# Patient Record
Sex: FEMALE | Race: WHITE | NOT HISPANIC OR LATINO
[De-identification: names, ages, dates, MRNs, and addresses within clinical notes are randomized per-mention and may not be internally consistent; named-entity substitution may affect disease eponyms.]

---

## 2017-12-12 ENCOUNTER — APPOINTMENT (OUTPATIENT)
Dept: OBGYN | Facility: CLINIC | Age: 42
End: 2017-12-12
Payer: MEDICAID

## 2017-12-12 VITALS
WEIGHT: 220 LBS | SYSTOLIC BLOOD PRESSURE: 120 MMHG | HEIGHT: 68 IN | DIASTOLIC BLOOD PRESSURE: 80 MMHG | BODY MASS INDEX: 33.34 KG/M2

## 2017-12-12 DIAGNOSIS — Z01.419 ENCOUNTER FOR GYNECOLOGICAL EXAMINATION (GENERAL) (ROUTINE) W/OUT ABNORMAL FINDINGS: ICD-10-CM

## 2017-12-12 LAB
HCG UR QL: NEGATIVE
QUALITY CONTROL: YES

## 2017-12-12 PROCEDURE — 99396 PREV VISIT EST AGE 40-64: CPT

## 2017-12-12 PROCEDURE — 81025 URINE PREGNANCY TEST: CPT

## 2017-12-12 PROCEDURE — 99213 OFFICE O/P EST LOW 20 MIN: CPT | Mod: 25

## 2017-12-13 LAB — HPV HIGH+LOW RISK DNA PNL CVX: NOT DETECTED

## 2017-12-18 LAB — CYTOLOGY CVX/VAG DOC THIN PREP: NORMAL

## 2017-12-21 ENCOUNTER — APPOINTMENT (OUTPATIENT)
Dept: OBGYN | Facility: CLINIC | Age: 42
End: 2017-12-21
Payer: MEDICAID

## 2017-12-21 ENCOUNTER — ASOB RESULT (OUTPATIENT)
Age: 42
End: 2017-12-21

## 2017-12-21 PROCEDURE — 76830 TRANSVAGINAL US NON-OB: CPT

## 2017-12-21 PROCEDURE — 76857 US EXAM PELVIC LIMITED: CPT

## 2018-10-01 ENCOUNTER — APPOINTMENT (OUTPATIENT)
Dept: OBGYN | Facility: CLINIC | Age: 43
End: 2018-10-01
Payer: MEDICAID

## 2018-10-01 VITALS
WEIGHT: 251.25 LBS | HEART RATE: 103 BPM | HEIGHT: 68 IN | SYSTOLIC BLOOD PRESSURE: 151 MMHG | DIASTOLIC BLOOD PRESSURE: 101 MMHG | BODY MASS INDEX: 38.08 KG/M2

## 2018-10-01 PROCEDURE — 99214 OFFICE O/P EST MOD 30 MIN: CPT

## 2018-10-02 LAB
C TRACH RRNA SPEC QL NAA+PROBE: NOT DETECTED
HPV HIGH+LOW RISK DNA PNL CVX: NOT DETECTED
N GONORRHOEA RRNA SPEC QL NAA+PROBE: NOT DETECTED
SOURCE TP AMPLIFICATION: NORMAL

## 2018-10-05 LAB — CYTOLOGY CVX/VAG DOC THIN PREP: NORMAL

## 2018-11-12 ENCOUNTER — ASOB RESULT (OUTPATIENT)
Age: 43
End: 2018-11-12

## 2018-11-12 ENCOUNTER — APPOINTMENT (OUTPATIENT)
Dept: OBGYN | Facility: CLINIC | Age: 43
End: 2018-11-12
Payer: COMMERCIAL

## 2018-11-12 PROCEDURE — 76830 TRANSVAGINAL US NON-OB: CPT

## 2018-11-12 PROCEDURE — 76857 US EXAM PELVIC LIMITED: CPT

## 2018-11-15 ENCOUNTER — APPOINTMENT (OUTPATIENT)
Dept: OBGYN | Facility: CLINIC | Age: 43
End: 2018-11-15
Payer: COMMERCIAL

## 2018-11-15 VITALS
SYSTOLIC BLOOD PRESSURE: 130 MMHG | WEIGHT: 248 LBS | DIASTOLIC BLOOD PRESSURE: 88 MMHG | HEIGHT: 70 IN | BODY MASS INDEX: 35.5 KG/M2 | HEART RATE: 104 BPM

## 2018-11-15 DIAGNOSIS — D25.2 SUBMUCOUS LEIOMYOMA OF UTERUS: ICD-10-CM

## 2018-11-15 DIAGNOSIS — D25.0 SUBMUCOUS LEIOMYOMA OF UTERUS: ICD-10-CM

## 2018-11-15 PROCEDURE — 99213 OFFICE O/P EST LOW 20 MIN: CPT

## 2020-07-24 ENCOUNTER — APPOINTMENT (OUTPATIENT)
Dept: OBGYN | Facility: CLINIC | Age: 45
End: 2020-07-24
Payer: MEDICAID

## 2020-07-24 VITALS
SYSTOLIC BLOOD PRESSURE: 130 MMHG | BODY MASS INDEX: 37.06 KG/M2 | WEIGHT: 258.25 LBS | DIASTOLIC BLOOD PRESSURE: 89 MMHG

## 2020-07-24 PROCEDURE — 99214 OFFICE O/P EST MOD 30 MIN: CPT

## 2020-07-24 NOTE — PHYSICAL EXAM
[Awake] : awake [Acute Distress] : no acute distress [Alert] : alert [Mass] : no breast mass [Nipple Discharge] : no nipple discharge [Soft] : soft [Axillary LAD] : no axillary lymphadenopathy [Tender] : non tender [Oriented x3] : oriented to person, place, and time [Normal] : uterus [Enlarged ___ wks] : enlarged [unfilled] ~Uweeks [No Bleeding] : there was no active vaginal bleeding [IUD String] : had an IUD string protruding out [Uterine Adnexae] : were not tender and not enlarged

## 2020-07-28 LAB — CYTOLOGY CVX/VAG DOC THIN PREP: ABNORMAL

## 2021-04-15 ENCOUNTER — APPOINTMENT (OUTPATIENT)
Dept: OBGYN | Facility: CLINIC | Age: 46
End: 2021-04-15
Payer: MEDICAID

## 2021-04-15 VITALS
DIASTOLIC BLOOD PRESSURE: 93 MMHG | SYSTOLIC BLOOD PRESSURE: 155 MMHG | BODY MASS INDEX: 37.23 KG/M2 | WEIGHT: 259.44 LBS

## 2021-04-15 PROCEDURE — 99072 ADDL SUPL MATRL&STAF TM PHE: CPT

## 2021-04-15 PROCEDURE — 99213 OFFICE O/P EST LOW 20 MIN: CPT

## 2021-05-04 ENCOUNTER — ASOB RESULT (OUTPATIENT)
Age: 46
End: 2021-05-04

## 2021-05-04 ENCOUNTER — APPOINTMENT (OUTPATIENT)
Dept: ANTEPARTUM | Facility: CLINIC | Age: 46
End: 2021-05-04
Payer: MEDICAID

## 2021-05-04 PROCEDURE — 99072 ADDL SUPL MATRL&STAF TM PHE: CPT

## 2021-05-04 PROCEDURE — 76830 TRANSVAGINAL US NON-OB: CPT

## 2021-05-04 PROCEDURE — 76856 US EXAM PELVIC COMPLETE: CPT | Mod: 59

## 2021-05-12 ENCOUNTER — APPOINTMENT (OUTPATIENT)
Dept: OBGYN | Facility: CLINIC | Age: 46
End: 2021-05-12

## 2021-06-01 ENCOUNTER — APPOINTMENT (OUTPATIENT)
Dept: OBGYN | Facility: CLINIC | Age: 46
End: 2021-06-01

## 2021-06-15 ENCOUNTER — APPOINTMENT (OUTPATIENT)
Dept: OBGYN | Facility: CLINIC | Age: 46
End: 2021-06-15
Payer: MEDICAID

## 2021-06-15 VITALS
DIASTOLIC BLOOD PRESSURE: 91 MMHG | SYSTOLIC BLOOD PRESSURE: 146 MMHG | BODY MASS INDEX: 36.11 KG/M2 | HEIGHT: 70 IN | WEIGHT: 252.25 LBS

## 2021-06-15 PROCEDURE — 99213 OFFICE O/P EST LOW 20 MIN: CPT

## 2021-11-23 ENCOUNTER — APPOINTMENT (OUTPATIENT)
Dept: NEUROLOGY | Facility: CLINIC | Age: 46
End: 2021-11-23
Payer: MEDICAID

## 2021-11-23 VITALS
BODY MASS INDEX: 35.65 KG/M2 | HEIGHT: 70 IN | SYSTOLIC BLOOD PRESSURE: 120 MMHG | WEIGHT: 249 LBS | DIASTOLIC BLOOD PRESSURE: 70 MMHG

## 2021-11-23 DIAGNOSIS — Z86.39 PERSONAL HISTORY OF OTHER ENDOCRINE, NUTRITIONAL AND METABOLIC DISEASE: ICD-10-CM

## 2021-11-23 DIAGNOSIS — Z87.39 PERSONAL HISTORY OF OTHER DISEASES OF THE MUSCULOSKELETAL SYSTEM AND CONNECTIVE TISSUE: ICD-10-CM

## 2021-11-23 PROCEDURE — 99204 OFFICE O/P NEW MOD 45 MIN: CPT

## 2021-11-23 NOTE — HISTORY OF PRESENT ILLNESS
[FreeTextEntry1] : I saw this patient in the office today.\par \par The patient describes headaches.\par This has been going on since the age of 11\par It waxes and wanes in intensity.\par She had been on amitriptyline for prophylaxis in the past. \par She reports that her headaches had subsided to about once per week.\par She then discontinued the amitriptyline.\par The headaches continue to subside and are now occurring only once every few months.\par They last a few days at a time.\par When severe it is associated with nausea and photophobia.

## 2021-11-23 NOTE — ASSESSMENT
[FreeTextEntry1] : This is a 46-year-old woman with what sounds like episodic migraine.\par She has never had imaging.\par I will obtain an MRI of the brain.\par \par I have explained that there are essentially 2 strategies for dealing with chronic headaches.  The first is abortive medication of which there are numerous over-the-counter preparations as well as prescription options.  The second strategy is prophylactic medications.  I have explained that these are medications which prevent headaches when taken on a regular basis.  I have explained that there are several medications which have been found to be preventative against headaches.  I have further explained that none of the medications have an immediate effect.  They must build up in the system over a few weeks.  Most people notice that within a few weeks on the medication, the headache intensity is diminishing.  Within a few more weeks most people begin to notice that the frequency is decreasing as well.  I have explained that the preventive medications were all originally used for other conditions but were also found to work against chronic headaches.  The patient seemed to understand my explanation.\par \par At present she does not seem to require daily preventive medication.\par \par I have suggested she resume Imitrex 100 mg tablets to be taken as needed at migraine onset.\par \par I will see her back in a few months.\par

## 2021-11-23 NOTE — CONSULT LETTER
[Dear  ___] : Dear  [unfilled], [Courtesy Letter:] : I had the pleasure of seeing your patient, [unfilled], in my office today. [Please see my note below.] : Please see my note below. [Consult Closing:] : Thank you very much for allowing me to participate in the care of this patient.  If you have any questions, please do not hesitate to contact me. [Sincerely,] : Sincerely, [FreeTextEntry3] : Harris Adames MD.

## 2021-12-13 ENCOUNTER — APPOINTMENT (OUTPATIENT)
Dept: MRI IMAGING | Facility: CLINIC | Age: 46
End: 2021-12-13
Payer: MEDICAID

## 2021-12-13 ENCOUNTER — OUTPATIENT (OUTPATIENT)
Dept: OUTPATIENT SERVICES | Facility: HOSPITAL | Age: 46
LOS: 1 days | End: 2021-12-13
Payer: MEDICAID

## 2021-12-13 DIAGNOSIS — G44.89 OTHER HEADACHE SYNDROME: ICD-10-CM

## 2021-12-13 PROCEDURE — 70551 MRI BRAIN STEM W/O DYE: CPT

## 2021-12-13 PROCEDURE — 70551 MRI BRAIN STEM W/O DYE: CPT | Mod: 26

## 2021-12-14 ENCOUNTER — NON-APPOINTMENT (OUTPATIENT)
Age: 46
End: 2021-12-14

## 2021-12-15 ENCOUNTER — APPOINTMENT (OUTPATIENT)
Dept: NEUROLOGY | Facility: CLINIC | Age: 46
End: 2021-12-15
Payer: MEDICAID

## 2021-12-15 VITALS
SYSTOLIC BLOOD PRESSURE: 120 MMHG | HEIGHT: 71 IN | BODY MASS INDEX: 34.86 KG/M2 | DIASTOLIC BLOOD PRESSURE: 78 MMHG | WEIGHT: 249 LBS

## 2021-12-15 PROCEDURE — 99214 OFFICE O/P EST MOD 30 MIN: CPT

## 2021-12-15 NOTE — PHYSICAL EXAM
[General Appearance - Alert] : alert [General Appearance - In No Acute Distress] : in no acute distress [Oriented To Time, Place, And Person] : oriented to person, place, and time [Affect] : the affect was normal [Memory Recent] : recent memory was not impaired [Memory Remote] : remote memory was not impaired [Cranial Nerves Optic (II)] : visual acuity intact bilaterally,  visual fields full to confrontation, pupils equal round and reactive to light [Cranial Nerves Oculomotor (III)] : extraocular motion intact [Cranial Nerves Trigeminal (V)] : facial sensation intact symmetrically [Cranial Nerves Facial (VII)] : face symmetrical [Cranial Nerves Vestibulocochlear (VIII)] : hearing was intact bilaterally [Cranial Nerves Glossopharyngeal (IX)] : tongue and palate midline [Cranial Nerves Accessory (XI - Cranial And Spinal)] : head turning and shoulder shrug symmetric [Cranial Nerves Hypoglossal (XII)] : there was no tongue deviation with protrusion [Motor Tone] : muscle tone was normal in all four extremities [Sensation Tactile Decrease] : light touch was intact [Motor Strength] : muscle strength was normal in all four extremities [Sensation Pain / Temperature Decrease] : pain and temperature was intact [Sensation Vibration Decrease] : vibration was intact [Abnormal Walk] : normal gait [2+] : Patella left 2+ [Optic Disc Abnormality] : the optic disc were normal in size and color [Dysarthria] : no dysarthria [Aphasia] : no dysphasia/aphasia [Romberg's Sign] : Romberg's sign was negtive [Coordination - Dysmetria Impaired Finger-to-Nose Bilateral] : not present [Plantar Reflex Right Only] : normal on the right [Plantar Reflex Left Only] : normal on the left

## 2021-12-15 NOTE — HISTORY OF PRESENT ILLNESS
[FreeTextEntry1] : I saw this patient in the office today.\par She presented with her daughter today.\par \par As you recall, the patient described headaches.\par This has been going on since the age of 11\par It waxes and wanes in intensity.\par \par She had been on amitriptyline for prophylaxis in the past. \par She reported that her headaches had subsided to about once per week.\par She then discontinued the amitriptyline.\par The headaches continue to subside and are now occurring only once every few months.\par They last a few days at a time.\par When severe it is associated with nausea and photophobia.

## 2021-12-15 NOTE — ASSESSMENT
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 (versus normal anatomic variant) in the left inferior frontal region.\par \par At present she does not seem to require daily preventive medication.\par \par I had suggested she resume Imitrex 100 mg tablets to be taken as needed at migraine onset.\par \par I will see her back in 3 months.\par \par I will repeat the MRI in 6 months to assess for stability.\par

## 2021-12-15 NOTE — DATA REVIEWED
[de-identified] : Brain MRI was performed on 12/13/41.\par DD images.\par There is a partially empty sella.\par The left inferior frontal gyrus has a somewhat atypical appearance and a small encephalocele cannot fully be excluded.\par

## 2022-02-10 ENCOUNTER — APPOINTMENT (OUTPATIENT)
Dept: NEUROLOGY | Facility: CLINIC | Age: 47
End: 2022-02-10

## 2022-02-23 ENCOUNTER — RX RENEWAL (OUTPATIENT)
Age: 47
End: 2022-02-23

## 2022-03-24 ENCOUNTER — APPOINTMENT (OUTPATIENT)
Dept: NEUROLOGY | Facility: CLINIC | Age: 47
End: 2022-03-24
Payer: MEDICAID

## 2022-03-24 VITALS
BODY MASS INDEX: 34.02 KG/M2 | DIASTOLIC BLOOD PRESSURE: 80 MMHG | HEIGHT: 71 IN | SYSTOLIC BLOOD PRESSURE: 114 MMHG | WEIGHT: 243 LBS

## 2022-03-24 PROCEDURE — 99213 OFFICE O/P EST LOW 20 MIN: CPT

## 2022-03-24 NOTE — HISTORY OF PRESENT ILLNESS
Yes [FreeTextEntry1] : I saw this patient in the office today.\par She presented with her daughter today.\par \par As you recall, the patient described headaches.\par This has been going on since the age of 11\par It waxes and wanes in intensity.\par \par She had been on amitriptyline for prophylaxis in the past. \par She reported that her headaches had subsided to about once per week.\par She then discontinued the amitriptyline.\par The headaches continue to subside and are now occurring only once every few months.\par They last a few days at a time.\par When severe it is associated with nausea and photophobia.

## 2022-03-24 NOTE — PHYSICAL EXAM
[General Appearance - Alert] : alert [General Appearance - In No Acute Distress] : in no acute distress [Oriented To Time, Place, And Person] : oriented to person, place, and time [Affect] : the affect was normal [Memory Recent] : recent memory was not impaired [Memory Remote] : remote memory was not impaired [Cranial Nerves Optic (II)] : visual acuity intact bilaterally,  visual fields full to confrontation, pupils equal round and reactive to light [Cranial Nerves Oculomotor (III)] : extraocular motion intact [Cranial Nerves Trigeminal (V)] : facial sensation intact symmetrically [Cranial Nerves Facial (VII)] : face symmetrical [Cranial Nerves Vestibulocochlear (VIII)] : hearing was intact bilaterally [Cranial Nerves Glossopharyngeal (IX)] : tongue and palate midline [Cranial Nerves Accessory (XI - Cranial And Spinal)] : head turning and shoulder shrug symmetric [Cranial Nerves Hypoglossal (XII)] : there was no tongue deviation with protrusion [Motor Tone] : muscle tone was normal in all four extremities [Motor Strength] : muscle strength was normal in all four extremities [Sensation Tactile Decrease] : light touch was intact [Sensation Pain / Temperature Decrease] : pain and temperature was intact [Sensation Vibration Decrease] : vibration was intact [Abnormal Walk] : normal gait [2+] : Patella left 2+ [Optic Disc Abnormality] : the optic disc were normal in size and color [Dysarthria] : no dysarthria [Aphasia] : no dysphasia/aphasia [Romberg's Sign] : Romberg's sign was negtive [Coordination - Dysmetria Impaired Finger-to-Nose Bilateral] : not present [Plantar Reflex Right Only] : normal on the right [Plantar Reflex Left Only] : normal on the left

## 2022-03-24 NOTE — DATA REVIEWED
[de-identified] : Brain MRI was performed on 12/13/41.\par DD images.\par There is a partially empty sella.\par The left inferior frontal gyrus has a somewhat atypical appearance and a small encephalocele cannot fully be excluded.\par

## 2022-04-04 ENCOUNTER — APPOINTMENT (OUTPATIENT)
Dept: OBGYN | Facility: CLINIC | Age: 47
End: 2022-04-04
Payer: MEDICAID

## 2022-04-04 VITALS
DIASTOLIC BLOOD PRESSURE: 76 MMHG | WEIGHT: 246 LBS | BODY MASS INDEX: 34.44 KG/M2 | SYSTOLIC BLOOD PRESSURE: 122 MMHG | HEIGHT: 71 IN

## 2022-04-04 DIAGNOSIS — Z12.39 ENCOUNTER FOR OTHER SCREENING FOR MALIGNANT NEOPLASM OF BREAST: ICD-10-CM

## 2022-04-04 PROCEDURE — 99214 OFFICE O/P EST MOD 30 MIN: CPT

## 2022-04-04 NOTE — PHYSICAL EXAM
[Chaperone Present] : A chaperone was present in the examining room during all aspects of the physical examination [FreeTextEntry1] : Celia [Normal] : uterus [No Bleeding] : there was no active vaginal bleeding [IUD String] : had an IUD string protruding out [Tenderness] : tender [Enlarged ___ wks] : enlarged [unfilled] ~Uweeks [Uterine Adnexae] : were not tender and not enlarged

## 2022-04-26 ENCOUNTER — APPOINTMENT (OUTPATIENT)
Dept: OBGYN | Facility: CLINIC | Age: 47
End: 2022-04-26
Payer: MEDICAID

## 2022-04-26 ENCOUNTER — RESULT CHARGE (OUTPATIENT)
Age: 47
End: 2022-04-26

## 2022-04-26 VITALS — HEIGHT: 71 IN | DIASTOLIC BLOOD PRESSURE: 80 MMHG | SYSTOLIC BLOOD PRESSURE: 122 MMHG

## 2022-04-26 DIAGNOSIS — Z30.432 ENCOUNTER FOR REMOVAL OF INTRAUTERINE CONTRACEPTIVE DEVICE: ICD-10-CM

## 2022-04-26 LAB
HCG UR QL: NEGATIVE
QUALITY CONTROL: YES

## 2022-04-26 PROCEDURE — 58558Z: CUSTOM

## 2022-04-26 NOTE — PROCEDURE
[Hysteroscopy] : Hysteroscopy [Time out performed] : Pre-procedure time out performed.  Patient's name, date of birth and procedure confirmed. 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) [Risks] : risks [Benefits] : benefits [Alternatives] : alternatives [Patient] : patient [Infection] : infection [Bleeding] : bleeding [Allergic Reaction] : allergic reaction [Lidocaine___ mL] : [unfilled] ~UmL of lidocaine [flexible] : Using aseptic technique a hysteroscopy was performed using a flexible hysteroscope [Sent to Pathology] : specimen was placed in buffered formalin and sent for pathology [Antibiotics given] : antibiotics not given [Hemostasis obtained] : hemostasis obtained [Tolerated Well] : Patient tolerated the procedure well [Aftercare instructions/regstrictions given and follow-up scheduled] : Aftercare instructions/restrictions given and follow-up scheduled [de-identified] : Sterile condition and with paracervical block the cervix was dilated and IUD removed and sent to pathology.  Endometrial sampling obtained and sent to pathology.  Hysteroscopic evaluation shows irregular endometrial cavity with lush features.  No polyps or myomas noted.  Patient tolerated the procedure well.

## 2022-05-03 LAB — CORE LAB BIOPSY: NORMAL

## 2022-06-20 ENCOUNTER — INPATIENT (INPATIENT)
Facility: HOSPITAL | Age: 47
LOS: 7 days | Discharge: HOME | DRG: 300 | End: 2022-06-28
Attending: NEUROLOGICAL SURGERY | Admitting: NEUROLOGICAL SURGERY
Payer: MEDICAID

## 2022-06-20 ENCOUNTER — APPOINTMENT (OUTPATIENT)
Dept: NEUROLOGY | Facility: CLINIC | Age: 47
End: 2022-06-20
Payer: MEDICAID

## 2022-06-20 VITALS
WEIGHT: 233.47 LBS | DIASTOLIC BLOOD PRESSURE: 67 MMHG | SYSTOLIC BLOOD PRESSURE: 130 MMHG | RESPIRATION RATE: 20 BRPM | OXYGEN SATURATION: 98 % | HEART RATE: 99 BPM | TEMPERATURE: 99 F

## 2022-06-20 VITALS
BODY MASS INDEX: 34.44 KG/M2 | WEIGHT: 246 LBS | DIASTOLIC BLOOD PRESSURE: 90 MMHG | SYSTOLIC BLOOD PRESSURE: 130 MMHG | HEIGHT: 71 IN

## 2022-06-20 DIAGNOSIS — G96.00 CEREBROSPINAL FLUID LEAK, UNSPECIFIED: ICD-10-CM

## 2022-06-20 LAB
ALBUMIN SERPL ELPH-MCNC: 4.2 G/DL — SIGNIFICANT CHANGE UP (ref 3.3–5.2)
ALP SERPL-CCNC: 80 U/L — SIGNIFICANT CHANGE UP (ref 40–120)
ALT FLD-CCNC: 9 U/L — SIGNIFICANT CHANGE UP
ANION GAP SERPL CALC-SCNC: 14 MMOL/L — SIGNIFICANT CHANGE UP (ref 5–17)
APTT BLD: 33.4 SEC — SIGNIFICANT CHANGE UP (ref 27.5–35.5)
AST SERPL-CCNC: 10 U/L — SIGNIFICANT CHANGE UP
BASOPHILS # BLD AUTO: 0.03 K/UL — SIGNIFICANT CHANGE UP (ref 0–0.2)
BASOPHILS NFR BLD AUTO: 0.2 % — SIGNIFICANT CHANGE UP (ref 0–2)
BILIRUB SERPL-MCNC: 0.5 MG/DL — SIGNIFICANT CHANGE UP (ref 0.4–2)
BLD GP AB SCN SERPL QL: SIGNIFICANT CHANGE UP
BUN SERPL-MCNC: 4.3 MG/DL — LOW (ref 8–20)
CALCIUM SERPL-MCNC: 9.8 MG/DL — SIGNIFICANT CHANGE UP (ref 8.6–10.2)
CHLORIDE SERPL-SCNC: 100 MMOL/L — SIGNIFICANT CHANGE UP (ref 98–107)
CO2 SERPL-SCNC: 24 MMOL/L — SIGNIFICANT CHANGE UP (ref 22–29)
CREAT SERPL-MCNC: 0.65 MG/DL — SIGNIFICANT CHANGE UP (ref 0.5–1.3)
EGFR: 109 ML/MIN/1.73M2 — SIGNIFICANT CHANGE UP
EOSINOPHIL # BLD AUTO: 0.05 K/UL — SIGNIFICANT CHANGE UP (ref 0–0.5)
EOSINOPHIL NFR BLD AUTO: 0.3 % — SIGNIFICANT CHANGE UP (ref 0–6)
GLUCOSE SERPL-MCNC: 93 MG/DL — SIGNIFICANT CHANGE UP (ref 70–99)
HCG SERPL-ACNC: <4 MIU/ML — SIGNIFICANT CHANGE UP
HCT VFR BLD CALC: 43.9 % — SIGNIFICANT CHANGE UP (ref 34.5–45)
HGB BLD-MCNC: 13.9 G/DL — SIGNIFICANT CHANGE UP (ref 11.5–15.5)
IMM GRANULOCYTES NFR BLD AUTO: 0.4 % — SIGNIFICANT CHANGE UP (ref 0–1.5)
INR BLD: 1.23 RATIO — HIGH (ref 0.88–1.16)
LACTATE BLDV-MCNC: 1.2 MMOL/L — SIGNIFICANT CHANGE UP (ref 0.5–2)
LYMPHOCYTES # BLD AUTO: 1.69 K/UL — SIGNIFICANT CHANGE UP (ref 1–3.3)
LYMPHOCYTES # BLD AUTO: 10.6 % — LOW (ref 13–44)
MCHC RBC-ENTMCNC: 27.3 PG — SIGNIFICANT CHANGE UP (ref 27–34)
MCHC RBC-ENTMCNC: 31.7 GM/DL — LOW (ref 32–36)
MCV RBC AUTO: 86.2 FL — SIGNIFICANT CHANGE UP (ref 80–100)
MONOCYTES # BLD AUTO: 0.65 K/UL — SIGNIFICANT CHANGE UP (ref 0–0.9)
MONOCYTES NFR BLD AUTO: 4.1 % — SIGNIFICANT CHANGE UP (ref 2–14)
NEUTROPHILS # BLD AUTO: 13.49 K/UL — HIGH (ref 1.8–7.4)
NEUTROPHILS NFR BLD AUTO: 84.4 % — HIGH (ref 43–77)
PLATELET # BLD AUTO: 526 K/UL — HIGH (ref 150–400)
POTASSIUM SERPL-MCNC: 4.2 MMOL/L — SIGNIFICANT CHANGE UP (ref 3.5–5.3)
POTASSIUM SERPL-SCNC: 4.2 MMOL/L — SIGNIFICANT CHANGE UP (ref 3.5–5.3)
PROT SERPL-MCNC: 8.4 G/DL — SIGNIFICANT CHANGE UP (ref 6.6–8.7)
PROTHROM AB SERPL-ACNC: 14.3 SEC — HIGH (ref 10.5–13.4)
RBC # BLD: 5.09 M/UL — SIGNIFICANT CHANGE UP (ref 3.8–5.2)
RBC # FLD: 13.2 % — SIGNIFICANT CHANGE UP (ref 10.3–14.5)
SARS-COV-2 RNA SPEC QL NAA+PROBE: SIGNIFICANT CHANGE UP
SODIUM SERPL-SCNC: 138 MMOL/L — SIGNIFICANT CHANGE UP (ref 135–145)
WBC # BLD: 15.97 K/UL — HIGH (ref 3.8–10.5)
WBC # FLD AUTO: 15.97 K/UL — HIGH (ref 3.8–10.5)

## 2022-06-20 PROCEDURE — 99222 1ST HOSP IP/OBS MODERATE 55: CPT

## 2022-06-20 PROCEDURE — 70450 CT HEAD/BRAIN W/O DYE: CPT | Mod: 26

## 2022-06-20 PROCEDURE — 93010 ELECTROCARDIOGRAM REPORT: CPT

## 2022-06-20 PROCEDURE — 71045 X-RAY EXAM CHEST 1 VIEW: CPT | Mod: 26

## 2022-06-20 PROCEDURE — 99215 OFFICE O/P EST HI 40 MIN: CPT

## 2022-06-20 PROCEDURE — 99285 EMERGENCY DEPT VISIT HI MDM: CPT

## 2022-06-20 RX ORDER — HYDROCODONE BITARTRATE AND ACETAMINOPHEN 5; 325 MG/1; MG/1
5-325 TABLET ORAL
Qty: 60 | Refills: 0 | Status: ACTIVE | COMMUNITY
Start: 2022-05-23

## 2022-06-20 RX ORDER — VANCOMYCIN HCL 1 G
1000 VIAL (EA) INTRAVENOUS ONCE
Refills: 0 | Status: COMPLETED | OUTPATIENT
Start: 2022-06-20 | End: 2022-06-20

## 2022-06-20 RX ORDER — ACETAMINOPHEN 500 MG
650 TABLET ORAL EVERY 6 HOURS
Refills: 0 | Status: DISCONTINUED | OUTPATIENT
Start: 2022-06-20 | End: 2022-06-28

## 2022-06-20 RX ORDER — ACETAMINOPHEN 500 MG
1000 TABLET ORAL ONCE
Refills: 0 | Status: COMPLETED | OUTPATIENT
Start: 2022-06-20 | End: 2022-06-20

## 2022-06-20 RX ORDER — CEFTRIAXONE 500 MG/1
2000 INJECTION, POWDER, FOR SOLUTION INTRAMUSCULAR; INTRAVENOUS ONCE
Refills: 0 | Status: COMPLETED | OUTPATIENT
Start: 2022-06-20 | End: 2022-06-20

## 2022-06-20 RX ORDER — SODIUM CHLORIDE 9 MG/ML
1000 INJECTION INTRAMUSCULAR; INTRAVENOUS; SUBCUTANEOUS ONCE
Refills: 0 | Status: COMPLETED | OUTPATIENT
Start: 2022-06-20 | End: 2022-06-20

## 2022-06-20 RX ADMIN — Medication 250 MILLIGRAM(S): at 15:36

## 2022-06-20 RX ADMIN — Medication 1000 MILLIGRAM(S): at 14:55

## 2022-06-20 RX ADMIN — CEFTRIAXONE 100 MILLIGRAM(S): 500 INJECTION, POWDER, FOR SOLUTION INTRAMUSCULAR; INTRAVENOUS at 13:17

## 2022-06-20 RX ADMIN — CEFTRIAXONE 2000 MILLIGRAM(S): 500 INJECTION, POWDER, FOR SOLUTION INTRAMUSCULAR; INTRAVENOUS at 15:30

## 2022-06-20 RX ADMIN — SODIUM CHLORIDE 1000 MILLILITER(S): 9 INJECTION INTRAMUSCULAR; INTRAVENOUS; SUBCUTANEOUS at 13:17

## 2022-06-20 RX ADMIN — Medication 400 MILLIGRAM(S): at 13:17

## 2022-06-20 NOTE — ED PROVIDER NOTE - TOBACCO USE
Final Anesthesia Post-op Assessment    Patient: Keegan Elizondo  Procedure(s) Performed: LAPAROSCOPIC LOW ANTERIOR COLON RESECTIONLAPAROSCOPIC CHOLECYSTECTOMYOMENTECTOMY  Anesthesia type: General    Vital Last Value   Temperature 37.4 °C (99.3 °F) (01/11/18 1432)   Pulse 93 (01/11/18 1445)   Respiratory Rate 16 (01/11/18 1500)   Non-Invasive   Blood Pressure 164/78 (01/11/18 1500)   Arterial  Blood Pressure 191/66 (01/11/18 1445)   Pulse Oximetry 100 % (01/11/18 1445)     Last 24 I/O:   Intake/Output Summary (Last 24 hours) at 01/11/18 1514  Last data filed at 01/11/18 1430   Gross per 24 hour   Intake             3800 ml   Output             1525 ml   Net             2275 ml       PATIENT LOCATION: PACU Phase 1  POST-OP VITAL SIGNS: stable  LEVEL OF CONSCIOUSNESS: participates in exam, awake, alert, answers questions appropriately and oriented  RESPIRATORY STATUS: spontaneous ventilation, unassisted and room air  CARDIOVASCULAR: blood pressure returned to baseline, stable and hypertensive  HYDRATION: euvolemic    PAIN MANAGEMENT: adequately controlled  NAUSEA: None  AIRWAY PATENCY: patent  POST-OP ASSESSMENT: no complications, patient tolerated procedure well with no complications, no evidence of recall and sufficiently recovered from acute administration of anesthesia effects and able to participate in evaluation  COMPLICATIONS: none  HANDOFF:  Handoff to receiving nurse was performed and questions were answered       Never smoker

## 2022-06-20 NOTE — ED PROVIDER NOTE - OBJECTIVE STATEMENT
46y F w/ hx HLD, chronic back pain, presenting for headache. Pt reports that she had a COVID nasal swab done in her left nare prior to traveling to [place] on 5/28. Thereafter developed persistent clear rhinorrhea, associated with fever, headache, cough and generalized weakness. Denies neck pain. 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 Pt has since completed 1 week course of augmentin. [place] the [place] 4 [place] ago and followed up with neurology. Was sent in today by Dr. Adames for further evaluation. Pt states she continues to spike fevers up to 102; last took tylenol last night.

## 2022-06-20 NOTE — DATA REVIEWED
[de-identified] : Brain MRI was performed on 12/13/21.\par There is a partially empty sella.\par The left inferior frontal gyrus has a somewhat atypical appearance and a small encephalocele cannot fully be excluded.\par \par Brain MRI was repeated on 6/9/2022 in her home country of\par The study suggested the possibility of a CSF leak with dural fistula.

## 2022-06-20 NOTE — H&P ADULT - NSHPREVIEWOFSYSTEMS_GEN_ALL_CORE
Review of systems:  pos cough, due to gagging on the salty fluid in the back of her throat.   Pos temps pos fevers 102 last night  Eyes no visual  Cardiac neg   Pulmo: neg  GI: neg  : neg  Skin: neg  Neuro: headache frontal, generalized weakness from lack of sleep.

## 2022-06-20 NOTE — ASSESSMENT
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 (versus normal anatomic variant) in the left inferior frontal region.\par \par At present she does not seem to require daily preventive medication.\par I had suggested she resume Imitrex 100 mg tablets to be taken as needed at migraine onset.\par \par She underwent repeat brain MRI which suggested CSF Leak.\par She is actively leaking clear fluid likely CSF.\par \par I have advised her to go directly to the emergency department.\par She will need neurosurgical consultation.\par Due to the mechanism of injury this may be amenable to transnasal surgery.\par I will leave the decisions for this to the neurosurgeons.\par \par I have spoken with the emergency department at [facility], and have also spoken with the neurosurgical PA on call.\par \par We will schedule follow-up upon her discharge.\par

## 2022-06-20 NOTE — HISTORY OF PRESENT ILLNESS
[FreeTextEntry1] : I saw this patient in the office today.\par She presented with her daughter today.\par \par As you recall, the patient described headaches.\par This has been going on since the age of 11\par It waxes and wanes in intensity.\par \par She had been on amitriptyline for prophylaxis in the past. \par She reported that her headaches had subsided to about once per week.\par She then discontinued the amitriptyline.\par The headaches continue to subside and are now occurring only once every few months.\par They last a few days at a time.\par When severe it is associated with nausea and photophobia. \par \par 6/20/2022 visit:\par The patient was visiting [place] (her home country) and had a nasal swab PCR test for COVID for the trip home.\par This was on 5/28/2022.\par Immediately after the nasal swab she noticed clear nasal discharge.\par The next day she noted fever and headache and sought medical attention.\par She was sent for MRI and CT of the head.\par The studies suggested CSF leak and possible dural fistula.\par She was told that she would need surgery, however, she would not be able to fly home for 6 months after the surgery.\par She elected to fly home immediately and seek attention here.

## 2022-06-20 NOTE — ED ADULT NURSE NOTE - NS ED NURSE LEVEL OF CONSCIOUSNESS AFFECT
-Meloxicam ordered.  Take 15 mg daily.  Please take this medication with food.  DO NOT take any other nonsteroidal anti-inflammatory drugs (NSAIDs) such as Advil, ibuprofen, Aleve, naproxen, etc while on this medication.  -Recommend utilizing the Chopat strap during activity for the patellar tendinitis.  -Recommend wearing the night splint for the Achilles tendinitis.    -Please ice massage the patellar tendon and Achilles tendon for 15 minutes at the end of the day.  -Tylenol as needed for breakthrough pain.  Maximum 3000 mg in 24 hours.  -Home exercises provided.  Please do the exercises 5-6 days per week.  -Avoid activities that aggravate the pain.    -Follow up in one month.  If no better at that point, we will consider formal physical therapy.    
Calm

## 2022-06-20 NOTE — CHART NOTE - NSCHARTNOTEFT_GEN_A_CORE
[facility]                                        Neurology at [place]                                 [facility] [address] [facility]                                  [address]                                        [place], [place], [address]                                             [phone]        This patient was seen in the office today.  She had a nasal swab for COVID-19 and afterwards noted clear nasal discharge.  She was sent for imaging which demonstrated a CSF leak likely related to dural fistula in the ethmoid region.  The studies were performed in her home country of [place].  She brought the discs and reports of the imaging.    I had discussed with the neurosurgery service and referred her to the emergency department for evaluation and further management.

## 2022-06-20 NOTE — H&P ADULT - NSHPLABSRESULTS_GEN_ALL_CORE
13.9   15.97 )-----------( 526      ( 20 Jun 2022 13:25 )             43.9     06-20    138  |  100  |  4.3<L>  ----------------------------<  93  4.2   |  24.0  |  0.65    Ca    9.8      20 Jun 2022 13:25    TPro  8.4  /  Alb  4.2  /  TBili  0.5  /  DBili  x   /  AST  10  /  ALT  9   /  AlkPhos  80  06-20  PT/INR - ( 20 Jun 2022 13:25 )   PT: 14.3 sec;   INR: 1.23 ratio         PTT - ( 20 Jun 2022 13:25 )  PTT:33.4 sec

## 2022-06-20 NOTE — ED PROVIDER NOTE - PHYSICAL EXAMINATION
Constitutional: Awake, alert, in mild distress  Eyes: no scleral icterus  HENT: normocephalic, atraumatic, moist oral mucosa, +cobblestoning of posterior pharynx, no exudate. +Constant clear rhinorrhea.  Neck: supple  CV: RRR, no murmur  Pulm: non-labored respirations, CTAB  Abdomen: soft, non-tender, non-distended  Extremities: no edema, no deformity  Skin: no rash, no jaundice  Neuro: AAOx3, moving all extremities equally

## 2022-06-20 NOTE — ED PROVIDER NOTE - CLINICAL SUMMARY MEDICAL DECISION MAKING FREE TEXT BOX
46y F w/ hx HLD, presenting after being diagnosed with CSF leak on MRI. Neurosurgery consulted. Will treat with empiric antibiotics, check labs, cultures, admit.

## 2022-06-20 NOTE — ED PROVIDER NOTE - NS ED ROS FT
Constitutional: +fever  Eyes: no vision changes  ENT: no nasal congestion, no sore throat, +rhinorrhea  CV: no chest pain  Resp: +cough, no shortness of breath  GI: no abdominal pain, no vomiting, no diarrhea  : no dysuria  MSK: no joint pain  Skin: no rash  Neuro: +headache, no focal weakness, no paresthesias

## 2022-06-20 NOTE — H&P ADULT - NSHPPHYSICALEXAM_GEN_ALL_CORE
PHYSICAL EXAM:  moderate discomfort  gagging on post pharynx fluids  Constitutional:    Eyes: reactive bilat    ENMT: buccal mucosa moist, midline tongue, facial symm    Neck: supple    Breasts: non contributory    Back: non tenderness.     Respiratory:  clear bs    Cardiovascular: reg rate rhythm    Gastrointestinal: Soft, nontender Bs pos    Extremities: moving all extrem upper and lower extrem, neg edema    Neurological: CN II-xII intact, facia neg, pronators -neg    Skin: neg    Lymph Nodes: neg    Musculoskeletal: nontender with palp

## 2022-06-20 NOTE — ED PROVIDER NOTE - ATTENDING CONTRIBUTION TO CARE
seen with resident: pleasant adult female with clear rhinorrhea for several weeks; outpt w/u concerning for csf leak; currently only c/o mild headache; on exam, NAD, +clear rhinorrhea; normal heart and lung sounds; no neuro deficits; seen by neurosurg, will admit to their service.

## 2022-06-20 NOTE — H&P ADULT - HISTORY OF PRESENT ILLNESS
· HPI Objective Statement: 46y F w/ hx HLD, chronic back pain, presenting for headache. Pt reports that she had a COVID nasal swab done in her left nare prior to traveling to [place] on 5/28. Thereafter developed persistent clear rhinorrhea, associated with fever, headache, cough and generalized weakness. Denies neck pain. 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 Pt has since completed 1 week course of augmentin. [place] 4 [place] ago and followed up with neurology. Was sent in today by Dr. Adames for further evaluation. Pt states she continues to spike fevers up to 102; last took tylenol last night.

## 2022-06-21 PROCEDURE — 99232 SBSQ HOSP IP/OBS MODERATE 35: CPT

## 2022-06-21 PROCEDURE — 70450 CT HEAD/BRAIN W/O DYE: CPT | Mod: 26

## 2022-06-21 RX ORDER — SODIUM CHLORIDE 9 MG/ML
1000 INJECTION INTRAMUSCULAR; INTRAVENOUS; SUBCUTANEOUS
Refills: 0 | Status: DISCONTINUED | OUTPATIENT
Start: 2022-06-21 | End: 2022-06-28

## 2022-06-21 RX ADMIN — Medication 650 MILLIGRAM(S): at 08:38

## 2022-06-21 RX ADMIN — Medication 650 MILLIGRAM(S): at 09:38

## 2022-06-21 RX ADMIN — SODIUM CHLORIDE 50 MILLILITER(S): 9 INJECTION INTRAMUSCULAR; INTRAVENOUS; SUBCUTANEOUS at 16:21

## 2022-06-21 NOTE — PROGRESS NOTE ADULT - SUBJECTIVE AND OBJECTIVE BOX
INTERVAL HPI/OVERNIGHT EVENTS:  This is a 46yf presented with a hx of having a Covid PCR and then     MEDICATIONS  (STANDING):  sodium chloride 0.9%. 1000 milliLiter(s) (50 mL/Hr) IV Continuous <Continuous>    MEDICATIONS  (PRN):  acetaminophen     Tablet .. 650 milliGRAM(s) Oral every 6 hours PRN Temp greater or equal to 38C (100.4F), Mild Pain (1 - 3)      Allergies    No Known Allergies    Intolerances          Vital Signs Last 24 Hrs  T(C): 36.8 (21 Jun 2022 11:12), Max: 37 (20 Jun 2022 16:08)  T(F): 98.2 (21 Jun 2022 11:12), Max: 98.6 (20 Jun 2022 16:08)  HR: 85 (21 Jun 2022 11:12) (76 - 93)  BP: 114/74 (21 Jun 2022 11:12) (110/66 - 130/82)  BP(mean): --  RR: 20 (21 Jun 2022 11:12) (20 - 20)  SpO2: 95% (21 Jun 2022 11:12) (92% - 95%)       PHYSICAL EXAM  GENERAL: NAD, well-groomed, well-developed  HEAD:  Atraumatic, Normocephalic  EYES: EOMI, PERRLA, conjunctiva and sclera clear  ENMT: No tonsillar erythema, exudates, or enlargement; Moist mucous membranes, Good dentition, No lesions  NECK: Supple, No JVD, Normal thyroid  NERVOUS SYSTEM:  Alert & Oriented X3, Good concentration; Motor Strength 5/5 B/L upper and lower extremities; DTRs 2+ intact and symmetric  CHEST/LUNG: Clear to percussion bilaterally; No rales, rhonchi, wheezing, or rubs  HEART: Regular rate and rhythm; No murmurs, rubs, or gallops  ABDOMEN: Soft, Nontender, Nondistended; Bowel sounds present  EXTREMITIES:  2+ Peripheral Pulses, No clubbing, cyanosis, or edema  LYMPH: No lymphadenopathy noted  SKIN: No rashes or lesions      LABS:                          13.9   15.97 )-----------( 526      ( 20 Jun 2022 13:25 )             43.9     06-20    138  |  100  |  4.3<L>  ----------------------------<  93  4.2   |  24.0  |  0.65    Ca    9.8      20 Jun 2022 13:25    TPro  8.4  /  Alb  4.2  /  TBili  0.5  /  DBili  x   /  AST  10  /  ALT  9   /  AlkPhos  80  06-20    PT/INR - ( 20 Jun 2022 13:25 )   PT: 14.3 sec;   INR: 1.23 ratio         PTT - ( 20 Jun 2022 13:25 )  PTT:33.4 sec    I&O's Detail    RADIOLOGY & ADDITIONAL TESTS:   INTERVAL HPI/OVERNIGHT EVENTS:  This is a 46yf presented with a hx of having a Covid PCR prior to travel. Pt noted drainage from her left nare which was attributed to allergic rhinitis and the pt continue to drain days later which were then accompanied with headache.  Pt then has temps.  Pt was in [place] and was evaluated and deemed to have a csf leak which was concerning for infection due to temps.  Pt was administered Augmentin.  Pt sought medical attention on 6/20 in the [facility] after being admitted to the hosp and being told that most likely she will need a surgical intervention.   At this time the pt remains afebrile.     MEDICATIONS  (STANDING):  sodium chloride 0.9%. 1000 milliLiter(s) (50 mL/Hr) IV Continuous <Continuous>    MEDICATIONS  (PRN):  acetaminophen     Tablet .. 650 milliGRAM(s) Oral every 6 hours PRN Temp greater or equal to 38C (100.4F), Mild Pain (1 - 3)      Allergies  No Known Allergies  Intolerances      Vital Signs Last 24 Hrs  T(C): 36.8 (21 Jun 2022 11:12), Max: 37 (20 Jun 2022 16:08)  T(F): 98.2 (21 Jun 2022 11:12), Max: 98.6 (20 Jun 2022 16:08)  HR: 85 (21 Jun 2022 11:12) (76 - 93)  BP: 114/74 (21 Jun 2022 11:12) (110/66 - 130/82)  BP(mean): --  RR: 20 (21 Jun 2022 11:12) (20 - 20)  SpO2: 95% (21 Jun 2022 11:12) (92% - 95%)       PHYSICAL EXAM  neg headache.   GENERAL: NAD, well-groomed, well-developed  HEAD:  Atraumatic, Normocephalic  EYES: EOMI, PERRLA, conjunctiva and sclera clear  ENMT: No tonsillar erythema, exudates, or enlargement; Moist mucous membranes, Good dentition, No lesions  NECK: Supple, No JVD, Normal thyroid  NERVOUS SYSTEM:  Alert & Oriented X3, Good concentration; Motor Strength 5/5 B/L upper and lower extremities; DTRs 2+ intact and symmetric  CHEST/LUNG: Clear to percussion bilaterally; No rales, rhonchi, wheezing, or rubs  HEART: Regular rate and rhythm;  EXTREMITIES:  2+ Peripheral Pulses, No  edema  LYMPH: No lymphadenopathy noted  SKIN: No rashes or lesions      LABS:                          13.9   15.97 )-----------( 526      ( 20 Jun 2022 13:25 )             43.9     06-20    138  |  100  |  4.3<L>  ----------------------------<  93  4.2   |  24.0  |  0.65    Ca    9.8      20 Jun 2022 13:25    TPro  8.4  /  Alb  4.2  /  TBili  0.5  /  DBili  x   /  AST  10  /  ALT  9   /  AlkPhos  80  06-20    PT/INR - ( 20 Jun 2022 13:25 )   PT: 14.3 sec;   INR: 1.23 ratio         PTT - ( 20 Jun 2022 13:25 )  PTT:33.4 sec    I&O's Detail    RADIOLOGY & ADDITIONAL TESTS:  < from: CT Head No Cont (06.21.22 @ 10:31) >    ACC: [id number] EXAM:  CT BRAIN                          PROCEDURE DATE:  06/21/2022        < end of copied text >  < from: CT Head No Cont (06.21.22 @ 10:31) >  INTERPRETATION:  Clinical indications: CSF leak.    Thin axial sections were performed from base skull to vertex without   contrast. Coronal and sagittal destructions were performed as well    This exam is compared with prior head CT performed on June 20 2022nd.    The lateral ventricles have a normal configuration    Small area of extra-axial fluid is seen involving the posterior fossa   region bilaterally. This is slightly more prominent on the left side than   the right. This appears stable when compared with the prior exam.    There is no acute hemorrhage mass or mass effect seen.    Evaluation of osseous structures with the appropriate window appears   unremarkable    Air-fluid levels involving the left maxillary sinus is again seen.    Both mastoid and middle ear regions appear clear.    IMPRESSION: Stable exam.    --- End of Report ---    < end of copied text >

## 2022-06-22 LAB
ALBUMIN SERPL ELPH-MCNC: 3.8 G/DL — SIGNIFICANT CHANGE UP (ref 3.3–5.2)
ALP SERPL-CCNC: 70 U/L — SIGNIFICANT CHANGE UP (ref 40–120)
ALT FLD-CCNC: 9 U/L — SIGNIFICANT CHANGE UP
ANION GAP SERPL CALC-SCNC: 12 MMOL/L — SIGNIFICANT CHANGE UP (ref 5–17)
APPEARANCE CSF: CLEAR — SIGNIFICANT CHANGE UP
APPEARANCE SPUN FLD: COLORLESS — SIGNIFICANT CHANGE UP
AST SERPL-CCNC: 9 U/L — SIGNIFICANT CHANGE UP
BASOPHILS # BLD AUTO: 0.02 K/UL — SIGNIFICANT CHANGE UP (ref 0–0.2)
BASOPHILS NFR BLD AUTO: 0.2 % — SIGNIFICANT CHANGE UP (ref 0–2)
BILIRUB SERPL-MCNC: 0.5 MG/DL — SIGNIFICANT CHANGE UP (ref 0.4–2)
BUN SERPL-MCNC: 6.4 MG/DL — LOW (ref 8–20)
CALCIUM SERPL-MCNC: 9.3 MG/DL — SIGNIFICANT CHANGE UP (ref 8.6–10.2)
CHLORIDE SERPL-SCNC: 104 MMOL/L — SIGNIFICANT CHANGE UP (ref 98–107)
CO2 SERPL-SCNC: 25 MMOL/L — SIGNIFICANT CHANGE UP (ref 22–29)
COLOR CSF: SIGNIFICANT CHANGE UP
CREAT SERPL-MCNC: 0.5 MG/DL — SIGNIFICANT CHANGE UP (ref 0.5–1.3)
EGFR: 116 ML/MIN/1.73M2 — SIGNIFICANT CHANGE UP
EOSINOPHIL # BLD AUTO: 0.13 K/UL — SIGNIFICANT CHANGE UP (ref 0–0.5)
EOSINOPHIL NFR BLD AUTO: 1.6 % — SIGNIFICANT CHANGE UP (ref 0–6)
GLUCOSE CSF-MCNC: 60 MG/DLG/24H — SIGNIFICANT CHANGE UP (ref 40–70)
GLUCOSE SERPL-MCNC: 95 MG/DL — SIGNIFICANT CHANGE UP (ref 70–99)
GRAM STN FLD: SIGNIFICANT CHANGE UP
GRAM STN FLD: SIGNIFICANT CHANGE UP
HCT VFR BLD CALC: 42.9 % — SIGNIFICANT CHANGE UP (ref 34.5–45)
HGB BLD-MCNC: 13.6 G/DL — SIGNIFICANT CHANGE UP (ref 11.5–15.5)
IMM GRANULOCYTES NFR BLD AUTO: 0.4 % — SIGNIFICANT CHANGE UP (ref 0–1.5)
LYMPHOCYTES # BLD AUTO: 1.73 K/UL — SIGNIFICANT CHANGE UP (ref 1–3.3)
LYMPHOCYTES # BLD AUTO: 21 % — SIGNIFICANT CHANGE UP (ref 13–44)
MAGNESIUM SERPL-MCNC: 2 MG/DL — SIGNIFICANT CHANGE UP (ref 1.6–2.6)
MCHC RBC-ENTMCNC: 27.5 PG — SIGNIFICANT CHANGE UP (ref 27–34)
MCHC RBC-ENTMCNC: 31.7 GM/DL — LOW (ref 32–36)
MCV RBC AUTO: 86.7 FL — SIGNIFICANT CHANGE UP (ref 80–100)
MONOCYTES # BLD AUTO: 0.52 K/UL — SIGNIFICANT CHANGE UP (ref 0–0.9)
MONOCYTES NFR BLD AUTO: 6.3 % — SIGNIFICANT CHANGE UP (ref 2–14)
NEUTROPHILS # BLD AUTO: 5.82 K/UL — SIGNIFICANT CHANGE UP (ref 1.8–7.4)
NEUTROPHILS # CSF: SIGNIFICANT CHANGE UP % (ref 0–6)
NEUTROPHILS NFR BLD AUTO: 70.5 % — SIGNIFICANT CHANGE UP (ref 43–77)
NRBC NFR CSF: 3 /UL — SIGNIFICANT CHANGE UP (ref 0–5)
PHOSPHATE SERPL-MCNC: 3 MG/DL — SIGNIFICANT CHANGE UP (ref 2.4–4.7)
PLATELET # BLD AUTO: 460 K/UL — HIGH (ref 150–400)
POTASSIUM SERPL-MCNC: 4.3 MMOL/L — SIGNIFICANT CHANGE UP (ref 3.5–5.3)
POTASSIUM SERPL-SCNC: 4.3 MMOL/L — SIGNIFICANT CHANGE UP (ref 3.5–5.3)
PROT CSF-MCNC: 40 MG/DL — SIGNIFICANT CHANGE UP (ref 15–45)
PROT SERPL-MCNC: 7.7 G/DL — SIGNIFICANT CHANGE UP (ref 6.6–8.7)
RBC # BLD: 4.95 M/UL — SIGNIFICANT CHANGE UP (ref 3.8–5.2)
RBC # CSF: 1 /CMM — SIGNIFICANT CHANGE UP (ref 0–1)
RBC # FLD: 13.2 % — SIGNIFICANT CHANGE UP (ref 10.3–14.5)
SODIUM SERPL-SCNC: 140 MMOL/L — SIGNIFICANT CHANGE UP (ref 135–145)
SPECIMEN SOURCE: SIGNIFICANT CHANGE UP
TUBE TYPE: SIGNIFICANT CHANGE UP
WBC # BLD: 8.25 K/UL — SIGNIFICANT CHANGE UP (ref 3.8–10.5)
WBC # FLD AUTO: 8.25 K/UL — SIGNIFICANT CHANGE UP (ref 3.8–10.5)

## 2022-06-22 PROCEDURE — 99222 1ST HOSP IP/OBS MODERATE 55: CPT

## 2022-06-22 PROCEDURE — 70553 MRI BRAIN STEM W/O & W/DYE: CPT | Mod: 26

## 2022-06-22 PROCEDURE — 99232 SBSQ HOSP IP/OBS MODERATE 35: CPT

## 2022-06-22 PROCEDURE — 70545 MR ANGIOGRAPHY HEAD W/DYE: CPT | Mod: 26,59

## 2022-06-22 RX ORDER — VANCOMYCIN HCL 1 G
1250 VIAL (EA) INTRAVENOUS EVERY 12 HOURS
Refills: 0 | Status: DISCONTINUED | OUTPATIENT
Start: 2022-06-22 | End: 2022-06-27

## 2022-06-22 RX ORDER — CEFEPIME 1 G/1
2000 INJECTION, POWDER, FOR SOLUTION INTRAMUSCULAR; INTRAVENOUS ONCE
Refills: 0 | Status: COMPLETED | OUTPATIENT
Start: 2022-06-22 | End: 2022-06-22

## 2022-06-22 RX ORDER — CEFEPIME 1 G/1
INJECTION, POWDER, FOR SOLUTION INTRAMUSCULAR; INTRAVENOUS
Refills: 0 | Status: DISCONTINUED | OUTPATIENT
Start: 2022-06-22 | End: 2022-06-28

## 2022-06-22 RX ORDER — CEFEPIME 1 G/1
2000 INJECTION, POWDER, FOR SOLUTION INTRAMUSCULAR; INTRAVENOUS EVERY 8 HOURS
Refills: 0 | Status: DISCONTINUED | OUTPATIENT
Start: 2022-06-22 | End: 2022-06-28

## 2022-06-22 RX ADMIN — CEFEPIME 100 MILLIGRAM(S): 1 INJECTION, POWDER, FOR SOLUTION INTRAMUSCULAR; INTRAVENOUS at 13:37

## 2022-06-22 RX ADMIN — Medication 166.67 MILLIGRAM(S): at 13:36

## 2022-06-22 RX ADMIN — SODIUM CHLORIDE 50 MILLILITER(S): 9 INJECTION INTRAMUSCULAR; INTRAVENOUS; SUBCUTANEOUS at 19:30

## 2022-06-22 RX ADMIN — CEFEPIME 100 MILLIGRAM(S): 1 INJECTION, POWDER, FOR SOLUTION INTRAMUSCULAR; INTRAVENOUS at 21:42

## 2022-06-22 NOTE — PROGRESS NOTE ADULT - SUBJECTIVE AND OBJECTIVE BOX
INTERVAL HPI/OVERNIGHT EVENTS:  This is a 46yf presented with a hx of having a Covid PCR prior to travel. Pt noted drainage from her left nare which was attributed to allergic rhinitis and the pt continue to drain days later which were then accompanied with headache.  Pt then has temps.  Pt was in [place] and was evaluated and deemed to have a csf leak which was concerning for infection due to temps.  Pt was administered Augmentin.  Pt sought medical attention on 6/20 in the [facility] after being admitted to the hosp and being told that most likely she will need a surgical intervention.   At this time the pt remains afebrile.   PT seen today, She is afebrile. Pt continues to be dripping thru the left nares.     MEDICATIONS  (STANDING):  cefepime   IVPB      cefepime   IVPB 2000 milliGRAM(s) IV Intermittent every 8 hours  sodium chloride 0.9%. 1000 milliLiter(s) (50 mL/Hr) IV Continuous <Continuous>  vancomycin  IVPB 1250 milliGRAM(s) IV Intermittent every 12 hours    MEDICATIONS  (PRN):  acetaminophen     Tablet .. 650 milliGRAM(s) Oral every 6 hours PRN Temp greater or equal to 38C (100.4F), Mild Pain (1 - 3)      Allergies  No Known Allergies  Intolerances      Vital Signs Last 24 Hrs  T(C): 36.9 (22 Jun 2022 15:24), Max: 37.1 (21 Jun 2022 23:31)  T(F): 98.4 (22 Jun 2022 15:24), Max: 98.8 (21 Jun 2022 23:31)  HR: 88 (22 Jun 2022 15:24) (83 - 97)  BP: 120/80 (22 Jun 2022 15:24) (112/72 - 129/81)  BP(mean): --  RR: 18 (22 Jun 2022 15:24) (16 - 18)  SpO2: 94% (22 Jun 2022 15:24) (93% - 96%)       PHYSICAL EXAM  GENERAL: NAD,   HEAD:  Atraumatic, Normocephalic  EYES: EOMI, PERRLA, conjunctiva and sclera clear  ENMT: No tonsillar erythema, exudates, or enlargement; Moist mucous membranes, left nare draining clear fluid.   NECK: Supple  NERVOUS SYSTEM:  Alert & Oriented X3, Good concentration; Motor Strength 5/5 B/L upper and lower extremities; DTRs 2+ intact and symmetric  CHEST/LUNG: Clear bs bilaterally; No rales, rhonchi, wheezing, or rubs  HEART: Regular rate and rhythm; No murmurs, rubs, or gallops  ABDOMEN: Soft, Nontender, Nondistended; Bowel sounds present  EXTREMITIES:  2+ Peripheral Pulses, No edema      LABS:                          13.6   8.25  )-----------( 460      ( 22 Jun 2022 10:08 )             42.9     06-22    140  |  104  |  6.4<L>  ----------------------------<  95  4.3   |  25.0  |  0.50    Ca    9.3      22 Jun 2022 10:08  Phos  3.0     06-22  Mg     2.0     06-22    TPro  7.7  /  Alb  3.8  /  TBili  0.5  /  DBili  x   /  AST  9   /  ALT  9   /  AlkPhos  70  06-22        I&O's Detail    RADIOLOGY & ADDITIONAL TESTS:  < from: CT Head No Cont (06.21.22 @ 10:31) >  ACC: [id number] EXAM:  CT BRAIN                        PROCEDURE DATE:  06/21/2022    IMPRESSION: Stable exam.  --- End of Report ---  < end of copied text >    < from: MR Venogram Head w/ IV Cont (06.22.22 @ 14:43) >  ACC: [id number] EXAM:  MR BRAIN WAW IC FOR SRS                        ACC: [id number] EXAM:  MR VENOGRAM BRAIN IC                        PROCEDURE DATE:  06/22/2022    IMPRESSION: Unremarkable MRI of the brain with and without contrast.   Fluid in the left ethmoid and maxillary sinus may be related to CSF leak   versus sinusitis. Recommend CT cisternogram for further evaluation.   Normal intracranial MR venogram.  --- End of Report ---   end of copied text >

## 2022-06-22 NOTE — CONSULT NOTE ADULT - SUBJECTIVE AND OBJECTIVE BOX
[facility]                                                INFECTIOUS DISEASES  =======================================================                               Migue Vasquez MD#  Yosi Roblero MD*                                     Jorgito Mayer MD*    Essence Magana MD*            Diplomates American Board of Internal Medicine & Infectious Diseases                  # [place] Office - Appt - Tel  [phone] Fax [phone]                * [place] Office - Appt - Tel [phone] Fax [phone]                                  Hospital Consult line:  [phone]  =======================================================      [id number]  SANDY ARZATEOUB   HPI:  This 46y F w/ hx HLD, chronic back pain, presenting for headache. Pt reports that she had a COVID nasal swab done in her left nares prior to traveling to [place] on 5/28/22. Thereafter developed persistent clear rhinorrhea, associated with fever, headache, cough and generalized weakness. She developed fever on 6/2/22 and 6/3/22.  She did not pay much attention to it first. Eventually on 6/7/22, she saw a medical doctor in [place] and was given a course of Amoxicillin/ Clavulanate 875/125mg which she took twice per day for 1 week.  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   Pt returned to the [place] 4 days ago and followed up with neurology. Was sent to ER by Dr. Adames for further evaluation 6/20/22. Pt states she continues to spike fevers up to 102; last took tylenol last night.  (20 Jun 2022 15:49)    patient was admitted on 6/20/22. ID was called on 6/22/22.   patient was given Ceftriaxone and Vanco x 1 dose each on 6/20/22.    Patient was seen in ER.  Cefepime and Vancomycin was started on 6/22/22.      I have personally reviewed the labs and data; pertinent labs and data are listed in this note; please see below.   =======================================================  Past Medical & Surgical Hx:  =====================  PAST MEDICAL & SURGICAL HISTORY:  HLD (hyperlipidemia)  Chronic back pain    Problem List:  ==========  HEALTH ISSUES - PROBLEM Dx:        Social Hx:  =======  no toxic habits currently    FAMILY HISTORY:  no significant family history of immunosuppressive disorders in mother or father   =======================================================    REVIEW OF SYSTEMS:  CONSTITUTIONAL:  FEVER  HEENT:  RUNNY NOSE  CARDIOVASCULAR:  No pressure, squeezing, strangling, tightness, heaviness or aching about the chest, neck, axilla or epigastrium.  RESPIRATORY:  No cough, shortness of breath  GASTROINTESTINAL:  No nausea, vomiting or diarrhea.  GENITOURINARY:  No dysuria, frequency or urgency. No Blood in urine  MUSCULOSKELETAL:  no joint aches, no muscle pain  SKIN:  No change in skin, hair or nails.  NEUROLOGIC:  No Headaches, seizures or weakness.  PSYCHIATRIC:  No disorder of thought or mood.  ENDOCRINE:  No heat or cold intolerance  HEMATOLOGICAL:  No easy bruising or bleeding.    =======================================================  Allergies  No Known Allergies      Antibiotics:  cefepime   IVPB      cefepime   IVPB 2000 milliGRAM(s) IV Intermittent every 8 hours  vancomycin  IVPB 1250 milliGRAM(s) IV Intermittent every 12 hours    Other medications:  sodium chloride 0.9%. 1000 milliLiter(s) IV Continuous <Continuous>     cefepime   IVPB   100 mL/Hr IV Intermittent (06-22-22 @ 13:37)    vancomycin  IVPB   166.67 mL/Hr IV Intermittent (06-22-22 @ 13:36)    cefTRIAXone   IVPB   100 mL/Hr IV Intermittent (06-20-22 @ 13:17)  vancomycin  IVPB   250 mL/Hr IV Intermittent (06-20-22 @ 15:36)    ======================================================  Physical Exam:  ============  T(F): 98.2 (22 Jun 2022 08:23), Max: 98.8 (21 Jun 2022 23:31)  HR: 83 (22 Jun 2022 08:23)  BP: 114/77 (22 Jun 2022 08:23)  RR: 18 (22 Jun 2022 08:23)  SpO2: 93% (22 Jun 2022 08:23) (92% - 96%)  temp max in last 48H T(F): , Max: 98.8 (06-21-22 @ 23:31)    General:  No acute distress.  Eye: Pupils are equal, round and reactive to light, Extraocular movements are intact, Normal conjunctiva.  HENT: Normocephalic, Oral mucosa is moist, No pharyngeal erythema, No sinus tenderness.  Neck: Supple, No lymphadenopathy.  Respiratory: Lungs are clear to auscultation, Respirations are non-labored.  Cardiovascular: Normal rate, Regular rhythm,   Gastrointestinal: Soft, Non-tender, Non-distended, Normal bowel sounds.  Genitourinary: No costovertebral angle tenderness.  Lymphatics: No lymphadenopathy neck,   Musculoskeletal: Normal range of motion, Normal strength.  Integumentary: No rash.  Neurologic: Alert, Oriented, No focal deficits, Cranial Nerves II-XII are grossly intact.  Psychiatric: Appropriate mood & affect.    =======================================================  Labs:                        13.6   8.25  )-----------( 460      ( 22 Jun 2022 10:08 )             42.9     WBC Count: 8.25 K/uL (06-22-22 @ 10:08)  WBC Count: 15.97 K/uL (06-20-22 @ 13:25)    06-22    140  |  104  |  6.4<L>  ----------------------------<  95  4.3   |  25.0  |  0.50    Ca    9.3      22 Jun 2022 10:08  Phos  3.0     06-22  Mg     2.0     06-22    TPro  7.7  /  Alb  3.8  /  TBili  0.5  /  DBili  x   /  AST  9   /  ALT  9   /  AlkPhos  70  06-22      Culture - Blood (collected 06-20-22 @ 17:47)  Source: .Blood Blood-Peripheral    Culture - Blood (collected 06-20-22 @ 17:47)  Source: .Blood Blood-Peripheral      Creatinine, Serum: 0.50 mg/dL (06-22-22 @ 10:08)  Creatinine, Serum: 0.65 mg/dL (06-20-22 @ 13:25)    C-Reactive Protein, Serum: 32 mg/L (06-20-22 @ 13:25)    Sedimentation Rate, Erythrocyte: 38 mm/hr (06-20-22 @ 13:25)      COVID-19 PCR: NotDetec (06-20-22 @ 16:56)       < from: CT Head No Cont (06.21.22 @ 10:31) >    ACC: [id number] EXAM:  CT BRAIN                          PROCEDURE DATE:  06/21/2022          INTERPRETATION:  Clinical indications: CSF leak.    Thin axial sections were performed from base skull to vertex without   contrast. Coronal and sagittal destructions were performed as well    This exam is compared with prior head CT performed on June 20 2022nd.    The lateral ventricles have a normal configuration    Small area of extra-axial fluid is seen involving the posterior fossa   region bilaterally. This is slightly more prominent on the left side than   the right. This appears stable when compared with the prior exam.    There is no acute hemorrhage mass or mass effect seen.    Evaluation of osseous structures with the appropriate window appears unremarkable    Air-fluid levels involving the left maxillary sinus is again seen.    Both mastoid and middle ear regions appear clear.    IMPRESSION: Stable exam.    --- End of Report ---        KT BINGHAM MD; Attending Radiologist  This document has been electronically signed. Jun 21 2022 10:37AM    < end of copied text >

## 2022-06-22 NOTE — CONSULT NOTE ADULT - ASSESSMENT
This 46y F w/ hx HLD, chronic back pain, presenting for headache. Pt reports that she had a COVID nasal swab done in her left nares prior to traveling to [place] on 5/28/22. Thereafter developed persistent clear rhinorrhea, associated with fever, headache, cough and generalized weakness. She developed fever on 6/2/22 and 6/3/22.  She did not pay much attention to it first. Eventually on 6/7/22, she saw a medical doctor in [place] and was given a course of Amoxicillin/ Clavulanate 875/125mg which she took twice per day for 1 week.  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   [place] the [place] 4 [place] ago and followed up with neurology. Was sent to ER by Dr. Adames for further evaluation 6/20/22. Pt states she continues to spike fevers up to 102; last took tylenol last night.  (20 Jun 2022 15:49)    patient was admitted on 6/20/22. ID was called on 6/22/22.   patient was given Ceftriaxone and Vanco x 1 dose each on 6/20/22.    Patient was seen in ER.  Cefepime and Vancomycin was started on 6/22/22.        Impression:  CSF leak  dural fistula/ tear  Fevers  WBC elevation        Plan:  Fevers likely secondary to infection  blood cultures sent from admission are in process, negative so far.   CSF specimen not yet sent.     WBC elevation is reactive  improving  - will follow and trend    Started patient on empiric antibiotics current antibiotics:  cefepime   IVPB 2000 milliGRAM(s) IV Intermittent every 8 hours - empiric coverage of pseudomonas  vancomycin  IVPB 1250 milliGRAM(s) IV Intermittent every 12 hours    MRI was done in ER.  pending results.       surgical plans to be determined

## 2022-06-23 LAB
ALBUMIN SERPL ELPH-MCNC: 3.5 G/DL — SIGNIFICANT CHANGE UP (ref 3.3–5.2)
ALP SERPL-CCNC: 61 U/L — SIGNIFICANT CHANGE UP (ref 40–120)
ALT FLD-CCNC: 6 U/L — SIGNIFICANT CHANGE UP
ANION GAP SERPL CALC-SCNC: 12 MMOL/L — SIGNIFICANT CHANGE UP (ref 5–17)
AST SERPL-CCNC: 7 U/L — SIGNIFICANT CHANGE UP
BILIRUB SERPL-MCNC: 0.2 MG/DL — LOW (ref 0.4–2)
BUN SERPL-MCNC: 5.7 MG/DL — LOW (ref 8–20)
C NEOFORM RRNA SPEC NAA+PROBE-ACNC: SIGNIFICANT CHANGE UP
CALCIUM SERPL-MCNC: 8.8 MG/DL — SIGNIFICANT CHANGE UP (ref 8.6–10.2)
CHLORIDE SERPL-SCNC: 104 MMOL/L — SIGNIFICANT CHANGE UP (ref 98–107)
CMV DNA CSF QL NAA+PROBE: SIGNIFICANT CHANGE UP
CO2 SERPL-SCNC: 22 MMOL/L — SIGNIFICANT CHANGE UP (ref 22–29)
CREAT SERPL-MCNC: 0.57 MG/DL — SIGNIFICANT CHANGE UP (ref 0.5–1.3)
CSF PCR RESULT: DETECTED
E COLI K1 DNA CSF QL NAA+NON-PROBE: SIGNIFICANT CHANGE UP
EGFR: 112 ML/MIN/1.73M2 — SIGNIFICANT CHANGE UP
ESCHERICHIA COLI K1: SIGNIFICANT CHANGE UP
EV RNA CSF QL NAA+PROBE: DETECTED
GLUCOSE SERPL-MCNC: 124 MG/DL — HIGH (ref 70–99)
GP B STREP DNA SPEC QL NAA+PROBE: SIGNIFICANT CHANGE UP
HAEM INFLU DNA SPEC QL NAA+PROBE: SIGNIFICANT CHANGE UP
HCT VFR BLD CALC: 38.2 % — SIGNIFICANT CHANGE UP (ref 34.5–45)
HGB BLD-MCNC: 12 G/DL — SIGNIFICANT CHANGE UP (ref 11.5–15.5)
HHV6 DNA CSF QL NAA+PROBE: SIGNIFICANT CHANGE UP
HSV1 DNA CSF QL NAA+PROBE: SIGNIFICANT CHANGE UP
HSV2 DNA CSF QL NAA+PROBE: SIGNIFICANT CHANGE UP
INR BLD: 1.12 RATIO — SIGNIFICANT CHANGE UP (ref 0.88–1.16)
L MONOCYTOG DNA SPEC QL NAA+PROBE: SIGNIFICANT CHANGE UP
MCHC RBC-ENTMCNC: 27.6 PG — SIGNIFICANT CHANGE UP (ref 27–34)
MCHC RBC-ENTMCNC: 31.4 GM/DL — LOW (ref 32–36)
MCV RBC AUTO: 88 FL — SIGNIFICANT CHANGE UP (ref 80–100)
N MEN DNA SPEC QL NAA+PROBE: SIGNIFICANT CHANGE UP
PARECHOVIRUS A RNA SPEC QL NAA+PROBE: SIGNIFICANT CHANGE UP
PLATELET # BLD AUTO: 475 K/UL — HIGH (ref 150–400)
POTASSIUM SERPL-MCNC: 4.3 MMOL/L — SIGNIFICANT CHANGE UP (ref 3.5–5.3)
POTASSIUM SERPL-SCNC: 4.3 MMOL/L — SIGNIFICANT CHANGE UP (ref 3.5–5.3)
PROT SERPL-MCNC: 6.9 G/DL — SIGNIFICANT CHANGE UP (ref 6.6–8.7)
PROTHROM AB SERPL-ACNC: 13 SEC — SIGNIFICANT CHANGE UP (ref 10.5–13.4)
RBC # BLD: 4.34 M/UL — SIGNIFICANT CHANGE UP (ref 3.8–5.2)
RBC # FLD: 13.2 % — SIGNIFICANT CHANGE UP (ref 10.3–14.5)
S PNEUM DNA SPEC QL NAA+PROBE: SIGNIFICANT CHANGE UP
SODIUM SERPL-SCNC: 137 MMOL/L — SIGNIFICANT CHANGE UP (ref 135–145)
VANCOMYCIN TROUGH SERPL-MCNC: 16 UG/ML — SIGNIFICANT CHANGE UP (ref 10–20)
VZV DNA CSF QL NAA+PROBE: SIGNIFICANT CHANGE UP
WBC # BLD: 9.06 K/UL — SIGNIFICANT CHANGE UP (ref 3.8–10.5)
WBC # FLD AUTO: 9.06 K/UL — SIGNIFICANT CHANGE UP (ref 3.8–10.5)

## 2022-06-23 PROCEDURE — 99232 SBSQ HOSP IP/OBS MODERATE 35: CPT

## 2022-06-23 RX ADMIN — Medication 166.67 MILLIGRAM(S): at 23:38

## 2022-06-23 RX ADMIN — Medication 166.67 MILLIGRAM(S): at 01:33

## 2022-06-23 RX ADMIN — SODIUM CHLORIDE 50 MILLILITER(S): 9 INJECTION INTRAMUSCULAR; INTRAVENOUS; SUBCUTANEOUS at 18:20

## 2022-06-23 RX ADMIN — CEFEPIME 100 MILLIGRAM(S): 1 INJECTION, POWDER, FOR SOLUTION INTRAMUSCULAR; INTRAVENOUS at 15:23

## 2022-06-23 RX ADMIN — Medication 166.67 MILLIGRAM(S): at 13:08

## 2022-06-23 RX ADMIN — CEFEPIME 100 MILLIGRAM(S): 1 INJECTION, POWDER, FOR SOLUTION INTRAMUSCULAR; INTRAVENOUS at 21:54

## 2022-06-23 RX ADMIN — CEFEPIME 100 MILLIGRAM(S): 1 INJECTION, POWDER, FOR SOLUTION INTRAMUSCULAR; INTRAVENOUS at 06:00

## 2022-06-23 NOTE — PROGRESS NOTE ADULT - SUBJECTIVE AND OBJECTIVE BOX
HPI:  46y F w/ hx HLD, chronic back pain, presenting for headache, nasal drainage. Pt reports that she had a COVID nasal swab done in her left nare prior to traveling to [place] on 5/28. Thereafter developed persistent clear rhinorrhea, associated with fever, headache, cough and generalized weakness. Denies neck pain. 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 Pt has since completed 1 week course of augmentin. [place] the [place] 4 [place] ago and followed up with neurology. Was sent in today by Dr. Adames for further evaluation. Pt states she continues to spike fevers up to 102; last took tylenol last night.   (20 Jun 2022 15:49)      INTERVAL HPI/OVERNIGHT EVENTS:  46y Female s/p seen sitting up comfortably in bed. Still w/ persistent clear L nare nasal drainage. Remains afebrile. No leukocytosis.     Vital Signs Last 24 Hrs  T(C): 36.9 (23 Jun 2022 11:21), Max: 36.9 (22 Jun 2022 23:46)  T(F): 98.5 (23 Jun 2022 11:21), Max: 98.5 (23 Jun 2022 11:21)  HR: 83 (23 Jun 2022 11:21) (82 - 88)  BP: 118/75 (23 Jun 2022 11:21) (103/60 - 128/84)  BP(mean): --  RR: 18 (23 Jun 2022 11:21) (16 - 18)  SpO2: 95% (23 Jun 2022 11:21) (93% - 97%)    PHYSICAL EXAM:  GENERAL: NAD, well-groomed, well-developed  ENT: clear nasal drainage actively dripping from L nare  MENTAL STATUS: AAO x3; Awake; Opens eyes spontaneously; Appropriately conversant without aphasia; following simple commands  CRANIAL NERVES: Visual acuity normal for age, visual fields full to confrontation, PERRL. EOMI without nystagmus. Facial sensation intact. Face symmetric w/ normal eye closure and smile, tongue midline. Hearing grossly intact. Speech clear.   MOTOR: strength 5/5 b/l upper and lower extremities  SENSATION: grossly intact to light touch all extremities  CHEST/LUNG: non labored  HEART: Regular rate and rhythm  ABDOMEN: Soft, nontender, nondistended      LABS:                        12.0   9.06  )-----------( 475      ( 23 Jun 2022 03:51 )             38.2     06-23    137  |  104  |  5.7<L>  ----------------------------<  124<H>  4.3   |  22.0  |  0.57    Ca    8.8      23 Jun 2022 03:51  Phos  3.0     06-22  Mg     2.0     06-22    TPro  6.9  /  Alb  3.5  /  TBili  0.2<L>  /  DBili  x   /  AST  7   /  ALT  6   /  AlkPhos  61  06-23    PT/INR - ( 23 Jun 2022 03:51 )   PT: 13.0 sec;   INR: 1.12 ratio

## 2022-06-23 NOTE — PROGRESS NOTE ADULT - SUBJECTIVE AND OBJECTIVE BOX
[facility]                                                INFECTIOUS DISEASES  =======================================================                               Migue Vasquez MD#  Yosi Roblero MD*                                     Jorgito Mayer MD*    Essence Magana MD*            Diplomates American Board of Internal Medicine & Infectious Diseases                  # [place] Office - Appt - Tel  [phone] Fax [phone]                * [place] Office - Appt - Tel [phone] Fax [phone]                                  Hospital Consult line:  [phone]  =======================================================    [id number]  SANDY ARZATEOUB  follow up: CSF leak    feeling better  still with rhinorrhea.   CSF culture sample from nose sent.         I have personally reviewed the labs and data; pertinent labs and data are listed in this note; please see below.   =======================================================  Past Medical & Surgical Hx:  =====================  PAST MEDICAL & SURGICAL HISTORY:  HLD (hyperlipidemia)  Chronic back pain    Problem List:  ==========  HEALTH ISSUES - PROBLEM Dx:        Social Hx:  =======  no toxic habits currently    FAMILY HISTORY:  no significant family history of immunosuppressive disorders in mother or father   =======================================================    REVIEW OF SYSTEMS:  CONSTITUTIONAL:  FEVER  HEENT:  RUNNY NOSE  CARDIOVASCULAR:  No pressure, squeezing, strangling, tightness, heaviness or aching about the chest, neck, axilla or epigastrium.  RESPIRATORY:  No cough, shortness of breath  GASTROINTESTINAL:  No nausea, vomiting or diarrhea.  GENITOURINARY:  No dysuria, frequency or urgency. No Blood in urine  MUSCULOSKELETAL:  no joint aches, no muscle pain  SKIN:  No change in skin, hair or nails.  NEUROLOGIC:  No Headaches, seizures or weakness.  PSYCHIATRIC:  No disorder of thought or mood.  ENDOCRINE:  No heat or cold intolerance  HEMATOLOGICAL:  No easy bruising or bleeding.    =======================================================  Allergies  No Known Allergies     ======================================================  Physical Exam:  ============     General:  No acute distress.  Eye: Pupils are equal, round and reactive to light, Extraocular movements are intact, Normal conjunctiva.  HENT: Normocephalic, Oral mucosa is moist, No pharyngeal erythema, No sinus tenderness.  Neck: Supple, No lymphadenopathy.  Respiratory: Lungs are clear to auscultation, Respirations are non-labored.  Cardiovascular: Normal rate, Regular rhythm,   Gastrointestinal: Soft, Non-tender, Non-distended, Normal bowel sounds.  Genitourinary: No costovertebral angle tenderness.  Lymphatics: No lymphadenopathy neck,   Musculoskeletal: Normal range of motion, Normal strength.  Integumentary: No rash.  Neurologic: Alert, Oriented, No focal deficits, Cranial Nerves II-XII are grossly intact.  Psychiatric: Appropriate mood & affect.    =======================================================  Vitals:  ============  T(F): 98.4 (23 Jun 2022 07:24), Max: 98.4 (22 Jun 2022 15:24)  HR: 87 (23 Jun 2022 07:24)  BP: 123/73 (23 Jun 2022 07:24)  RR: 18 (23 Jun 2022 07:24)  SpO2: 93% (23 Jun 2022 07:24) (93% - 97%)  temp max in last 48H T(F): , Max: 98.8 (06-21-22 @ 23:31)    =======================================================  Current Antibiotics:  cefepime   IVPB      cefepime   IVPB 2000 milliGRAM(s) IV Intermittent every 8 hours  vancomycin  IVPB 1250 milliGRAM(s) IV Intermittent every 12 hours    Other medications:  sodium chloride 0.9%. 1000 milliLiter(s) IV Continuous <Continuous>      =======================================================  Labs:                        12.0   9.06  )-----------( 475      ( 23 Jun 2022 03:51 )             38.2     06-23    137  |  104  |  5.7<L>  ----------------------------<  124<H>  4.3   |  22.0  |  0.57    Ca    8.8      23 Jun 2022 03:51  Phos  3.0     06-22  Mg     2.0     06-22    TPro  6.9  /  Alb  3.5  /  TBili  0.2<L>  /  DBili  x   /  AST  7   /  ALT  6   /  AlkPhos  61  06-23      Culture - CSF with Gram Stain (collected 06-22-22 @ 21:51)  Source: .CSF CSF  Gram Stain (06-22-22 @ 22:32):    No polymorphonuclear cells seen    No organisms seen    by cytocentrifuge    Culture - Blood (collected 06-20-22 @ 17:47)  Source: .Blood Blood-Peripheral    Culture - Blood (collected 06-20-22 @ 17:47)  Source: .Blood Blood-Peripheral        C-Reactive Protein, Serum: 32 mg/L (06-20-22 @ 13:25)    Sedimentation Rate, Erythrocyte: 38 mm/hr (06-20-22 @ 13:25)      COVID-19 PCR: NotDetec (06-20-22 @ 16:56)      =======================================================       < from: CT Head No Cont (06.21.22 @ 10:31) >    ACC: [id number] EXAM:  CT BRAIN                          PROCEDURE DATE:  06/21/2022          INTERPRETATION:  Clinical indications: CSF leak.    Thin axial sections were performed from base skull to vertex without   contrast. Coronal and sagittal destructions were performed as well    This exam is compared with prior head CT performed on June 20 2022nd.    The lateral ventricles have a normal configuration    Small area of extra-axial fluid is seen involving the posterior fossa   region bilaterally. This is slightly more prominent on the left side than   the right. This appears stable when compared with the prior exam.    There is no acute hemorrhage mass or mass effect seen.    Evaluation of osseous structures with the appropriate window appears unremarkable    Air-fluid levels involving the left maxillary sinus is again seen.    Both mastoid and middle ear regions appear clear.    IMPRESSION: Stable exam.    --- End of Report ---        KT BINGHAM MD; Attending Radiologist  This document has been electronically signed. Jun 21 2022 10:37AM    < end of copied text >

## 2022-06-24 LAB
B2 TRANSFERRIN FLD QL: POSITIVE
HCT VFR BLD CALC: 39.3 % — SIGNIFICANT CHANGE UP (ref 34.5–45)
HGB BLD-MCNC: 12.4 G/DL — SIGNIFICANT CHANGE UP (ref 11.5–15.5)
MCHC RBC-ENTMCNC: 28.1 PG — SIGNIFICANT CHANGE UP (ref 27–34)
MCHC RBC-ENTMCNC: 31.6 GM/DL — LOW (ref 32–36)
MCV RBC AUTO: 88.9 FL — SIGNIFICANT CHANGE UP (ref 80–100)
PLATELET # BLD AUTO: 417 K/UL — HIGH (ref 150–400)
RBC # BLD: 4.42 M/UL — SIGNIFICANT CHANGE UP (ref 3.8–5.2)
RBC # FLD: 13.2 % — SIGNIFICANT CHANGE UP (ref 10.3–14.5)
VANCOMYCIN TROUGH SERPL-MCNC: 15.4 UG/ML — SIGNIFICANT CHANGE UP (ref 10–20)
WBC # BLD: 12.47 K/UL — HIGH (ref 3.8–10.5)
WBC # FLD AUTO: 12.47 K/UL — HIGH (ref 3.8–10.5)

## 2022-06-24 PROCEDURE — 99232 SBSQ HOSP IP/OBS MODERATE 35: CPT

## 2022-06-24 PROCEDURE — 99231 SBSQ HOSP IP/OBS SF/LOW 25: CPT

## 2022-06-24 RX ORDER — ENOXAPARIN SODIUM 100 MG/ML
40 INJECTION SUBCUTANEOUS EVERY 24 HOURS
Refills: 0 | Status: DISCONTINUED | OUTPATIENT
Start: 2022-06-24 | End: 2022-06-28

## 2022-06-24 RX ADMIN — CEFEPIME 100 MILLIGRAM(S): 1 INJECTION, POWDER, FOR SOLUTION INTRAMUSCULAR; INTRAVENOUS at 22:07

## 2022-06-24 RX ADMIN — Medication 166.67 MILLIGRAM(S): at 12:18

## 2022-06-24 RX ADMIN — SODIUM CHLORIDE 50 MILLILITER(S): 9 INJECTION INTRAMUSCULAR; INTRAVENOUS; SUBCUTANEOUS at 22:04

## 2022-06-24 RX ADMIN — CEFEPIME 100 MILLIGRAM(S): 1 INJECTION, POWDER, FOR SOLUTION INTRAMUSCULAR; INTRAVENOUS at 14:39

## 2022-06-24 RX ADMIN — CEFEPIME 100 MILLIGRAM(S): 1 INJECTION, POWDER, FOR SOLUTION INTRAMUSCULAR; INTRAVENOUS at 06:06

## 2022-06-24 NOTE — PROGRESS NOTE ADULT - SUBJECTIVE AND OBJECTIVE BOX
NS PA Note  HD#4    Patient is a 47 yo female who presented for headache, nasal drainage to the ED. Pt reported that she had a COVID nasal swab done in her left nare prior to traveling to [place] on 5/28. Thereafter developed persistent clear rhinorrhea, associated with fever, headache, cough and generalized weakness. 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 Pt has since completed 1 week course of augmentin. [place] the [place] 4 [place] ago and followed up with neurology, was sent to the ER for eval.     Patient seen bedside. Patient complains that she still is having persistent clear drainage from the nose and when not coming from the nose, it is coming down the back of the throat, +salty taste, +cough. Patient reports having a headache only when she coughs, no positional headaches. Afebrile. On cefipime and vanco, ID following. CSF + -conner for enterovirus also.     Vital Signs Last 24 Hrs  T(C): 36.8 (24 Jun 2022 09:00), Max: 37.2 (23 Jun 2022 15:35)  T(F): 98.2 (24 Jun 2022 09:00), Max: 99 (23 Jun 2022 15:35)  HR: 87 (24 Jun 2022 09:00) (79 - 96)  BP: 117/73 (24 Jun 2022 09:00) (100/51 - 129/81)  BP(mean): --  RR: 18 (24 Jun 2022 09:00) (17 - 18)  SpO2: 98% (24 Jun 2022 09:00) (93% - 98%)    MEDICATIONS  (STANDING):  cefepime   IVPB      cefepime   IVPB 2000 milliGRAM(s) IV Intermittent every 8 hours  sodium chloride 0.9%. 1000 milliLiter(s) (50 mL/Hr) IV Continuous <Continuous>  vancomycin  IVPB 1250 milliGRAM(s) IV Intermittent every 12 hours    MEDICATIONS  (PRN):  acetaminophen     Tablet .. 650 milliGRAM(s) Oral every 6 hours PRN Temp greater or equal to 38C (100.4F), Mild Pain (1 - 3)    Neuro- Awake, alert, orientedx3  pupils equal and reactive, EOMI  speech clear and appropriate  no obvious nasal drainage at this time  face symmetric, tongue ML  +cough  GROSS well in bed  no drift  5/5 in all extremities                           12.4   12.47 )-----------( 417      ( 24 Jun 2022 07:18 )             39.3   06-23    137  |  104  |  5.7<L>  ----------------------------<  124<H>  4.3   |  22.0  |  0.57    Ca    8.8      23 Jun 2022 03:51    TPro  6.9  /  Alb  3.5  /  TBili  0.2<L>  /  DBili  x   /  AST  7   /  ALT  6   /  AlkPhos  61  06-23    Plan:  -finalizing surgical plan, Dr. Hanson in touch with family and patient   -ID following, cw abx, trend wbc (currently elevated), CSF + for enterovirus   -SCDs in bed, will clarify chemo DVT ppx  -Incentive spirometry  -Seen by Dr. Hanson

## 2022-06-24 NOTE — PROGRESS NOTE ADULT - SUBJECTIVE AND OBJECTIVE BOX
[facility]                                                INFECTIOUS DISEASES  =======================================================                               Migue Vasquez MD#  Yosi Roblero MD*                                     Jorgito Mayer MD*    Essence Magana MD*            Diplomates American Board of Internal Medicine & Infectious Diseases                  # [place] Office - Appt - Tel  [phone] Fax [phone]                * [place] Office - Appt - Tel [phone] Fax [phone]                                  Hospital Consult line:  [phone]  =======================================================    [id number]  SANDY GISSELLE  follow up: CSF leak    feeling better  still with rhinorrhea.       I have personally reviewed the labs and data; pertinent labs and data are listed in this note; please see below.   =======================================================  Past Medical & Surgical Hx:  =====================  PAST MEDICAL & SURGICAL HISTORY:  HLD (hyperlipidemia)  Chronic back pain    Problem List:  ==========  HEALTH ISSUES - PROBLEM Dx:        Social Hx:  =======  no toxic habits currently    FAMILY HISTORY:  no significant family history of immunosuppressive disorders in mother or father   =======================================================    REVIEW OF SYSTEMS:  CONSTITUTIONAL:  FEVER  HEENT:  RUNNY NOSE  CARDIOVASCULAR:  No pressure, squeezing, strangling, tightness, heaviness or aching about the chest, neck, axilla or epigastrium.  RESPIRATORY:  No cough, shortness of breath  GASTROINTESTINAL:  No nausea, vomiting or diarrhea.  GENITOURINARY:  No dysuria, frequency or urgency. No Blood in urine  MUSCULOSKELETAL:  no joint aches, no muscle pain  SKIN:  No change in skin, hair or nails.  NEUROLOGIC:  No Headaches, seizures or weakness.  PSYCHIATRIC:  No disorder of thought or mood.  ENDOCRINE:  No heat or cold intolerance  HEMATOLOGICAL:  No easy bruising or bleeding.    =======================================================  Allergies  No Known Allergies     ======================================================  Physical Exam:  ============     General:  No acute distress.  Eye: Pupils are equal, round and reactive to light, Extraocular movements are intact, Normal conjunctiva.  HENT: Normocephalic, Oral mucosa is moist, No pharyngeal erythema, No sinus tenderness.  Neck: Supple, No lymphadenopathy.  Respiratory: Lungs are clear to auscultation, Respirations are non-labored.  Cardiovascular: Normal rate, Regular rhythm,   Gastrointestinal: Soft, Non-tender, Non-distended, Normal bowel sounds.  Genitourinary: No costovertebral angle tenderness.  Lymphatics: No lymphadenopathy neck,   Musculoskeletal: Normal range of motion, Normal strength.  Integumentary: No rash.  Neurologic: Alert, Oriented, No focal deficits, Cranial Nerves II-XII are grossly intact.  Psychiatric: Appropriate mood & affect.      Vitals:  ============  T(F): 97.8 (24 Jun 2022 06:05), Max: 99 (23 Jun 2022 15:35)  HR: 82 (24 Jun 2022 06:05)  BP: 106/61 (24 Jun 2022 06:05)  RR: 18 (24 Jun 2022 06:05)  SpO2: 94% (24 Jun 2022 06:05) (93% - 96%)  temp max in last 48H T(F): , Max: 99 (06-23-22 @ 15:35)    =======================================================  Current Antibiotics:  cefepime   IVPB      cefepime   IVPB 2000 milliGRAM(s) IV Intermittent every 8 hours  vancomycin  IVPB 1250 milliGRAM(s) IV Intermittent every 12 hours    Other medications:  sodium chloride 0.9%. 1000 milliLiter(s) IV Continuous <Continuous>      =======================================================  Labs:                        12.4   12.47 )-----------( 417      ( 24 Jun 2022 07:18 )             39.3     06-23    137  |  104  |  5.7<L>  ----------------------------<  124<H>  4.3   |  22.0  |  0.57    Ca    8.8      23 Jun 2022 03:51  Phos  3.0     06-22  Mg     2.0     06-22    TPro  6.9  /  Alb  3.5  /  TBili  0.2<L>  /  DBili  x   /  AST  7   /  ALT  6   /  AlkPhos  61  06-23      Culture - CSF with Gram Stain (collected 06-22-22 @ 21:51)  Source: .CSF CSF  Gram Stain (06-22-22 @ 22:32):    No polymorphonuclear cells seen    No organisms seen    by cytocentrifuge    Culture - Blood (collected 06-20-22 @ 17:47)  Source: .Blood Blood-Peripheral    Culture - Blood (collected 06-20-22 @ 17:47)  Source: .Blood Blood-Peripheral        C-Reactive Protein, Serum: 32 mg/L (06-20-22 @ 13:25)    Sedimentation Rate, Erythrocyte: 38 mm/hr (06-20-22 @ 13:25)    COVID-19 PCR: NotDetec (06-20-22 @ 16:56)      =======================================================         < from: CT Head No Cont (06.21.22 @ 10:31) >    ACC: [id number] EXAM:  CT BRAIN                          PROCEDURE DATE:  06/21/2022          INTERPRETATION:  Clinical indications: CSF leak.    Thin axial sections were performed from base skull to vertex without   contrast. Coronal and sagittal destructions were performed as well    This exam is compared with prior head CT performed on June 20 2022nd.    The lateral ventricles have a normal configuration    Small area of extra-axial fluid is seen involving the posterior fossa   region bilaterally. This is slightly more prominent on the left side than   the right. This appears stable when compared with the prior exam.    There is no acute hemorrhage mass or mass effect seen.    Evaluation of osseous structures with the appropriate window appears unremarkable    Air-fluid levels involving the left maxillary sinus is again seen.    Both mastoid and middle ear regions appear clear.    IMPRESSION: Stable exam.    --- End of Report ---        KT BINGHAM MD; Attending Radiologist  This document has been electronically signed. Jun 21 2022 10:37AM    < end of copied text >

## 2022-06-24 NOTE — PROGRESS NOTE ADULT - NS PANP COMMENT GEN_ALL_CORE FT
I had a discussion with the pt daughter and  and the pt as well. I explained them that we try to get the pt in [facility] and we are discussing with Dr Terry in order to perform the procedure next week.

## 2022-06-25 LAB
CULTURE RESULTS: NO GROWTH — SIGNIFICANT CHANGE UP
CULTURE RESULTS: SIGNIFICANT CHANGE UP
CULTURE RESULTS: SIGNIFICANT CHANGE UP
HCT VFR BLD CALC: 38.8 % — SIGNIFICANT CHANGE UP (ref 34.5–45)
HGB BLD-MCNC: 12.4 G/DL — SIGNIFICANT CHANGE UP (ref 11.5–15.5)
MCHC RBC-ENTMCNC: 27.7 PG — SIGNIFICANT CHANGE UP (ref 27–34)
MCHC RBC-ENTMCNC: 32 GM/DL — SIGNIFICANT CHANGE UP (ref 32–36)
MCV RBC AUTO: 86.8 FL — SIGNIFICANT CHANGE UP (ref 80–100)
PLATELET # BLD AUTO: 410 K/UL — HIGH (ref 150–400)
RBC # BLD: 4.47 M/UL — SIGNIFICANT CHANGE UP (ref 3.8–5.2)
RBC # FLD: 13.2 % — SIGNIFICANT CHANGE UP (ref 10.3–14.5)
SPECIMEN SOURCE: SIGNIFICANT CHANGE UP
WBC # BLD: 10.92 K/UL — HIGH (ref 3.8–10.5)
WBC # FLD AUTO: 10.92 K/UL — HIGH (ref 3.8–10.5)

## 2022-06-25 PROCEDURE — 99232 SBSQ HOSP IP/OBS MODERATE 35: CPT

## 2022-06-25 PROCEDURE — 70486 CT MAXILLOFACIAL W/O DYE: CPT | Mod: 26

## 2022-06-25 RX ADMIN — CEFEPIME 100 MILLIGRAM(S): 1 INJECTION, POWDER, FOR SOLUTION INTRAMUSCULAR; INTRAVENOUS at 15:59

## 2022-06-25 RX ADMIN — CEFEPIME 100 MILLIGRAM(S): 1 INJECTION, POWDER, FOR SOLUTION INTRAMUSCULAR; INTRAVENOUS at 05:09

## 2022-06-25 RX ADMIN — Medication 166.67 MILLIGRAM(S): at 13:21

## 2022-06-25 RX ADMIN — Medication 166.67 MILLIGRAM(S): at 00:16

## 2022-06-25 RX ADMIN — CEFEPIME 100 MILLIGRAM(S): 1 INJECTION, POWDER, FOR SOLUTION INTRAMUSCULAR; INTRAVENOUS at 22:29

## 2022-06-25 NOTE — PROGRESS NOTE ADULT - SUBJECTIVE AND OBJECTIVE BOX
Neurosurgery [facility]  Daily note       This is a 46 year old right hand dominant female with a past medical history significant for HLD, chronic back pain, presenting for headache. Pt reports that she had a COVID nasal swab done in her left nare prior to traveling to [place] on 5/28. Thereafter developed persistent clear rhinorrhea, associated with fever, headache, cough and generalized weakness. Denies neck pain. Was evaluated in [place] and had MRI done on 6/9 that showed anterior dural fistula at level of Left ethmoid sinus with CSF leak. Pt has since completed 1 week course of Augmentin. Patient continue to have drainage. Patient was seen during morning rounds.     Vital Signs Last 24 Hrs  T(C): 36.4 (25 Jun 2022 13:05), Max: 37.2 (25 Jun 2022 05:12)  T(F): 97.5 (25 Jun 2022 13:05), Max: 98.9 (25 Jun 2022 05:12)  HR: 93 (25 Jun 2022 13:05) (77 - 96)  BP: 122/79 (25 Jun 2022 13:05) (112/75 - 140/65)  RR: 17 (25 Jun 2022 13:05) (17 - 18)  SpO2: 97% (25 Jun 2022 13:05) (93% - 100%)    PHYSICAL EXAM:  HEAD:  Atraumatic, normocephalic  MENTAL STATUS: A A O x3, conversant, following simple commands  CRANIAL NERVES: PERRL. EOMI without nystagmus. Tongue midline. Hearing grossly intact. Speech clear. Head turning and shoulder shrug intact.   REFLEXES: No pronator drift  MOTOR: strength 5/5 b/l upper and lower extremities  SENSATION: grossly intact to light touch all extremities  HEART: +S1/+S2  ABDOMEN: Soft, nontender, nondistended  EXTREMITIES:  2+ peripheral pulses  SKIN: Warm, dry; no rashes or lesions    LABS:                        12.4   10.92 )-----------( 410      ( 25 Jun 2022 07:24 )             38.8     06-24 @ 07:01  -  06-25 @ 07:00  --------------------------------------------------------  IN: 1030 mL / OUT: 0 mL / NET: 1030 mL    06-25 @ 07:01 - 06-25 @ 15:31  --------------------------------------------------------  IN: 250 mL / OUT: 0 mL / NET: 250 mL      RADIOLOGY & ADDITIONAL TESTS:  ACC: [id number] EXAM:  MR BRAIN WAW IC FOR SRS                        ACC: [id number] EXAM:  MR VENOGRAM BRAIN IC                        PROCEDURE DATE:  06/22/2022      INTERPRETATION:  CLINICAL INDICATION: Headache, CSF rhinorrhea after   being swab for Covid with questionable left-sided CSF leak      Magnetic resonance imaging of the brain was carried out with transaxial   SPGR, FLAIR, fast spin echo T2 weighted images, axial susceptibility   weighted series, diffusion weighted series and sagittal T1 weighted   series on a 1.5 Preethi magnet. Post contrast axial, coronal and sagittal   T1 weighted images were obtained. 10 cc of Gadavist were intravenously   injected, 0 cc were discarded. Postcontrast sagittal MR venography was   obtained.    Comparison is made with the prior CT of 6/20/2022 and 6/21/2022.    Examination is limited by motion artifact    The fourth, third and lateral ventricles are normal in size and position.   There is no hemorrhage, mass or shift of the midline structures. No   abnormal signal intensity is identified within the brain parenchyma on   the T1, T2 or FLAIR sequences. No abnormal subdural collections are   identified. After contrast administration is normal intracranial vascular   enhancement area no abnormal parenchymal or leptomeningeal enhancement is   identified.    There is a partially empty sella which may be a normal variant. There is   fluid in the left ethmoid air cells and air-fluid level in the left   maxillary sinus. This could be related to a CSF leak versus sinusitis.   There appears to be thinning of the left cribriform plate on the prior   CT. Recommend CT cisternogram.        MR venography demonstrates no evidence of venous thrombosis. The normal   intracranial venous circulation is identified. The right transverse sinus   is dominant. The superior sagittal sinus, internal cerebral veins, vein   of Tony, straight sinus, transverse sinuses, sigmoid sinuses and   internal jugular veins are normal. Cortical veins are normal.    IMPRESSION: Unremarkable MRI of the brain with and without contrast.   Fluid in the left ethmoid and maxillary sinus may be related to CSF leak   versus sinusitis. Recommend CT cisternogram for further evaluation.   Normal intracranial MR venogram.    --- End of Report ---

## 2022-06-26 LAB
ALBUMIN SERPL ELPH-MCNC: 3.6 G/DL — SIGNIFICANT CHANGE UP (ref 3.3–5.2)
ALP SERPL-CCNC: 75 U/L — SIGNIFICANT CHANGE UP (ref 40–120)
ALT FLD-CCNC: 8 U/L — SIGNIFICANT CHANGE UP
ANION GAP SERPL CALC-SCNC: 10 MMOL/L — SIGNIFICANT CHANGE UP (ref 5–17)
AST SERPL-CCNC: 8 U/L — SIGNIFICANT CHANGE UP
BASOPHILS # BLD AUTO: 0.05 K/UL — SIGNIFICANT CHANGE UP (ref 0–0.2)
BASOPHILS NFR BLD AUTO: 0.5 % — SIGNIFICANT CHANGE UP (ref 0–2)
BILIRUB SERPL-MCNC: 0.4 MG/DL — SIGNIFICANT CHANGE UP (ref 0.4–2)
BUN SERPL-MCNC: 4.4 MG/DL — LOW (ref 8–20)
CALCIUM SERPL-MCNC: 9.2 MG/DL — SIGNIFICANT CHANGE UP (ref 8.6–10.2)
CHLORIDE SERPL-SCNC: 103 MMOL/L — SIGNIFICANT CHANGE UP (ref 98–107)
CO2 SERPL-SCNC: 25 MMOL/L — SIGNIFICANT CHANGE UP (ref 22–29)
CREAT SERPL-MCNC: 0.56 MG/DL — SIGNIFICANT CHANGE UP (ref 0.5–1.3)
EGFR: 113 ML/MIN/1.73M2 — SIGNIFICANT CHANGE UP
EOSINOPHIL # BLD AUTO: 0.22 K/UL — SIGNIFICANT CHANGE UP (ref 0–0.5)
EOSINOPHIL NFR BLD AUTO: 2.4 % — SIGNIFICANT CHANGE UP (ref 0–6)
GLUCOSE SERPL-MCNC: 89 MG/DL — SIGNIFICANT CHANGE UP (ref 70–99)
HCT VFR BLD CALC: 40.3 % — SIGNIFICANT CHANGE UP (ref 34.5–45)
HGB BLD-MCNC: 12.8 G/DL — SIGNIFICANT CHANGE UP (ref 11.5–15.5)
IMM GRANULOCYTES NFR BLD AUTO: 0.4 % — SIGNIFICANT CHANGE UP (ref 0–1.5)
LYMPHOCYTES # BLD AUTO: 1.81 K/UL — SIGNIFICANT CHANGE UP (ref 1–3.3)
LYMPHOCYTES # BLD AUTO: 19.4 % — SIGNIFICANT CHANGE UP (ref 13–44)
MCHC RBC-ENTMCNC: 28.1 PG — SIGNIFICANT CHANGE UP (ref 27–34)
MCHC RBC-ENTMCNC: 31.8 GM/DL — LOW (ref 32–36)
MCV RBC AUTO: 88.6 FL — SIGNIFICANT CHANGE UP (ref 80–100)
MONOCYTES # BLD AUTO: 0.67 K/UL — SIGNIFICANT CHANGE UP (ref 0–0.9)
MONOCYTES NFR BLD AUTO: 7.2 % — SIGNIFICANT CHANGE UP (ref 2–14)
NEUTROPHILS # BLD AUTO: 6.54 K/UL — SIGNIFICANT CHANGE UP (ref 1.8–7.4)
NEUTROPHILS NFR BLD AUTO: 70.1 % — SIGNIFICANT CHANGE UP (ref 43–77)
PLATELET # BLD AUTO: 439 K/UL — HIGH (ref 150–400)
POTASSIUM SERPL-MCNC: 4.5 MMOL/L — SIGNIFICANT CHANGE UP (ref 3.5–5.3)
POTASSIUM SERPL-SCNC: 4.5 MMOL/L — SIGNIFICANT CHANGE UP (ref 3.5–5.3)
PROT SERPL-MCNC: 7.3 G/DL — SIGNIFICANT CHANGE UP (ref 6.6–8.7)
RBC # BLD: 4.55 M/UL — SIGNIFICANT CHANGE UP (ref 3.8–5.2)
RBC # FLD: 13.3 % — SIGNIFICANT CHANGE UP (ref 10.3–14.5)
SODIUM SERPL-SCNC: 138 MMOL/L — SIGNIFICANT CHANGE UP (ref 135–145)
VANCOMYCIN TROUGH SERPL-MCNC: 14 UG/ML — SIGNIFICANT CHANGE UP (ref 10–20)
WBC # BLD: 9.33 K/UL — SIGNIFICANT CHANGE UP (ref 3.8–10.5)
WBC # FLD AUTO: 9.33 K/UL — SIGNIFICANT CHANGE UP (ref 3.8–10.5)

## 2022-06-26 PROCEDURE — 99232 SBSQ HOSP IP/OBS MODERATE 35: CPT

## 2022-06-26 RX ADMIN — CEFEPIME 100 MILLIGRAM(S): 1 INJECTION, POWDER, FOR SOLUTION INTRAMUSCULAR; INTRAVENOUS at 15:58

## 2022-06-26 RX ADMIN — CEFEPIME 100 MILLIGRAM(S): 1 INJECTION, POWDER, FOR SOLUTION INTRAMUSCULAR; INTRAVENOUS at 21:53

## 2022-06-26 RX ADMIN — Medication 166.67 MILLIGRAM(S): at 01:01

## 2022-06-26 RX ADMIN — Medication 166.67 MILLIGRAM(S): at 13:37

## 2022-06-26 RX ADMIN — CEFEPIME 100 MILLIGRAM(S): 1 INJECTION, POWDER, FOR SOLUTION INTRAMUSCULAR; INTRAVENOUS at 05:09

## 2022-06-26 RX ADMIN — SODIUM CHLORIDE 50 MILLILITER(S): 9 INJECTION INTRAMUSCULAR; INTRAVENOUS; SUBCUTANEOUS at 21:53

## 2022-06-26 NOTE — PHARMACOTHERAPY INTERVENTION NOTE - NSPHARMCOMMASP
ASP - Lab/ test recommended

## 2022-06-26 NOTE — PHARMACOTHERAPY INTERVENTION NOTE - COMMENTS
Spoke to Neurosurgery ICU team. Pt on vancomycin, last SCr from 6/23. Recommended BMP or SCr to assess renal function in setting of vancomycin use. 
  Spoke to Neurosurgery ICU team. Pt on vancomycin, last SCr from 6/23. Recommended BMP or SCr to assess renal function in setting of vancomycin use.    Vancomycin level ordered 6/26 prior to 12:00 dose.  
Vancomycin trough level entered per pharmacy policy, 6/23 at 2300, prior to midnight dose
Vancomycin trough level entered per pharmacy policy, 6/24 at 11pm, prior to 6/25 12am dose

## 2022-06-26 NOTE — PROGRESS NOTE ADULT - SUBJECTIVE AND OBJECTIVE BOX
Neurosurgery [facility]  Daily note     This is a 46 year old right hand dominant female with a past medical history significant for hyperlipidemia, chronic back pain, presenting for headache. Pt reports that she had a COVID nasal swab done in her left nare prior to traveling to [place] on 5/28. Thereafter developed persistent clear rhinorrhea, associated with fever, headache, cough and generalized weakness. Denies neck pain. 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 Pt has since completed 1 week course of augmentin. [place] the [place] 4 [place] ago and followed up with neurology. Patient was seen this morning sitting up in bed, currently without complaints.     Vital Signs Last 24 Hrs  T(C): 37.1 (26 Jun 2022 05:12), Max: 37.1 (25 Jun 2022 20:29)  T(F): 98.7 (26 Jun 2022 05:12), Max: 98.7 (25 Jun 2022 20:29)  HR: 83 (26 Jun 2022 05:12) (81 - 83)  BP: 102/63 (26 Jun 2022 05:12) (102/63 - 110/74)  RR: 18 (26 Jun 2022 05:12) (16 - 18)  SpO2: 94% (26 Jun 2022 05:12) (94% - 95%)    PHYSICAL EXAM:  GENERAL: NAD, well-groomed, well-developed  HEAD:  Atraumatic, normocephalic  MENTAL STATUS: A A O x 3, conversant, following simple commands, recent and remote memory intact   CRANIAL NERVES: PERRL. EOMI, Tongue midline. Hearing grossly intact. Speech clear.  REFLEXES: No pronator drift  MOTOR: strength 5/5 b/l upper and lower extremities  SKIN: Warm, dry; no rashes or lesions    LABS:                        12.8   9.33  )-----------( 439      ( 26 Jun 2022 11:46 )             40.3     06-26    138  |  103  |  4.4<L>  ----------------------------<  89  4.5   |  25.0  |  0.56    Ca    9.2      26 Jun 2022 11:45    TPro  7.3  /  Alb  3.6  /  TBili  0.4  /  DBili  x   /  AST  8   /  ALT  8   /  AlkPhos  75  06-26 06-25 @ 07:01  -  06-26 @ 07:00  --------------------------------------------------------  IN: 1300 mL / OUT: 0 mL / NET: 1300 mL    RADIOLOGY & ADDITIONAL TESTS  ACC: [id number] EXAM:  CT MAXILLOFACIAL                        PROCEDURE DATE:  06/25/2022    INTERPRETATION:  Clinical indication: CSF leak    Serial thin sections on a multi slice scanner were obtained through the   orbits and paranasal sinuses without contrast infusion with sagittal and   coronal computer-generated reconstructed views.    There is thinning of the left cribriform plate and fluid in the left   ethmoid and maxillary sinus likely related to a CSF leak. Best   demonstrated on coronal series 6 image 30. This can be further evaluated   with CSF cisternogram. There is fluid in the left ostiomeatal unit. The   nasal septum is midline. There is left-sided dora bullosa and ethmoid   bullous changes. The right maxillary sinus is clear. The sphenoid and   frontal sinuses are clear. The right ethmoid and maxillary sinus clear.    No acute facial fractures are seen.    The orbital floors and medial lateral orbital walls are intact. The   globes, extraocular muscles and optic nerves are unremarkable. The   mandible is intact and the mandibular condyles are normally located.      IMPRESSION: Thinning of the cribriform plate with fluid in the left   ethmoid and maxillary sinus likely related to CSF leak, best seen on   coronal series 6 image 30.

## 2022-06-27 PROCEDURE — 99232 SBSQ HOSP IP/OBS MODERATE 35: CPT

## 2022-06-27 RX ADMIN — CEFEPIME 100 MILLIGRAM(S): 1 INJECTION, POWDER, FOR SOLUTION INTRAMUSCULAR; INTRAVENOUS at 06:36

## 2022-06-27 RX ADMIN — Medication 166.67 MILLIGRAM(S): at 13:27

## 2022-06-27 RX ADMIN — SODIUM CHLORIDE 50 MILLILITER(S): 9 INJECTION INTRAMUSCULAR; INTRAVENOUS; SUBCUTANEOUS at 19:00

## 2022-06-27 RX ADMIN — Medication 166.67 MILLIGRAM(S): at 00:51

## 2022-06-27 RX ADMIN — CEFEPIME 100 MILLIGRAM(S): 1 INJECTION, POWDER, FOR SOLUTION INTRAMUSCULAR; INTRAVENOUS at 22:37

## 2022-06-27 RX ADMIN — CEFEPIME 100 MILLIGRAM(S): 1 INJECTION, POWDER, FOR SOLUTION INTRAMUSCULAR; INTRAVENOUS at 16:43

## 2022-06-27 NOTE — DIETITIAN INITIAL EVALUATION ADULT - ORAL INTAKE PTA/DIET HISTORY
Pt reported good po intake PTA and currently. Intentionally trying to lose wt, lost 20 lbs x 2 months. Stated ht 5'11". Pt has been following vegan diet x 2 years. Educated on importance of protein and balanced meals.

## 2022-06-27 NOTE — PROGRESS NOTE ADULT - SUBJECTIVE AND OBJECTIVE BOX
HPI Objective Statement: 46y F w/ hx HLD, chronic back pain, presenting for headache. Pt reports that she had a COVID nasal swab done in her left nare prior to traveling to [place] on 5/28. Thereafter developed persistent clear rhinorrhea, associated with fever, headache, cough and generalized weakness. Denies neck pain. 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 Pt has since completed 1 week course of augmentin. [place] to the [place] 4 days ago and followed up with neurology. Was sent in today by Dr. Adames for further evaluation. Pt states she continues to spike fevers up to 102; last took tylenol last night.   (20 Jun 2022 15:49)    46F with CSF leak from the nose and into the throat, salty taste.  ID following, enterovirus in CSF, will d/c vancomycin. No headache, no issues overnight.  Awaiting transfer to [facility] for joint surgery with ENT/NSGY;       MEDICATIONS  (STANDING):  cefepime   IVPB      cefepime   IVPB 2000 milliGRAM(s) IV Intermittent every 8 hours  enoxaparin Injectable 40 milliGRAM(s) SubCutaneous every 24 hours  sodium chloride 0.9%. 1000 milliLiter(s) (50 mL/Hr) IV Continuous <Continuous>    MEDICATIONS  (PRN):  acetaminophen     Tablet .. 650 milliGRAM(s) Oral every 6 hours PRN Temp greater or equal to 38C (100.4F), Mild Pain (1 - 3)    LABS:                        12.8   9.33  )-----------( 439      ( 26 Jun 2022 11:46 )             40.3     06-26    138  |  103  |  4.4<L>  ----------------------------<  89  4.5   |  25.0  |  0.56    Ca    9.2      26 Jun 2022 11:45    TPro  7.3  /  Alb  3.6  /  TBili  0.4  /  DBili  x   /  AST  8   /  ALT  8   /  AlkPhos  75  06-26        COVID-19 PCR: NotDetec (20 Jun 2022 16:56)                          12.8   9.33  )-----------( 439      ( 26 Jun 2022 11:46 )             40.3     06-26    138  |  103  |  4.4<L>  ----------------------------<  89  4.5   |  25.0  |  0.56    Ca    9.2      26 Jun 2022 11:45    TPro  7.3  /  Alb  3.6  /  TBili  0.4  /  DBili  x   /  AST  8   /  ALT  8   /  AlkPhos  75  06-26          Vital Signs Last 24 Hrs  T(C): 37 (27 Jun 2022 08:00), Max: 37 (27 Jun 2022 08:00)  T(F): 98.6 (27 Jun 2022 08:00), Max: 98.6 (27 Jun 2022 08:00)  HR: 82 (27 Jun 2022 08:00) (79 - 97)  BP: 124/78 (27 Jun 2022 08:00) (103/60 - 124/78)  BP(mean): --  RR: 18 (27 Jun 2022 08:00) (18 - 18)  SpO2: 94% (27 Jun 2022 08:00) (93% - 97%)    PHYSICAL EXAM:  A&Ox3   EOMI smile symmetrical tongue midline no drift

## 2022-06-27 NOTE — DIETITIAN INITIAL EVALUATION ADULT - PERTINENT LABORATORY DATA
06-26    138  |  103  |  4.4<L>  ----------------------------<  89  4.5   |  25.0  |  0.56    Ca    9.2      26 Jun 2022 11:45    TPro  7.3  /  Alb  3.6  /  TBili  0.4  /  DBili  x   /  AST  8   /  ALT  8   /  AlkPhos  75  06-26

## 2022-06-27 NOTE — PROGRESS NOTE ADULT - NS ATTEND AMEND GEN_ALL_CORE FT
We are actively trying to find a convenient time for Dr Aldana to operate the pt. The pt will be transferred to [facility] today. Family completely aware of the situation.

## 2022-06-27 NOTE — PROGRESS NOTE ADULT - REASON FOR ADMISSION
Leakage thru nose

## 2022-06-27 NOTE — DIETITIAN INITIAL EVALUATION ADULT - PERTINENT MEDS FT
MEDICATIONS  (STANDING):  cefepime   IVPB      cefepime   IVPB 2000 milliGRAM(s) IV Intermittent every 8 hours  enoxaparin Injectable 40 milliGRAM(s) SubCutaneous every 24 hours  sodium chloride 0.9%. 1000 milliLiter(s) (50 mL/Hr) IV Continuous <Continuous>  vancomycin  IVPB 1250 milliGRAM(s) IV Intermittent every 12 hours    MEDICATIONS  (PRN):  acetaminophen     Tablet .. 650 milliGRAM(s) Oral every 6 hours PRN Temp greater or equal to 38C (100.4F), Mild Pain (1 - 3)

## 2022-06-27 NOTE — DIETITIAN INITIAL EVALUATION ADULT - NS FNS DIET ORDER
Diet, Regular:   DASH/TLC {Sodium & Cholesterol Restricted} (DASH)  Vegan {Accepts Vegetable Products Only} (06-23-22 @ 14:56)

## 2022-06-27 NOTE — PROGRESS NOTE ADULT - SUBJECTIVE AND OBJECTIVE BOX
[facility]                                                INFECTIOUS DISEASES  =======================================================                               Migue Vasquez MD#  Yosi Roblero MD*                                     Jorgito Mayer MD*    Essence Magana MD*            Diplomates American Board of Internal Medicine & Infectious Diseases                  # [place] Office - Appt - Tel  [phone] Fax [phone]                * [place] Office - Appt - Tel [phone] Fax [phone]                                  Hospital Consult line:  [phone]  =======================================================    [id number]  SANDY GISSELLE  follow up: CSF leak    feeling better  still with rhinorrhea.   repeat CSF with enterovirus.   no new issues.       I have personally reviewed the labs and data; pertinent labs and data are listed in this note; please see below.   =======================================================  Past Medical & Surgical Hx:  =====================  PAST MEDICAL & SURGICAL HISTORY:  HLD (hyperlipidemia)  Chronic back pain    Problem List:  ==========  HEALTH ISSUES - PROBLEM Dx:        Social Hx:  =======  no toxic habits currently    FAMILY HISTORY:  no significant family history of immunosuppressive disorders in mother or father   =======================================================    REVIEW OF SYSTEMS:  CONSTITUTIONAL:  FEVER  HEENT:  RUNNY NOSE  CARDIOVASCULAR:  No pressure, squeezing, strangling, tightness, heaviness or aching about the chest, neck, axilla or epigastrium.  RESPIRATORY:  No cough, shortness of breath  GASTROINTESTINAL:  No nausea, vomiting or diarrhea.  GENITOURINARY:  No dysuria, frequency or urgency. No Blood in urine  MUSCULOSKELETAL:  no joint aches, no muscle pain  SKIN:  No change in skin, hair or nails.  NEUROLOGIC:  No Headaches, seizures or weakness.  PSYCHIATRIC:  No disorder of thought or mood.  ENDOCRINE:  No heat or cold intolerance  HEMATOLOGICAL:  No easy bruising or bleeding.    =======================================================  Allergies  No Known Allergies     ======================================================  Physical Exam:  ============     General:  No acute distress.  Eye: Pupils are equal, round and reactive to light, Extraocular movements are intact, Normal conjunctiva.  HENT: Normocephalic, Oral mucosa is moist, No pharyngeal erythema, No sinus tenderness.  Neck: Supple, No lymphadenopathy.  Respiratory: Lungs are clear to auscultation, Respirations are non-labored.  Cardiovascular: Normal rate, Regular rhythm,   Gastrointestinal: Soft, Non-tender, Non-distended, Normal bowel sounds.  Genitourinary: No costovertebral angle tenderness.  Lymphatics: No lymphadenopathy neck,   Musculoskeletal: Normal range of motion, Normal strength.  Integumentary: No rash.  Neurologic: Alert, Oriented, No focal deficits, Cranial Nerves II-XII are grossly intact.  Psychiatric: Appropriate mood & affect.        Vitals:  ============  T(F): 98.6 (27 Jun 2022 08:00), Max: 98.6 (27 Jun 2022 08:00)  HR: 82 (27 Jun 2022 08:00)  BP: 124/78 (27 Jun 2022 08:00)  RR: 18 (27 Jun 2022 08:00)  SpO2: 94% (27 Jun 2022 08:00) (93% - 97%)  temp max in last 48H T(F): , Max: 98.7 (06-25-22 @ 20:29)    =======================================================  Current Antibiotics:  cefepime   IVPB      cefepime   IVPB 2000 milliGRAM(s) IV Intermittent every 8 hours  vancomycin  IVPB 1250 milliGRAM(s) IV Intermittent every 12 hours    Other medications:  enoxaparin Injectable 40 milliGRAM(s) SubCutaneous every 24 hours  sodium chloride 0.9%. 1000 milliLiter(s) IV Continuous <Continuous>      =======================================================  Labs:                        12.8   9.33  )-----------( 439      ( 26 Jun 2022 11:46 )             40.3     06-26    138  |  103  |  4.4<L>  ----------------------------<  89  4.5   |  25.0  |  0.56    Ca    9.2      26 Jun 2022 11:45    TPro  7.3  /  Alb  3.6  /  TBili  0.4  /  DBili  x   /  AST  8   /  ALT  8   /  AlkPhos  75  06-26      Culture - CSF with Gram Stain (collected 06-22-22 @ 21:51)  Source: .CSF CSF  Gram Stain (06-22-22 @ 22:32):    No polymorphonuclear cells seen    No organisms seen    by cytocentrifuge  Final Report (06-25-22 @ 23:47):    No growth    Culture - Blood (collected 06-20-22 @ 17:47)  Source: .Blood Blood-Peripheral  Final Report (06-25-22 @ 18:00):    No Growth Final    Culture - Blood (collected 06-20-22 @ 17:47)  Source: .Blood Blood-Peripheral  Final Report (06-25-22 @ 18:00):    No Growth Final        C-Reactive Protein, Serum: 32 mg/L (06-20-22 @ 13:25)    Sedimentation Rate, Erythrocyte: 38 mm/hr (06-20-22 @ 13:25)      COVID-19 PCR: NotDetec (06-20-22 @ 16:56)      =======================================================         < from: CT Head No Cont (06.21.22 @ 10:31) >    ACC: [id number] EXAM:  CT BRAIN                          PROCEDURE DATE:  06/21/2022          INTERPRETATION:  Clinical indications: CSF leak.    Thin axial sections were performed from base skull to vertex without   contrast. Coronal and sagittal destructions were performed as well    This exam is compared with prior head CT performed on June 20 2022nd.    The lateral ventricles have a normal configuration    Small area of extra-axial fluid is seen involving the posterior fossa   region bilaterally. This is slightly more prominent on the left side than   the right. This appears stable when compared with the prior exam.    There is no acute hemorrhage mass or mass effect seen.    Evaluation of osseous structures with the appropriate window appears unremarkable    Air-fluid levels involving the left maxillary sinus is again seen.    Both mastoid and middle ear regions appear clear.    IMPRESSION: Stable exam.    --- End of Report ---        KT BINGHAM MD; Attending Radiologist  This document has been electronically signed. Jun 21 2022 10:37AM    < end of copied text >

## 2022-06-27 NOTE — CHART NOTE - NSCHARTNOTEFT_GEN_A_CORE
[facility]                                        Neurology at [place]                                 Ania Borjas, & Shaggy                                  [address]                                        [place], [place], [address]                                             [phone]      Patient known to me from the office.   Now with CSF leak likely related to dural fistula in the ethmoid region.  She was seen by neurosurgery and ID.   She has been on antibiotics.    Plan is for transfer to [facility] for surgery this week.     She will follow up with me in the office after her discharge.

## 2022-06-28 ENCOUNTER — TRANSCRIPTION ENCOUNTER (OUTPATIENT)
Age: 47
End: 2022-06-28

## 2022-06-28 ENCOUNTER — INPATIENT (INPATIENT)
Facility: HOSPITAL | Age: 47
LOS: 6 days | Discharge: ANOTHER HOSPITAL | DRG: 25 | End: 2022-07-05
Attending: NEUROLOGICAL SURGERY | Admitting: NEUROLOGICAL SURGERY
Payer: MEDICAID

## 2022-06-28 VITALS
DIASTOLIC BLOOD PRESSURE: 76 MMHG | OXYGEN SATURATION: 97 % | TEMPERATURE: 98 F | SYSTOLIC BLOOD PRESSURE: 116 MMHG | RESPIRATION RATE: 19 BRPM | HEART RATE: 84 BPM

## 2022-06-28 VITALS
SYSTOLIC BLOOD PRESSURE: 108 MMHG | OXYGEN SATURATION: 97 % | DIASTOLIC BLOOD PRESSURE: 73 MMHG | RESPIRATION RATE: 18 BRPM | TEMPERATURE: 98 F | HEART RATE: 99 BPM

## 2022-06-28 DIAGNOSIS — G96.01 CRANIAL CEREBROSPINAL FLUID LEAK, SPONTANEOUS: ICD-10-CM

## 2022-06-28 DIAGNOSIS — Z29.9 ENCOUNTER FOR PROPHYLACTIC MEASURES, UNSPECIFIED: ICD-10-CM

## 2022-06-28 DIAGNOSIS — E78.5 HYPERLIPIDEMIA, UNSPECIFIED: ICD-10-CM

## 2022-06-28 DIAGNOSIS — Z01.818 ENCOUNTER FOR OTHER PREPROCEDURAL EXAMINATION: ICD-10-CM

## 2022-06-28 DIAGNOSIS — R50.9 FEVER, UNSPECIFIED: ICD-10-CM

## 2022-06-28 DIAGNOSIS — G96.00 CEREBROSPINAL FLUID LEAK, UNSPECIFIED: ICD-10-CM

## 2022-06-28 PROBLEM — M54.9 DORSALGIA, UNSPECIFIED: Chronic | Status: ACTIVE | Noted: 2022-06-20

## 2022-06-28 LAB
APTT BLD: 32.4 SEC — SIGNIFICANT CHANGE UP (ref 27.5–35.5)
BASOPHILS # BLD AUTO: 0.06 K/UL — SIGNIFICANT CHANGE UP (ref 0–0.2)
BASOPHILS NFR BLD AUTO: 0.7 % — SIGNIFICANT CHANGE UP (ref 0–2)
BLD GP AB SCN SERPL QL: NEGATIVE — SIGNIFICANT CHANGE UP
COVID-19 SPIKE DOMAIN AB INTERP: POSITIVE
COVID-19 SPIKE DOMAIN ANTIBODY RESULT: >250 U/ML — HIGH
EOSINOPHIL # BLD AUTO: 0.16 K/UL — SIGNIFICANT CHANGE UP (ref 0–0.5)
EOSINOPHIL NFR BLD AUTO: 1.8 % — SIGNIFICANT CHANGE UP (ref 0–6)
HCG SERPL-ACNC: <2 MIU/ML — SIGNIFICANT CHANGE UP
HCT VFR BLD CALC: 40.2 % — SIGNIFICANT CHANGE UP (ref 34.5–45)
HGB BLD-MCNC: 12.7 G/DL — SIGNIFICANT CHANGE UP (ref 11.5–15.5)
IMM GRANULOCYTES NFR BLD AUTO: 0.5 % — SIGNIFICANT CHANGE UP (ref 0–1.5)
INR BLD: 1.21 RATIO — HIGH (ref 0.88–1.16)
LACTATE SERPL-SCNC: 0.8 MMOL/L — SIGNIFICANT CHANGE UP (ref 0.7–2)
LYMPHOCYTES # BLD AUTO: 1.76 K/UL — SIGNIFICANT CHANGE UP (ref 1–3.3)
LYMPHOCYTES # BLD AUTO: 20.2 % — SIGNIFICANT CHANGE UP (ref 13–44)
MCHC RBC-ENTMCNC: 27.5 PG — SIGNIFICANT CHANGE UP (ref 27–34)
MCHC RBC-ENTMCNC: 31.6 GM/DL — LOW (ref 32–36)
MCV RBC AUTO: 87 FL — SIGNIFICANT CHANGE UP (ref 80–100)
MONOCYTES # BLD AUTO: 0.54 K/UL — SIGNIFICANT CHANGE UP (ref 0–0.9)
MONOCYTES NFR BLD AUTO: 6.2 % — SIGNIFICANT CHANGE UP (ref 2–14)
NEUTROPHILS # BLD AUTO: 6.17 K/UL — SIGNIFICANT CHANGE UP (ref 1.8–7.4)
NEUTROPHILS NFR BLD AUTO: 70.6 % — SIGNIFICANT CHANGE UP (ref 43–77)
NRBC # BLD: 0 /100 WBCS — SIGNIFICANT CHANGE UP (ref 0–0)
PLATELET # BLD AUTO: 437 K/UL — HIGH (ref 150–400)
PROCALCITONIN SERPL-MCNC: 0.07 NG/ML — SIGNIFICANT CHANGE UP (ref 0.02–0.1)
PROTHROM AB SERPL-ACNC: 13.9 SEC — HIGH (ref 10.5–13.4)
RBC # BLD: 4.62 M/UL — SIGNIFICANT CHANGE UP (ref 3.8–5.2)
RBC # FLD: 13.3 % — SIGNIFICANT CHANGE UP (ref 10.3–14.5)
RH IG SCN BLD-IMP: POSITIVE — SIGNIFICANT CHANGE UP
SARS-COV-2 IGG+IGM SERPL QL IA: >250 U/ML — HIGH
SARS-COV-2 IGG+IGM SERPL QL IA: POSITIVE
SARS-COV-2 RNA SPEC QL NAA+PROBE: SIGNIFICANT CHANGE UP
WBC # BLD: 8.73 K/UL — SIGNIFICANT CHANGE UP (ref 3.8–10.5)
WBC # FLD AUTO: 8.73 K/UL — SIGNIFICANT CHANGE UP (ref 3.8–10.5)

## 2022-06-28 PROCEDURE — 99232 SBSQ HOSP IP/OBS MODERATE 35: CPT

## 2022-06-28 PROCEDURE — 99223 1ST HOSP IP/OBS HIGH 75: CPT

## 2022-06-28 RX ORDER — CEFEPIME 1 G/1
2 INJECTION, POWDER, FOR SOLUTION INTRAMUSCULAR; INTRAVENOUS
Qty: 0 | Refills: 0 | DISCHARGE
Start: 2022-06-28

## 2022-06-28 RX ORDER — CEFEPIME 1 G/1
0 INJECTION, POWDER, FOR SOLUTION INTRAMUSCULAR; INTRAVENOUS
Qty: 0 | Refills: 0 | DISCHARGE
Start: 2022-06-28

## 2022-06-28 RX ORDER — LANOLIN ALCOHOL/MO/W.PET/CERES
5 CREAM (GRAM) TOPICAL AT BEDTIME
Refills: 0 | Status: DISCONTINUED | OUTPATIENT
Start: 2022-06-28 | End: 2022-06-29

## 2022-06-28 RX ORDER — ACETAMINOPHEN 500 MG
2 TABLET ORAL
Qty: 0 | Refills: 0 | DISCHARGE
Start: 2022-06-28

## 2022-06-28 RX ORDER — ACETAMINOPHEN 500 MG
650 TABLET ORAL EVERY 6 HOURS
Refills: 0 | Status: DISCONTINUED | OUTPATIENT
Start: 2022-06-28 | End: 2022-06-29

## 2022-06-28 RX ORDER — CEFEPIME 1 G/1
2000 INJECTION, POWDER, FOR SOLUTION INTRAMUSCULAR; INTRAVENOUS EVERY 8 HOURS
Refills: 0 | Status: DISCONTINUED | OUTPATIENT
Start: 2022-06-28 | End: 2022-06-29

## 2022-06-28 RX ORDER — CYCLOBENZAPRINE HYDROCHLORIDE 10 MG/1
10 TABLET, FILM COATED ORAL THREE TIMES A DAY
Refills: 0 | Status: DISCONTINUED | OUTPATIENT
Start: 2022-06-28 | End: 2022-06-29

## 2022-06-28 RX ORDER — ENOXAPARIN SODIUM 100 MG/ML
0 INJECTION SUBCUTANEOUS
Qty: 0 | Refills: 0 | DISCHARGE
Start: 2022-06-28

## 2022-06-28 RX ORDER — OXYCODONE HYDROCHLORIDE 5 MG/1
10 TABLET ORAL EVERY 4 HOURS
Refills: 0 | Status: DISCONTINUED | OUTPATIENT
Start: 2022-06-28 | End: 2022-06-29

## 2022-06-28 RX ORDER — OXYCODONE HYDROCHLORIDE 5 MG/1
5 TABLET ORAL EVERY 4 HOURS
Refills: 0 | Status: DISCONTINUED | OUTPATIENT
Start: 2022-06-28 | End: 2022-06-29

## 2022-06-28 RX ORDER — SODIUM CHLORIDE 9 MG/ML
0 INJECTION INTRAMUSCULAR; INTRAVENOUS; SUBCUTANEOUS
Qty: 0 | Refills: 0 | DISCHARGE
Start: 2022-06-28

## 2022-06-28 RX ORDER — POLYETHYLENE GLYCOL 3350 17 G/17G
17 POWDER, FOR SOLUTION ORAL
Refills: 0 | Status: DISCONTINUED | OUTPATIENT
Start: 2022-06-28 | End: 2022-06-29

## 2022-06-28 RX ORDER — ATORVASTATIN CALCIUM 80 MG/1
40 TABLET, FILM COATED ORAL AT BEDTIME
Refills: 0 | Status: DISCONTINUED | OUTPATIENT
Start: 2022-06-28 | End: 2022-06-29

## 2022-06-28 RX ORDER — TOPIRAMATE 25 MG
25 TABLET ORAL DAILY
Refills: 0 | Status: DISCONTINUED | OUTPATIENT
Start: 2022-06-28 | End: 2022-06-29

## 2022-06-28 RX ORDER — SENNA PLUS 8.6 MG/1
2 TABLET ORAL AT BEDTIME
Refills: 0 | Status: DISCONTINUED | OUTPATIENT
Start: 2022-06-28 | End: 2022-06-29

## 2022-06-28 RX ORDER — SODIUM CHLORIDE 9 MG/ML
1000 INJECTION INTRAMUSCULAR; INTRAVENOUS; SUBCUTANEOUS
Refills: 0 | Status: DISCONTINUED | OUTPATIENT
Start: 2022-06-28 | End: 2022-06-29

## 2022-06-28 RX ADMIN — CEFEPIME 100 MILLIGRAM(S): 1 INJECTION, POWDER, FOR SOLUTION INTRAMUSCULAR; INTRAVENOUS at 14:37

## 2022-06-28 RX ADMIN — Medication 100 MILLIGRAM(S): at 14:36

## 2022-06-28 RX ADMIN — CEFEPIME 100 MILLIGRAM(S): 1 INJECTION, POWDER, FOR SOLUTION INTRAMUSCULAR; INTRAVENOUS at 05:48

## 2022-06-28 NOTE — PROGRESS NOTE ADULT - SUBJECTIVE AND OBJECTIVE BOX
[facility]                                                INFECTIOUS DISEASES  =======================================================                               Migue Vasquez MD#  Yosi Roblero MD*                                     Jorgito Mayer MD*    Essence Magana MD*            Diplomates American Board of Internal Medicine & Infectious Diseases                  # [place] Office - Appt - Tel  [phone] Fax [phone]                * [place] Office - Appt - Tel [phone] Fax [phone]                                  Hospital Consult line:  [phone]  =======================================================    [id number]  SANDY GISSELLE  follow up: CSF leak    no fevers  c/o pain in the left arm at prior IV stick site.       I have personally reviewed the labs and data; pertinent labs and data are listed in this note; please see below.   =======================================================  Past Medical & Surgical Hx:  =====================  PAST MEDICAL & SURGICAL HISTORY:  HLD (hyperlipidemia)  Chronic back pain    Problem List:  ==========  HEALTH ISSUES - PROBLEM Dx:        Social Hx:  =======  no toxic habits currently    FAMILY HISTORY:  no significant family history of immunosuppressive disorders in mother or father   =======================================================    REVIEW OF SYSTEMS:  CONSTITUTIONAL:  FEVER  HEENT:  RUNNY NOSE  CARDIOVASCULAR:  No pressure, squeezing, strangling, tightness, heaviness or aching about the chest, neck, axilla or epigastrium.  RESPIRATORY:  No cough, shortness of breath  GASTROINTESTINAL:  No nausea, vomiting or diarrhea.  GENITOURINARY:  No dysuria, frequency or urgency. No Blood in urine  MUSCULOSKELETAL:  no joint aches, no muscle pain  SKIN:   as per HPI  NEUROLOGIC:  No Headaches, seizures or weakness.  PSYCHIATRIC:  No disorder of thought or mood.  ENDOCRINE:  No heat or cold intolerance  HEMATOLOGICAL:  No easy bruising or bleeding.    =======================================================  Allergies  No Known Allergies     ======================================================  Physical Exam:  ============     General:  No acute distress.  Eye: Pupils are equal, round and reactive to light, Extraocular movements are intact, Normal conjunctiva.  HENT: Normocephalic, Oral mucosa is moist, No pharyngeal erythema, No sinus tenderness.  Neck: Supple, No lymphadenopathy.  Respiratory: Lungs are clear to auscultation, Respirations are non-labored.  Cardiovascular: Normal rate, Regular rhythm,   Gastrointestinal: Soft, Non-tender, Non-distended, Normal bowel sounds.  Genitourinary: No costovertebral angle tenderness.  Lymphatics: No lymphadenopathy neck,   Musculoskeletal: Normal range of motion, Normal strength.  Integumentary:   erythema of the left distal upper arm  CHORD palpated along the cephalic vein  in the  left distal upper arm  Neurologic: Alert, Oriented, No focal deficits, Cranial Nerves II-XII are grossly intact.  Psychiatric: Appropriate mood & affect.        ====================  Vitals:  ============  T(F): 98.5 (28 Jun 2022 08:00), Max: 99.3 (27 Jun 2022 20:00)  HR: 90 (28 Jun 2022 08:00)  BP: 124/80 (28 Jun 2022 08:00)  RR: 18 (28 Jun 2022 08:00)  SpO2: 96% (28 Jun 2022 08:00) (96% - 96%)  temp max in last 48H T(F): , Max: 99.3 (06-27-22 @ 20:00)    =======================================================  Current Antibiotics:  cefepime   IVPB      cefepime   IVPB 2000 milliGRAM(s) IV Intermittent every 8 hours  doxycycline monohydrate Capsule 100 milliGRAM(s) Oral every 12 hours    Other medications:  enoxaparin Injectable 40 milliGRAM(s) SubCutaneous every 24 hours  sodium chloride 0.9%. 1000 milliLiter(s) IV Continuous <Continuous>      =======================================================  Labs:           Culture - CSF with Gram Stain (collected 06-22-22 @ 21:51)  Source: .CSF CSF  Gram Stain (06-22-22 @ 22:32):    No polymorphonuclear cells seen    No organisms seen    by cytocentrifuge  Final Report (06-25-22 @ 23:47):    No growth    Culture - Blood (collected 06-20-22 @ 17:47)  Source: .Blood Blood-Peripheral  Final Report (06-25-22 @ 18:00):    No Growth Final    Culture - Blood (collected 06-20-22 @ 17:47)  Source: .Blood Blood-Peripheral  Final Report (06-25-22 @ 18:00):    No Growth Final        C-Reactive Protein, Serum: 32 mg/L (06-20-22 @ 13:25)    Sedimentation Rate, Erythrocyte: 38 mm/hr (06-20-22 @ 13:25)      COVID-19 PCR: NotDetec (06-28-22 @ 00:29)  COVID-19 PCR: NotDetec (06-20-22 @ 16:56)      =======================================================        =======================================================         < from: CT Head No Cont (06.21.22 @ 10:31) >    ACC: [id number] EXAM:  CT BRAIN                          PROCEDURE DATE:  06/21/2022          INTERPRETATION:  Clinical indications: CSF leak.    Thin axial sections were performed from base skull to vertex without   contrast. Coronal and sagittal destructions were performed as well    This exam is compared with prior head CT performed on June 20 2022nd.    The lateral ventricles have a normal configuration    Small area of extra-axial fluid is seen involving the posterior fossa   region bilaterally. This is slightly more prominent on the left side than   the right. This appears stable when compared with the prior exam.    There is no acute hemorrhage mass or mass effect seen.    Evaluation of osseous structures with the appropriate window appears unremarkable    Air-fluid levels involving the left maxillary sinus is again seen.    Both mastoid and middle ear regions appear clear.    IMPRESSION: Stable exam.    --- End of Report ---        KT BINGHAM MD; Attending Radiologist  This document has been electronically signed. Jun 21 2022 10:37AM    < end of copied text >

## 2022-06-28 NOTE — PROGRESS NOTE ADULT - PROVIDER SPECIALTY LIST ADULT
Neurosurgery
Infectious Disease
Neurosurgery
Neurosurgery
Infectious Disease
Neurosurgery

## 2022-06-28 NOTE — CONSULT NOTE ADULT - PROBLEM SELECTOR RECOMMENDATION 3
Patient denies any cardiac or respiratory symptoms, satting well on room air, blood pressures stable  - RCRI score of 0, Class I Risk, 3.9% 30-day risk of death, MI, or cardiac arrest  - EKG is NSR, nonspecific T wave changes  - patient is low risk for intermediate risk procedure  - no further testing is needed at this time

## 2022-06-28 NOTE — H&P ADULT - HISTORY OF PRESENT ILLNESS
46F p/w CSF leak L ethmoid sinus s/p covid swab on 5/28, over past several wks while traveling w/ increaing flow of rhinorrhea, eventually developed sxs cold and meningitis (HA/nausea/cough/gen weakness), adm H on 6/20 febrile to 102, CSF PCR pos enterovirus, ID kept on cefepime. Exam: +rhinorrhea from L nare, otherwise intact, no HA, AVSS.

## 2022-06-28 NOTE — DISCHARGE NOTE PROVIDER - NSDCMRMEDTOKEN_GEN_ALL_CORE_FT
acetaminophen 325 mg oral tablet: 2 tab(s) orally every 6 hours, As needed, Temp greater or equal to 38C (100.4F), Mild Pain (1 - 3)  atorvastatin 40 mg oral tablet: 1 tab(s) orally once a day  cefepime: 2 gram(s) intravenously every 8 hours  cyclobenzaprine 10 mg oral tablet: 1 tab(s) orally 3 times a day  doxycycline monohydrate 50 mg oral capsule: 2 cap(s) orally every 12 hours  hydrocodone-ibuprofen 5 mg-200 mg oral tablet: 1 tab(s) orally every 4 hours  topiramate 25 mg oral tablet: 1 tab(s) orally once a day

## 2022-06-28 NOTE — H&P ADULT - NS ATTEND AMEND GEN_ALL_CORE FT
I had an extensive discission with pt and her daughter. I explained the procedure in details, the goal of the procedure and all the alternatives. I explained the risks of the procedure including infection, fail to stop CSF leak, pneumocephalus, stroke, brain hemorrhage, DVT, PE, MI. The pt accepted the risks and signed the consent.

## 2022-06-28 NOTE — DISCHARGE NOTE NURSING/CASE MANAGEMENT/SOCIAL WORK - PATIENT PORTAL LINK FT
You can access the FollowMyHealth Patient Portal offered by [facility] by registering at the following website: [email or web address]. By joining [employer]’s FollowMyHealth portal, you will also be able to view your health information using other applications (apps) compatible with our system.

## 2022-06-28 NOTE — DISCHARGE NOTE PROVIDER - CARE PROVIDER_API CALL
Jose Antonio Hanson; PhD)  Neurosurgery  [address] [place] [address]  Phone: [phone]  Fax: [phone]  Follow Up Time:

## 2022-06-28 NOTE — DISCHARGE NOTE PROVIDER - NSDCFUSCHEDAPPT_GEN_ALL_CORE_FT
Mazin Aldana  [facility]  [address]  Scheduled Appointment: 06/29/2022    Jose Antonio Hanson  [facility]  NEUROSURG [address]  Scheduled Appointment: 06/29/2022     Jose Antonio Hanson  [facility] Physician [facility]  NEUROSURG [address]  Scheduled Appointment: 06/29/2022

## 2022-06-28 NOTE — H&P ADULT - ASSESSMENT
SANDY PITTS  46F p/w CSF leak L ethmoid sinus s/p covid swab on 5/28, over past several wks while traveling w/ increaing flow of rhinorrhea, eventually developed sxs cold and meningitis (HA/nausea/cough/gen weakness), adm H on 6/20 febrile to 102, CSF PCR pos enterovirus, ID kept on cefepime. Exam: +rhinorrhea from L nare, otherwise intact, no HA, AVSS.   - preop for OR in AM w/ Klironomos, has CT max face w/ thin cuts  - Cont cefepime/doxycycline per ID (doxy for thrombophlebitis)  - hospitalist consulted for clearance, ENT called/[facility]  - keeping as flat as possible given persistent high flow leak  - preop labs pending, consented, npo overnight  - q4h neuro checks

## 2022-06-28 NOTE — CONSULT NOTE ADULT - SUBJECTIVE AND OBJECTIVE BOX
HPI:  46y F w/ hx HLD, chronic back pain, presenting for headache. Pt reports that she had a COVID nasal swab done in her left nare prior to traveling to [place] on 5/28. Thereafter developed persistent clear rhinorrhea, associated with fever, headache, cough and generalized weakness. Denies neck pain. 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 Pt has since completed 1 week course of augmentin. [place] the [place] 4 [place] ago and followed up with neurology. Pt states she continues to spike fevers up to 102, admitted to [facility] for infectious workup. Patient developed cold and meningitis symptoms with fever, headache, nausea, cough, generalized weakness. Patient started on course of vanc and cefepime. CSF PCR came back positive for enterovirus. Patient transferred from [facility] for endoscopic repair of CSF leak.    Medicine consulted for preop clearance. Patient denies any chest pain or shortness of breath, no dyspnea on exertion. Denies any cardiac history, no surgeries in the past.     PAST MEDICAL & SURGICAL HISTORY:  HLD (hyperlipidemia)    Chronic back pain    Social History: No smoking history    FAMILY HISTORY: family history of heart disease in parents    Home Medications:  acetaminophen 325 mg oral tablet: 2 tab(s) orally every 6 hours, As needed, Temp greater or equal to 38C (100.4F), Mild Pain (1 - 3) (28 Jun 2022 13:50)  atorvastatin 40 mg oral tablet: 1 tab(s) orally once a day (20 Jun 2022 16:27)  cefepime: 2 gram(s) intravenously every 8 hours (28 Jun 2022 13:54)  cyclobenzaprine 10 mg oral tablet: 1 tab(s) orally 3 times a day (20 Jun 2022 16:27)  doxycycline monohydrate 50 mg oral capsule: 2 cap(s) orally every 12 hours (28 Jun 2022 13:50)  hydrocodone-ibuprofen 5 mg-200 mg oral tablet: 1 tab(s) orally every 4 hours (20 Jun 2022 16:27)  topiramate 25 mg oral tablet: 1 tab(s) orally once a day (20 Jun 2022 16:27)    REVIEW OF SYSTEMS:    CONSTITUTIONAL: No weakness, fevers or chills  HEAD: + intermittent headaches  EYES/ENT: + L nares rhinorrhea  NECK: No pain or stiffness  RESPIRATORY: No cough, wheezing, hemoptysis; No shortness of breath  CARDIOVASCULAR: No chest pain or palpitations  GASTROINTESTINAL: No abdominal pain; nausea, vomiting;  diarrhea or constipation. No hemetemesis, melena or hematochezia.  GENITOURINARY: No dysuria, frequency or hematuria  NEUROLOGICAL: No numbness or weakness  SKIN: No itching, burning, rashes, or lesions   All other review of systems is negative unless indicated above.    PHYSICAL EXAM:    Vital Signs Last 24 Hrs  T(C): 36.8 (28 Jun 2022 20:50), Max: 37.2 (28 Jun 2022 05:00)  T(F): 98.3 (28 Jun 2022 20:50), Max: 99 (28 Jun 2022 05:00)  HR: 99 (28 Jun 2022 20:50) (84 - 99)  BP: 108/73 (28 Jun 2022 20:50) (108/73 - 124/80)  BP(mean): 88 (28 Jun 2022 16:13) (88 - 88)  RR: 18 (28 Jun 2022 20:50) (18 - 19)  SpO2: 97% (28 Jun 2022 20:50) (94% - 97%)    General: No acute distress.  HEENT: No scleral icterus or injection.  Moist MM.  No oropharyngeal exudates.    Neck: Supple.  Full ROM.  No JVD.     Heart: RRR.  Normal S1 and S2.  No murmurs, rubs, or gallops.   Lungs: CTAB. No wheezes, crackles, or rhonchi.    Abdomen: BS+, soft, NT/ND.   Skin: Warm and dry.  No rashes.  Extremities: No edema, clubbing, or cyanosis.  2+ peripheral pulses b/l.  Musculoskeletal: No deformities. Moving all extremities  Neuro: A&Ox3. 5/5 strength in UE and LE b/l.  Tactile sensation intact in UE and LE b/l    CBC Full  -  ( 28 Jun 2022 23:05 )  WBC Count : 8.73 K/uL  Hemoglobin : 12.7 g/dL  Hematocrit : 40.2 %  Platelet Count - Automated : 437 K/uL  Mean Cell Volume : 87.0 fl  Mean Cell Hemoglobin : 27.5 pg  Mean Cell Hemoglobin Concentration : 31.6 gm/dL  Auto Neutrophil # : 6.17 K/uL  Auto Lymphocyte # : 1.76 K/uL  Auto Monocyte # : 0.54 K/uL  Auto Eosinophil # : 0.16 K/uL  Auto Basophil # : 0.06 K/uL  Auto Neutrophil % : 70.6 %  Auto Lymphocyte % : 20.2 %  Auto Monocyte % : 6.2 %  Auto Eosinophil % : 1.8 %  Auto Basophil % : 0.7 %    PT/INR - ( 28 Jun 2022 23:05 )   PT: 13.9 sec;   INR: 1.21 ratio         PTT - ( 28 Jun 2022 23:05 )  PTT:32.4 sec    < from: CT Maxillofacial No Cont (06.25.22 @ 19:27) >    INTERPRETATION:  Clinical indication: CSF leak    Serial thin sections on a multi slice scanner were obtained through the   orbits and paranasal sinuses without contrast infusion with sagittal and   coronal computer-generated reconstructed views.    There is thinning of the left cribriform plate and fluid in the left   ethmoid and maxillary sinus likely related to a CSF leak. Best   demonstrated on coronal series 6 image 30. This can be further evaluated   with CSF cisternogram. There is fluid in the left ostiomeatal unit. The   nasal septum is midline. There is left-sided dora bullosa and ethmoid   bullous changes. The right maxillary sinus is clear. The sphenoid and   frontal sinuses are clear. The right ethmoid and maxillary sinus clear.    No acute facial fractures are seen.    The orbital floors and medial lateral orbital walls are intact. The   globes, extraocular muscles and optic nerves are unremarkable. The   mandible is intact and the mandibular condyles are normally located.      IMPRESSION: Thinning of the cribriform plate with fluid in the left   ethmoid and maxillary sinus likely related to CSF leak, best seen on   coronal series 6 image 30.    --- End of Report ---    < end of copied text >      EKG reviewed. NSR with nonspecific T wave changes, TWI in isolated V3, QTc 427  Imaging reviewed.  Labs reviewed.

## 2022-06-28 NOTE — DISCHARGE NOTE NURSING/CASE MANAGEMENT/SOCIAL WORK - NSDCPEFALRISK_GEN_ALL_CORE
For information on Fall & Injury Prevention, visit: [email or web address] OR  [email or web address] OR  https://www.cdc.gov/steadi/patient.html

## 2022-06-28 NOTE — CONSULT NOTE ADULT - PROBLEM SELECTOR RECOMMENDATION 2
Patient was spiking fevers at OSH, concern for meningitis  - CSF positive for enterovirus  - continue cefepime/doxycycline per ID recs

## 2022-06-28 NOTE — PROGRESS NOTE ADULT - ASSESSMENT
46yf s/p Covid PCR with csf leak noted from the left nares.    MRI of the brain positive for fluid in the left ethmoid and maxillary sinus may be related to csf leak vs. sinusitis.    Plan  1. csf collected and sent  2. awaiting surgical intervention plans  3. ID consulted and pt placed on Cefepime and vanco  
This 46y F w/ hx HLD, chronic back pain, presenting for headache. Pt reports that she had a COVID nasal swab done in her left nares prior to traveling to [place] on 5/28/22. Thereafter developed persistent clear rhinorrhea, associated with fever, headache, cough and generalized weakness. She developed fever on 6/2/22 and 6/3/22.  She did not pay much attention to it first. Eventually on 6/7/22, she saw a medical doctor in [place] and was given a course of Amoxicillin/ Clavulanate 875/125mg which she took twice per day for 1 week.  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   [place] the [place] 4 [place] ago and followed up with neurology. Was sent to ER by Dr. Adames for further evaluation 6/20/22. Pt states she continues to spike fevers up to 102; last took tylenol last night.  (20 Jun 2022 15:49)    patient was admitted on 6/20/22. ID was called on 6/22/22.   patient was given Ceftriaxone and Vanco x 1 dose each on 6/20/22.    Patient was seen in ER.  Cefepime and Vancomycin was started on 6/22/22.        Impression:  CSF leak  dural fistula/ tear  Fevers  WBC elevation        Plan:  Fevers likely secondary to infection  blood cultures sent from admission are in process, negative so far.     CSF specimen - cultures sent; enterovirus on PCR    WBC elevation is reactive, since normalzed  WBC Count: 9.33 K/uL (06-26-22 @ 11:46)  WBC Count: 10.92 K/uL (06-25-22 @ 07:24)  WBC Count: 12.47 K/uL (06-24-22 @ 07:18)    continue current antibiotics:  cefepime   IVPB 2000 milliGRAM(s) IV Intermittent every 8 hours  off  vancomycin; no MRSA detected      Left arm superficial thrombophlebitis  - start on 7 to 10 day course of Doxycycline 100mg PO BID.         Pending transfer to [facility] for definitive surgery.       Please call me back if I may be of further assistance.  Hospital Consult line:  [phone].  Thank you.  
This 46y F w/ hx HLD, chronic back pain, presenting for headache. Pt reports that she had a COVID nasal swab done in her left nares prior to traveling to [place] on 5/28/22. Thereafter developed persistent clear rhinorrhea, associated with fever, headache, cough and generalized weakness. She developed fever on 6/2/22 and 6/3/22.  She did not pay much attention to it first. Eventually on 6/7/22, she saw a medical doctor in [place] and was given a course of Amoxicillin/ Clavulanate 875/125mg which she took twice per day for 1 week.  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   [place] to the [place] 4 [place] ago and followed up with neurology. Was sent to ER by Dr. Adames for further evaluation 6/20/22. Pt states she continues to spike fevers up to 102; last took tylenol last night.  (20 Jun 2022 15:49)    patient was admitted on 6/20/22. ID was called on 6/22/22.   patient was given Ceftriaxone and Vanco x 1 dose each on 6/20/22.    Patient was seen in ER.  Cefepime and Vancomycin was started on 6/22/22.        Impression:  CSF leak  dural fistula/ tear  Fevers  WBC elevation        Plan:  Fevers likely secondary to infection  blood cultures sent from admission are in process, negative so far.     CSF specimen - cultures sent    WBC elevation is reactive  improving  - will follow and trend    continue current antibiotics:  cefepime   IVPB 2000 milliGRAM(s) IV Intermittent every 8 hours  vancomycin  IVPB 1250 milliGRAM(s) IV Intermittent every 12 hours       surgical plans to be determined 
This 46y F w/ hx HLD, chronic back pain, presenting for headache. Pt reports that she had a COVID nasal swab done in her left nares prior to traveling to [place] on 5/28/22. Thereafter developed persistent clear rhinorrhea, associated with fever, headache, cough and generalized weakness. She developed fever on 6/2/22 and 6/3/22.  She did not pay much attention to it first. Eventually on 6/7/22, she saw a medical doctor in [place] and was given a course of Amoxicillin/ Clavulanate 875/125mg which she took twice per day for 1 week.  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   [place] to the [place] 4 [place] ago and followed up with neurology. Was sent to ER by Dr. Adames for further evaluation 6/20/22. Pt states she continues to spike fevers up to 102; last took tylenol last night.  (20 Jun 2022 15:49)    patient was admitted on 6/20/22. ID was called on 6/22/22.   patient was given Ceftriaxone and Vanco x 1 dose each on 6/20/22.    Patient was seen in ER.  Cefepime and Vancomycin was started on 6/22/22.        Impression:  CSF leak  dural fistula/ tear  Fevers  WBC elevation        Plan:  Fevers likely secondary to infection  blood cultures sent from admission are in process, negative so far.   CSF specimen - cultures sent    WBC elevation is reactive  improving  - will follow and trend    continue  current antibiotics:   cefepime   IVPB 2000 milliGRAM(s) IV Intermittent every 8 hours  vancomycin  IVPB 1250 milliGRAM(s) IV Intermittent every 12 hours         surgical plans to be determined 
This is a 46 year old right hand dominant female with a past medical history significant for hyperlipidemia, chronic back pain, presented with headache, with a CSF leak.    1. Continue neuro checks   2. Encourage out of bed to chair   3. Pneumatic compression device while in bed   4. Tylenol for pain  5. Continue ABX as per Infectious disease   6. Continue Lovenox   7. Possible to transfer this week   8. Plan to be discussed with Dr Hanson 
This 46y F w/ hx HLD, chronic back pain, presenting for headache. Pt reports that she had a COVID nasal swab done in her left nares prior to traveling to [place] on 5/28/22. Thereafter developed persistent clear rhinorrhea, associated with fever, headache, cough and generalized weakness. She developed fever on 6/2/22 and 6/3/22.  She did not pay much attention to it first. Eventually on 6/7/22, she saw a medical doctor in [place] and was given a course of Amoxicillin/ Clavulanate 875/125mg which she took twice per day for 1 week.  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   [place] to the [place] 4 [place] ago and followed up with neurology. Was sent to ER by Dr. Adames for further evaluation 6/20/22. Pt states she continues to spike fevers up to 102; last took tylenol last night.  (20 Jun 2022 15:49)    patient was admitted on 6/20/22. ID was called on 6/22/22.   patient was given Ceftriaxone and Vanco x 1 dose each on 6/20/22.    Patient was seen in ER.  Cefepime and Vancomycin was started on 6/22/22.        Impression:  CSF leak  dural fistula/ tear  Fevers  WBC elevation        Plan:  Fevers likely secondary to infection  blood cultures sent from admission are in process, negative so far.     CSF specimen - cultures sent    WBC elevation is reactive  improving  - will follow and trend    continue current antibiotics:  cefepime   IVPB 2000 milliGRAM(s) IV Intermittent every 8 hours  will stop vancomycin;  no MRSA detected       surgical plans to be determined 
This is a 46 year old right hand dominant female, with a CSF leak.    1. ABX as per ID   2. Continue neuro checks  3. Pneumatic compression device while in bed  4. Tylenol for pain  5. Continue incentive spirometry   6. Plan to be discussed with Dr Hanson 
46yoF p/w rhinorrhea, CSF leak after COVID swab.    -PRN APAP for pain  -Awaiting surgical plan  -ID following: cont Cefepime & Vanco  -6/21 bcx NGTD. 6/22 CSF cx NGTD    d/w Dr Hanson
Imp CSF leak    Plan d/c vancomycin, continue with pre-op planning
Ths is a 46yf presentts csf leak post Covid swab  At this time Neurosurgery and ENT are planning surgical intervention.  Pt will be maintain hydrated  ID following anbx as per the team  fluids ordred.

## 2022-06-28 NOTE — DISCHARGE NOTE PROVIDER - HOSPITAL COURSE
46 year old right hand dominant female with a past medical history significant for hyperlipidemia, chronic back pain, presenting for headache. Pt reports that she had a COVID nasal swab done in her left nare prior to traveling to [place] on 5/28. Thereafter developed persistent clear rhinorrhea, associated with fever, headache, cough and generalized weakness. 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 Pt has since completed 1 week course of augmentin. [place] the [place] 4 [place] ago and followed up with neurology. Pt now planned for transfer to [facility] for nare exploration and possible CSF repair.

## 2022-06-29 ENCOUNTER — APPOINTMENT (OUTPATIENT)
Dept: OTOLARYNGOLOGY | Facility: HOSPITAL | Age: 47
End: 2022-06-29

## 2022-06-29 ENCOUNTER — APPOINTMENT (OUTPATIENT)
Dept: NEUROSURGERY | Facility: HOSPITAL | Age: 47
End: 2022-06-29

## 2022-06-29 ENCOUNTER — RESULT REVIEW (OUTPATIENT)
Age: 47
End: 2022-06-29

## 2022-06-29 ENCOUNTER — TRANSCRIPTION ENCOUNTER (OUTPATIENT)
Age: 47
End: 2022-06-29

## 2022-06-29 DIAGNOSIS — G96.00 CEREBROSPINAL FLUID LEAK, UNSPECIFIED: ICD-10-CM

## 2022-06-29 LAB
A1C WITH ESTIMATED AVERAGE GLUCOSE RESULT: 5.5 % — SIGNIFICANT CHANGE UP (ref 4–5.6)
ALBUMIN SERPL ELPH-MCNC: 4 G/DL — SIGNIFICANT CHANGE UP (ref 3.3–5)
ALP SERPL-CCNC: 77 U/L — SIGNIFICANT CHANGE UP (ref 40–120)
ALT FLD-CCNC: 7 U/L — LOW (ref 10–45)
ANION GAP SERPL CALC-SCNC: 11 MMOL/L — SIGNIFICANT CHANGE UP (ref 5–17)
ANION GAP SERPL CALC-SCNC: 14 MMOL/L — SIGNIFICANT CHANGE UP (ref 5–17)
AST SERPL-CCNC: 10 U/L — SIGNIFICANT CHANGE UP (ref 10–40)
BILIRUB SERPL-MCNC: 0.4 MG/DL — SIGNIFICANT CHANGE UP (ref 0.2–1.2)
BUN SERPL-MCNC: 6 MG/DL — LOW (ref 7–23)
BUN SERPL-MCNC: 6 MG/DL — LOW (ref 7–23)
CALCIUM SERPL-MCNC: 8.9 MG/DL — SIGNIFICANT CHANGE UP (ref 8.4–10.5)
CALCIUM SERPL-MCNC: 9.5 MG/DL — SIGNIFICANT CHANGE UP (ref 8.4–10.5)
CHLORIDE SERPL-SCNC: 104 MMOL/L — SIGNIFICANT CHANGE UP (ref 96–108)
CHLORIDE SERPL-SCNC: 104 MMOL/L — SIGNIFICANT CHANGE UP (ref 96–108)
CHOLEST SERPL-MCNC: 348 MG/DL — HIGH
CO2 SERPL-SCNC: 22 MMOL/L — SIGNIFICANT CHANGE UP (ref 22–31)
CO2 SERPL-SCNC: 23 MMOL/L — SIGNIFICANT CHANGE UP (ref 22–31)
CREAT SERPL-MCNC: 0.64 MG/DL — SIGNIFICANT CHANGE UP (ref 0.5–1.3)
CREAT SERPL-MCNC: 0.68 MG/DL — SIGNIFICANT CHANGE UP (ref 0.5–1.3)
CRP SERPL-MCNC: 35 MG/L — HIGH (ref 0–4)
EGFR: 107 ML/MIN/1.73M2 — SIGNIFICANT CHANGE UP
EGFR: 109 ML/MIN/1.73M2 — SIGNIFICANT CHANGE UP
ERYTHROCYTE [SEDIMENTATION RATE] IN BLOOD: 57 MM/HR — HIGH (ref 0–15)
ESTIMATED AVERAGE GLUCOSE: 111 MG/DL — SIGNIFICANT CHANGE UP (ref 68–114)
GLUCOSE SERPL-MCNC: 123 MG/DL — HIGH (ref 70–99)
GLUCOSE SERPL-MCNC: 94 MG/DL — SIGNIFICANT CHANGE UP (ref 70–99)
HCT VFR BLD CALC: 38.6 % — SIGNIFICANT CHANGE UP (ref 34.5–45)
HDLC SERPL-MCNC: 42 MG/DL — LOW
HGB BLD-MCNC: 12.2 G/DL — SIGNIFICANT CHANGE UP (ref 11.5–15.5)
LIPID PNL WITH DIRECT LDL SERPL: 281 MG/DL — HIGH
MAGNESIUM SERPL-MCNC: 1.9 MG/DL — SIGNIFICANT CHANGE UP (ref 1.6–2.6)
MAGNESIUM SERPL-MCNC: 2 MG/DL — SIGNIFICANT CHANGE UP (ref 1.6–2.6)
MCHC RBC-ENTMCNC: 27.9 PG — SIGNIFICANT CHANGE UP (ref 27–34)
MCHC RBC-ENTMCNC: 31.6 GM/DL — LOW (ref 32–36)
MCV RBC AUTO: 88.1 FL — SIGNIFICANT CHANGE UP (ref 80–100)
NON HDL CHOLESTEROL: 306 MG/DL — HIGH
NRBC # BLD: 0 /100 WBCS — SIGNIFICANT CHANGE UP (ref 0–0)
PHOSPHATE SERPL-MCNC: 3.2 MG/DL — SIGNIFICANT CHANGE UP (ref 2.5–4.5)
PHOSPHATE SERPL-MCNC: 3.5 MG/DL — SIGNIFICANT CHANGE UP (ref 2.5–4.5)
PLATELET # BLD AUTO: 396 K/UL — SIGNIFICANT CHANGE UP (ref 150–400)
POTASSIUM SERPL-MCNC: 3.8 MMOL/L — SIGNIFICANT CHANGE UP (ref 3.5–5.3)
POTASSIUM SERPL-MCNC: 4.1 MMOL/L — SIGNIFICANT CHANGE UP (ref 3.5–5.3)
POTASSIUM SERPL-SCNC: 3.8 MMOL/L — SIGNIFICANT CHANGE UP (ref 3.5–5.3)
POTASSIUM SERPL-SCNC: 4.1 MMOL/L — SIGNIFICANT CHANGE UP (ref 3.5–5.3)
PROT SERPL-MCNC: 7.9 G/DL — SIGNIFICANT CHANGE UP (ref 6–8.3)
RBC # BLD: 4.38 M/UL — SIGNIFICANT CHANGE UP (ref 3.8–5.2)
RBC # FLD: 13.2 % — SIGNIFICANT CHANGE UP (ref 10.3–14.5)
SARS-COV-2 RNA SPEC QL NAA+PROBE: SIGNIFICANT CHANGE UP
SODIUM SERPL-SCNC: 138 MMOL/L — SIGNIFICANT CHANGE UP (ref 135–145)
SODIUM SERPL-SCNC: 140 MMOL/L — SIGNIFICANT CHANGE UP (ref 135–145)
TRIGL SERPL-MCNC: 129 MG/DL — SIGNIFICANT CHANGE UP
WBC # BLD: 16.72 K/UL — HIGH (ref 3.8–10.5)
WBC # FLD AUTO: 16.72 K/UL — HIGH (ref 3.8–10.5)

## 2022-06-29 PROCEDURE — 99291 CRITICAL CARE FIRST HOUR: CPT

## 2022-06-29 PROCEDURE — 31290 NASAL/SINUS ENDOSCOPY SURG: CPT | Mod: LT

## 2022-06-29 PROCEDURE — 62272 THER SPI PNXR DRG CSF: CPT

## 2022-06-29 PROCEDURE — 31290 NASAL/SINUS ENDOSCOPY SURG: CPT | Mod: 80,LT

## 2022-06-29 PROCEDURE — 88304 TISSUE EXAM BY PATHOLOGIST: CPT | Mod: 26

## 2022-06-29 PROCEDURE — 70450 CT HEAD/BRAIN W/O DYE: CPT | Mod: 26

## 2022-06-29 PROCEDURE — 31267 ENDOSCOPY MAXILLARY SINUS: CPT | Mod: LT

## 2022-06-29 PROCEDURE — 99222 1ST HOSP IP/OBS MODERATE 55: CPT

## 2022-06-29 PROCEDURE — 99024 POSTOP FOLLOW-UP VISIT: CPT

## 2022-06-29 PROCEDURE — 88305 TISSUE EXAM BY PATHOLOGIST: CPT | Mod: 26

## 2022-06-29 DEVICE — SURGIFLO MATRIX WITH THROMBIN KIT: Type: IMPLANTABLE DEVICE | Status: FUNCTIONAL

## 2022-06-29 DEVICE — SURGIFOAM PAD 8CM X 12.5CM X 10MM (100): Type: IMPLANTABLE DEVICE | Status: FUNCTIONAL

## 2022-06-29 DEVICE — CATH LUMBAR CLOSED TIP: Type: IMPLANTABLE DEVICE | Status: FUNCTIONAL

## 2022-06-29 DEVICE — DURASEAL: Type: IMPLANTABLE DEVICE | Status: FUNCTIONAL

## 2022-06-29 DEVICE — SURGICEL 2 X 14": Type: IMPLANTABLE DEVICE | Status: FUNCTIONAL

## 2022-06-29 RX ORDER — ONDANSETRON 8 MG/1
4 TABLET, FILM COATED ORAL ONCE
Refills: 0 | Status: DISCONTINUED | OUTPATIENT
Start: 2022-06-29 | End: 2022-07-05

## 2022-06-29 RX ORDER — HYDROMORPHONE HYDROCHLORIDE 2 MG/ML
1 INJECTION INTRAMUSCULAR; INTRAVENOUS; SUBCUTANEOUS ONCE
Refills: 0 | Status: DISCONTINUED | OUTPATIENT
Start: 2022-06-29 | End: 2022-06-29

## 2022-06-29 RX ORDER — HYDROMORPHONE HYDROCHLORIDE 2 MG/ML
1 INJECTION INTRAMUSCULAR; INTRAVENOUS; SUBCUTANEOUS EVERY 4 HOURS
Refills: 0 | Status: DISCONTINUED | OUTPATIENT
Start: 2022-06-29 | End: 2022-06-30

## 2022-06-29 RX ORDER — CYCLOBENZAPRINE HYDROCHLORIDE 10 MG/1
10 TABLET, FILM COATED ORAL THREE TIMES A DAY
Refills: 0 | Status: DISCONTINUED | OUTPATIENT
Start: 2022-06-29 | End: 2022-07-05

## 2022-06-29 RX ORDER — TOPIRAMATE 25 MG
25 TABLET ORAL DAILY
Refills: 0 | Status: DISCONTINUED | OUTPATIENT
Start: 2022-06-29 | End: 2022-07-05

## 2022-06-29 RX ORDER — ATORVASTATIN CALCIUM 80 MG/1
40 TABLET, FILM COATED ORAL AT BEDTIME
Refills: 0 | Status: DISCONTINUED | OUTPATIENT
Start: 2022-06-29 | End: 2022-07-05

## 2022-06-29 RX ORDER — SENNA PLUS 8.6 MG/1
1 TABLET ORAL
Refills: 0 | Status: DISCONTINUED | OUTPATIENT
Start: 2022-06-29 | End: 2022-07-05

## 2022-06-29 RX ORDER — ACETAMINOPHEN 500 MG
1000 TABLET ORAL ONCE
Refills: 0 | Status: COMPLETED | OUTPATIENT
Start: 2022-06-29 | End: 2022-06-29

## 2022-06-29 RX ORDER — SODIUM CHLORIDE 9 MG/ML
1000 INJECTION INTRAMUSCULAR; INTRAVENOUS; SUBCUTANEOUS
Refills: 0 | Status: DISCONTINUED | OUTPATIENT
Start: 2022-06-29 | End: 2022-07-04

## 2022-06-29 RX ORDER — LANOLIN ALCOHOL/MO/W.PET/CERES
5 CREAM (GRAM) TOPICAL AT BEDTIME
Refills: 0 | Status: DISCONTINUED | OUTPATIENT
Start: 2022-06-29 | End: 2022-07-05

## 2022-06-29 RX ORDER — FENTANYL CITRATE 50 UG/ML
25 INJECTION INTRAVENOUS
Refills: 0 | Status: DISCONTINUED | OUTPATIENT
Start: 2022-06-29 | End: 2022-06-29

## 2022-06-29 RX ORDER — SODIUM CHLORIDE 0.65 %
1 AEROSOL, SPRAY (ML) NASAL THREE TIMES A DAY
Refills: 0 | Status: DISCONTINUED | OUTPATIENT
Start: 2022-06-29 | End: 2022-07-05

## 2022-06-29 RX ORDER — HYDROMORPHONE HYDROCHLORIDE 2 MG/ML
0.5 INJECTION INTRAMUSCULAR; INTRAVENOUS; SUBCUTANEOUS
Refills: 0 | Status: DISCONTINUED | OUTPATIENT
Start: 2022-06-29 | End: 2022-06-29

## 2022-06-29 RX ORDER — OXYCODONE HYDROCHLORIDE 5 MG/1
5 TABLET ORAL EVERY 4 HOURS
Refills: 0 | Status: DISCONTINUED | OUTPATIENT
Start: 2022-06-29 | End: 2022-07-05

## 2022-06-29 RX ORDER — POLYETHYLENE GLYCOL 3350 17 G/17G
17 POWDER, FOR SOLUTION ORAL DAILY
Refills: 0 | Status: DISCONTINUED | OUTPATIENT
Start: 2022-06-29 | End: 2022-07-05

## 2022-06-29 RX ORDER — ACETAMINOPHEN 500 MG
1000 TABLET ORAL ONCE
Refills: 0 | Status: COMPLETED | OUTPATIENT
Start: 2022-06-29 | End: 2022-06-30

## 2022-06-29 RX ORDER — ACETAMINOPHEN 500 MG
650 TABLET ORAL EVERY 6 HOURS
Refills: 0 | Status: DISCONTINUED | OUTPATIENT
Start: 2022-06-29 | End: 2022-07-05

## 2022-06-29 RX ORDER — SODIUM CHLORIDE 9 MG/ML
1000 INJECTION, SOLUTION INTRAVENOUS
Refills: 0 | Status: DISCONTINUED | OUTPATIENT
Start: 2022-06-29 | End: 2022-06-29

## 2022-06-29 RX ORDER — CEFEPIME 1 G/1
2000 INJECTION, POWDER, FOR SOLUTION INTRAMUSCULAR; INTRAVENOUS EVERY 8 HOURS
Refills: 0 | Status: DISCONTINUED | OUTPATIENT
Start: 2022-06-29 | End: 2022-06-30

## 2022-06-29 RX ORDER — HYDROMORPHONE HYDROCHLORIDE 2 MG/ML
1 INJECTION INTRAMUSCULAR; INTRAVENOUS; SUBCUTANEOUS EVERY 4 HOURS
Refills: 0 | Status: DISCONTINUED | OUTPATIENT
Start: 2022-06-29 | End: 2022-06-29

## 2022-06-29 RX ORDER — OXYCODONE HYDROCHLORIDE 5 MG/1
10 TABLET ORAL EVERY 4 HOURS
Refills: 0 | Status: DISCONTINUED | OUTPATIENT
Start: 2022-06-29 | End: 2022-06-30

## 2022-06-29 RX ADMIN — CEFEPIME 100 MILLIGRAM(S): 1 INJECTION, POWDER, FOR SOLUTION INTRAMUSCULAR; INTRAVENOUS at 05:52

## 2022-06-29 RX ADMIN — HYDROMORPHONE HYDROCHLORIDE 0.5 MILLIGRAM(S): 2 INJECTION INTRAMUSCULAR; INTRAVENOUS; SUBCUTANEOUS at 19:45

## 2022-06-29 RX ADMIN — Medication 100 MILLIGRAM(S): at 05:52

## 2022-06-29 RX ADMIN — SODIUM CHLORIDE 75 MILLILITER(S): 9 INJECTION INTRAMUSCULAR; INTRAVENOUS; SUBCUTANEOUS at 19:00

## 2022-06-29 RX ADMIN — ATORVASTATIN CALCIUM 40 MILLIGRAM(S): 80 TABLET, FILM COATED ORAL at 21:23

## 2022-06-29 RX ADMIN — Medication 1000 MILLIGRAM(S): at 18:45

## 2022-06-29 RX ADMIN — OXYCODONE HYDROCHLORIDE 10 MILLIGRAM(S): 5 TABLET ORAL at 22:22

## 2022-06-29 RX ADMIN — CEFEPIME 100 MILLIGRAM(S): 1 INJECTION, POWDER, FOR SOLUTION INTRAMUSCULAR; INTRAVENOUS at 21:23

## 2022-06-29 RX ADMIN — CEFEPIME 100 MILLIGRAM(S): 1 INJECTION, POWDER, FOR SOLUTION INTRAMUSCULAR; INTRAVENOUS at 13:12

## 2022-06-29 RX ADMIN — HYDROMORPHONE HYDROCHLORIDE 1 MILLIGRAM(S): 2 INJECTION INTRAMUSCULAR; INTRAVENOUS; SUBCUTANEOUS at 22:10

## 2022-06-29 RX ADMIN — HYDROMORPHONE HYDROCHLORIDE 1 MILLIGRAM(S): 2 INJECTION INTRAMUSCULAR; INTRAVENOUS; SUBCUTANEOUS at 21:40

## 2022-06-29 RX ADMIN — Medication 100 MILLIGRAM(S): at 21:24

## 2022-06-29 RX ADMIN — Medication 1 SPRAY(S): at 21:23

## 2022-06-29 RX ADMIN — OXYCODONE HYDROCHLORIDE 10 MILLIGRAM(S): 5 TABLET ORAL at 21:22

## 2022-06-29 RX ADMIN — HYDROMORPHONE HYDROCHLORIDE 0.5 MILLIGRAM(S): 2 INJECTION INTRAMUSCULAR; INTRAVENOUS; SUBCUTANEOUS at 19:15

## 2022-06-29 RX ADMIN — Medication 400 MILLIGRAM(S): at 18:30

## 2022-06-29 RX ADMIN — SODIUM CHLORIDE 75 MILLILITER(S): 9 INJECTION INTRAMUSCULAR; INTRAVENOUS; SUBCUTANEOUS at 18:30

## 2022-06-29 NOTE — CONSULT NOTE ADULT - PROBLEM SELECTOR RECOMMENDATION 9
- f/u Neuro recs for preop for OR in AM w/ Klironomos, has CT max face w/ thin cuts  - Cont cefepime/doxycycline per ID (doxy for thrombophlebitis)  - keeping as flat as possible given persistent high flow leak  - consented, npo overnight
Patient found to have CSF leak L ethmoid sinus s/p covid swab on 5/28  - plan for endoscopic CSF leak repair in am  - q4h neuro checks  - plan per neurosurgery team

## 2022-06-29 NOTE — PROGRESS NOTE ADULT - ASSESSMENT
ASSESSMENT: HPI:  46F p/w CSF leak L ethmoid sinus s/p covid swab on 5/28, over past several wks while traveling w/ increaing flow of rhinorrhea, eventually developed sxs cold and meningitis (HA/nausea/cough/gen weakness), adm [facility] on 6/20 febrile to 102, CSF PCR pos enterovirus, ID kept on cefepime. Exam: +rhinorrhea from L nare, otherwise intact, no HA, AVSS.  (28 Jun 2022 23:02)      NEURO:  [email or web address]  CT Head at 10-11 pm  HOB 30 deg  LD 10cc/hr pending CTH  Activity: [] OOB as tolerated [x] Bedrest then  PT  OT     PULM:  Incentive spirometry, mobilize as tolerated    CV:  Keep -150mmHg, d/c a-line in AM    RENAL:  IVF until good PO intake, d/c samano in AM    GI:  Diet: Dysphagia screen and then advance diet as tolerated  GI prophylaxis [] not indicated [] PPI [] other:  Bowel regimen [] colace [] senna [] other:    ENDO:   Goal euglycemia (-180)    HEME/ONC:  VTE prophylaxis: [] SCDs [] chemoprophylaxis [] hold chemoprophylaxis due to: [] high risk of DVT/PE on admission due to:    ID:  Ana-op antibiotics    MISC:    SOCIAL/FAMILY:  [] awaiting [] updated at bedside [] family meeting    CODE STATUS:  [x] Full Code [] DNR [] DNI [] Palliative/Comfort Care    DISPOSITION:  [x] ICU [] Stroke Unit [] Floor [] EMU [] RCU [] PCU    [] Patient is at high risk of neurologic deterioration/death due to: cns infection, stroke, bleeding        Contact: [phone] ASSESSMENT: HPI:  46F p/w CSF leak L ethmoid sinus s/p covid swab on 5/28, over past several wks while traveling w/ increaing flow of rhinorrhea, eventually developed sxs cold and meningitis (HA/nausea/cough/gen weakness), adm [facility] on 6/20 febrile to 102, CSF PCR pos enterovirus, ID kept on cefepime. Exam: +rhinorrhea from L nare, otherwise intact, no HA, AVSS.  (28 Jun 2022 23:02)      NEURO:  [email or web address]  CT Head at 10-11 pm  HOB 30 deg  LD 10cc/hr , CTH reviewed, stable  Activity: [] OOB as tolerated [x] Bedrest then  PT  OT     PULM:  Incentive spirometry, mobilize as tolerated    CV:  Keep -150mmHg    RENAL: NS @100  IVF until good PO intake, d/c samano in AM    GI: reg diet  Diet: Dysphagia screen and then advance diet as tolerated  GI prophylaxis  not indicated  Bowel regimen standing    ENDO:  a1c 5.5  Goal euglycemia (-180)    HEME/ONC:  VTE prophylaxis: [] SCDs [ hold chemoprophylaxis due to: pod0    ID: cefepime, augmentin, doxycycline  FU w ID deescalation of abx  Ana-op antibiotics    MISC:    SOCIAL/FAMILY:  [] awaiting [] updated at bedside [] family meeting    CODE STATUS:  [x] Full Code [] DNR [] DNI [] Palliative/Comfort Care    DISPOSITION:  [x] ICU [] Stroke Unit [] Floor [] EMU [] RCU [] PCU    [] Patient is at high risk of neurologic deterioration/death due to: cns infection, stroke, bleeding        Contact: [phone]

## 2022-06-29 NOTE — PROGRESS NOTE ADULT - ASSESSMENT
47 YO F with CSF leak from L ethmoid sinus S/P  covid swab on 5/28 at [facility] MD S/P Endonasal CSF Leak repair today. CSF leak in OR, repaired and LD placed @10cc/hr.  C/o frontal HA, 8/10 pain, relived with pain meds. No CSF Leak noted on exam.

## 2022-06-29 NOTE — PHYSICAL THERAPY INITIAL EVALUATION ADULT - ADDITIONAL COMMENTS
per : lives with  in private house with 4-5 steps to enter with bilateral rails (far apart), 1 flight inside with 1 rail, right hand dominant, shower stall per : lives with  in private house with 4-5 steps to enter with bilateral rails (far apart), 1 flight inside with 1 rail, right hand dominant, shower stall. PTA Pt states she was ambulating (I) without an AD and was (I) with all ADLS. Pt states she lives with her children who can also assist

## 2022-06-29 NOTE — PHYSICAL THERAPY INITIAL EVALUATION ADULT - PRECAUTIONS/LIMITATIONS, REHAB EVAL
Pt with +CSF leak from nose & into throat, salty taste, +enterovirus in CSF. transferred to [facility] 6/28 for joint surgery with ENT/NSGY. Pt with +CSF leak from nose & into throat, salty taste, +enterovirus in CSF. transferred to [facility] 6/28 for joint surgery with ENT/NSGY. s/p Endoscopic endonasal repair of CSF leak with lumbar drain 6/29. do not blow nose, do not use straws, do not use incentive spirometer, no excessive bending forward do not blow nose, do not use straws, do not use incentive spirometer, no excessive bending forward/fall precautions

## 2022-06-29 NOTE — PATIENT PROFILE ADULT - NSPROPTRIGHTNOTIFY_GEN_A_NUR
Cystic Fibrosis - Lung Transplant Daily Progress Note   March 25, 2017     Transplants:  6/25/2008 (Lung), Postoperative day:  3195         Assessment and Plan:   Tamar Jhaveri is a 72 year old female with COPD s/p left SLT in 2008 admitted on 3/24/2017 with acute hypoxic respiratory failure after bronchoscopy performed to evaluate evolving infiltrates in her transplanted lung.    Acute Hypoxic Respiratory Failure:  Has had progressive left lung infiltrates over the past few weeks but did not require supplemental O2 until the day after her bronchoscopy, when she had rapid decline.  The working differential is an infectious process vs less likely acute rejection of her transplanted lung, but she's had no fevers/chills, no sputum production.  Bronch was positive for E coli which should be well covered, as well as a single colony of filamentous fungus that has yet to be speciated.  -Start micafungin for some degree of fungal coverage until the organism is speciated  -Continue jean paul/levo/vanco pending culture finalization  -Add in methylpred 1 gram daily x 3 days followed by a taper as the infiltrates developed after stopping her azathioprine.  -Can transition to HFNC given her degree of desaturation with movement and cares.    S/P LSLT for COPD in 2008:  Tacro target goal of 10.  Azathioprine stopped in December 2016 due to diarrhea and weight loss.  On admission was on 5 mg/day prednisone but this was stopped at the initiation of her methylprednisone burst.    -Tac level 3/27  -Bactrim for proph.    PTLD:  polymorphic, treated for 4 doses of rituximab in the fall of 2016. Her EBV were viral load has decreased very significantly and is below detectable level after going off AZA.     CKD:  Stable disease    Seen and discussed with Dr. Britton Hayes MD  Pulmonary and Critical Care Fellow  [phone]              Interval History:     Marked worsening in hypoxia overnight without fevers sputum production.   Desatting to the 80s on 10 LPM during activity with slow recovery.  Remains talkative and upbeat.  Single episode of diarrhea today.           Review of Systems:     C: no fever, no chills, no change in weight  INTEGUMENTARY/SKIN: no rash, no obvious new lesions  ENT/MOUTH: no sore throat, no new sinus pain, no new nasal drainage  RESP: see interval history  CV: negative for chest pain, no palpitations, no peripheral edema  GI: nOne episode of diarrhea today  : no dysuria  MUSCULOSKELETAL: no myalgias, no arthralgias  ENDOCRINE: blood sugars with adequate control  PSYCHIATRIC: mood stable          Medications:       methylPREDNISolone  1,000 mg Intravenous Q24H     [START ON 3/26/2017] micafungin  100 mg Intravenous Q24H     meropenem  1 g Intravenous Q12H     insulin aspart  1-7 Units Subcutaneous TID AC     insulin aspart  1-5 Units Subcutaneous At Bedtime     calcium carbonate  1,250 mg Oral Daily     cholecalciferol (vitamin D) tablet 1,000 Units  1,000 Units Oral Daily     ipratropium  1 spray Both Nostrils TID     levothyroxine  75 mcg Oral Daily     metoprolol  25 mg Oral BID     multivitamin, therapeutic   Oral Daily     pantoprazole  40 mg Oral Daily     sulfamethoxazole-trimethoprim  1 tablet Oral Once per day on Mon Wed Fri     tacrolimus  1.5 mg Oral QAM     tacrolimus  1 mg Oral QPM     [START ON 3/26/2017] vancomycin (VANCOCIN) IV  1,000 mg Intravenous Q48H     clonazePAM, loperamide, naloxone, acetaminophen, hydrALAZINE         Physical Exam:   Temp:  [96.5  F (35.8  C)-98.5  F (36.9  C)] 98.4  F (36.9  C)  Pulse:  [91] 91  Heart Rate:  [] 91  Resp:  [15-20] 20  BP: (122-164)/(79-99) 144/80  SpO2:  [85 %-98 %] 95 %    Intake/Output Summary (Last 24 hours) at 03/25/17 1648  Last data filed at 03/25/17 1249   Gross per 24 hour   Intake              540 ml   Output              450 ml   Net               90 ml       Constitutional:   Awake, alert     Eyes:   nonicteric     ENT:    oral mucosa  moist without lesions     Neck:   Supple without supraclavicular or cervical lymphadenopathy     Lungs:   Coarse crackles throughout the left     Cardiovascular:   Normal S1 and S2.  RRR.  No murmur, gallop or rub.     Abdomen:   NABS, soft, nontender, nondistended.  No HSM.     Musculoskeletal:   No edema     Neurologic:   Alert and conversant.     Skin:   Warm, dry.  No rash on limited exam.             Data:   All laboratory and imaging data reviewed.      Data:  CMP  Recent Labs  Lab 03/25/17  0746 03/24/17  0913 03/20/17  0958    138 143   POTASSIUM 4.4 4.1 4.1   CHLORIDE 104 102 105   CO2 29 31 31   ANIONGAP 8 5 7   * 109* 93   BUN 28 33* 25   CR 1.40* 1.88* 1.69*   GFRESTIMATED 37* 26* 30*   GFRESTBLACK 44* 32* 36*   JUMANA 9.7 9.0 8.8   MAG 2.1 2.0 2.1   PHOS 2.9 2.9  --      CBC  Recent Labs  Lab 03/25/17  0746 03/24/17  0913 03/20/17  0958   WBC 18.1* 17.6* 11.0   RBC 4.57 4.12 3.79*   HGB 14.3 13.0 12.3   HCT 43.7 40.3 37.7   MCV 96 98 100   MCH 31.3 31.6 32.5   MCHC 32.7 32.3 32.6   RDW 13.0 13.2 12.4    421 380     INR  Recent Labs  Lab 03/20/17  0958   INR 1.04     Arterial Blood Gas  Recent Labs  Lab 03/24/17  1246   O2PER 6L     Urine Studies  No lab results found.  CMV viral loads  Recent Labs   Lab Test  03/21/17   1001  03/20/17   0959  03/16/17   1005  02/03/17   1002  01/05/17   0939  12/13/16   0653  11/01/16   1200  10/11/16   0838  09/20/16   1403   12/01/14   0855  06/02/14   0854  12/02/13   0852  06/04/13   0654  11/19/12   0812  05/15/12   0911  06/15/10   0959  03/05/10   1042  02/22/10   0950   CSPEC  Bronchoalveolar Lavage  Plasma  Plasma, EDTA anticoagulant  Plasma  Plasma, EDTA anticoagulant  (Note)  as    Plasma  Plasma, EDTA anticoagulant  Plasma, EDTA anticoagulant  Plasma   < >  Whole blood, EDTA anticoagulant  Whole blood, EDTA anticoagulant  Whole blood, EDTA anticoagulant  Whole blood, EDTA anticoagulant  Whole blood, EDTA anticoagulant  Whole blood, EDTA  anticoagulant  Whole blood, EDTA anticoagulant  Whole blood, EDTA anticoagulant  Whole blood, EDTA anticoagulant   CMQNT   --    --    --    --    --    --    --    --    --    --   <100  <100  <100  <100 No CMV DNA detected.  <100 No CMV DNA detected.  <100 No CMV DNA detected.  <100  <100  <100    < > = values in this interval not displayed.     CMV Quantitative   Date Value Ref Range Status   12/01/2014 <100 <100 Copies/mL Final   06/02/2014 <100 <100 Copies/mL Final   12/02/2013 <100 <100 Copies/mL Final   06/04/2013 <100  No CMV DNA detected. <100 Copies/mL Final   11/19/2012 <100  No CMV DNA detected. <100 Copies/mL Final   05/15/2012 <100  No CMV DNA detected. <100 Copies/mL Final   06/15/2010 <100 <100 Copies/mL Final     Comment:     No CMV DNA detected.   03/05/2010 <100 <100 Copies/mL Final     Comment:     No CMV DNA detected.   02/22/2010 <100 <100 Copies/mL Final     Comment:     No CMV DNA detected.   11/23/2009 <100 <100 Copies/mL Final     Comment:     No CMV DNA detected.   05/12/2009 <100 <100 Copies/mL Final     Comment:     No CMV DNA detected.   03/26/2009 <100 <100 Copies/mL Final     Comment:     No CMV DNA detected.   03/10/2009 <100 <100 Copies/mL Final     Comment:     No CMV DNA detected.   02/20/2009 <100 <100 Copies/mL Final     Comment:     No CMV DNA detected.   02/10/2009 <100 <100 Copies/mL Final     Comment:     No CMV DNA detected.   02/03/2009 <100 <100 Copies/mL Final     Comment:     No CMV DNA detected.   01/08/2009 4900 (H) <100 Copies/mL Final   01/06/2009 3200 (H) <100 Copies/mL Final     Comment:     CMV DNA detected, moderate risk of CMV disease. Suggest continuing to monitor   levels.   11/20/2008 <100 <100 Copies/mL Final     Comment:     No CMV DNA detected.   09/08/2008 <100 <100 Copies/mL Final     Comment:     No CMV DNA detected.   09/03/2008 <100 <100 Copies/mL Final     Comment:     No CMV DNA detected.   09/02/2008 <100 <100 Copies/mL Final     Comment:     No  CMV DNA detected.   08/11/2008 <100 <100 Copies/mL Final     Comment:     No CMV DNA detected.   08/06/2008 <100 <100 Copies/mL Final     Comment:     No CMV DNA detected.   07/30/2008 <100 <100 Copies/mL Final     Comment:     No CMV DNA detected.     EBV viral loads   Recent Labs   Lab Test  03/20/17   0958  03/16/17   1005  02/03/17   1002  01/19/17   0652  01/05/17   0939  10/11/16   0838  09/20/16   1403  08/09/16   1543  07/12/16   0902  01/10/09   0548   EBRES  EBV DNA Not Detected  EBV DNA Not Detected  EBV DNA Not Detected  <500  EBV DNA Detected below the reportable range of 500 Copies/mL  *  861*  1119*  48419*  50875*  167156*  <1000   EBSPEC   --    --    --    --    --    --    --    --    --   Whole blood, EDTA anticoagulant     EBV DNA Copies/mL   Date Value Ref Range Status   03/20/2017 EBV DNA Not Detected EBVNEG [Copies]/mL Final   03/16/2017 EBV DNA Not Detected EBVNEG [Copies]/mL Final   02/03/2017 EBV DNA Not Detected EBVNEG [Copies]/mL Final   01/19/2017 (A) EBVNEG [Copies]/mL Final    <500  EBV DNA Detected below the reportable range of 500 Copies/mL     01/05/2017 861 (A) EBVNEG [Copies]/mL Final   10/11/2016 1119 (A) EBVNEG [Copies]/mL Final   09/20/2016 32721 (A) EBVNEG [Copies]/mL Final   08/09/2016 79956 (A) EBVNEG [Copies]/mL Final   07/12/2016 205574 (A) EBVNEG [Copies]/mL Final   01/10/2009 <1000 <1000 Copies/mL Final     Respiratory Virus Testing    RSV Rapid Antigen Result   Date Value Ref Range Status   03/26/2009 Negative NEG Final   01/08/2009 Negative NEG Final   11/20/2008 Negative NEG Final     Sputum Culture last 3 months:  Specimen Description   Date Value Ref Range Status   03/25/2017 Blood Right Arm  Final   03/24/2017 Nares  Final   03/21/2017 Bronchoalveolar Lavage  Final   03/21/2017 Bronchoalveolar Lavage  Final   03/21/2017 Bronchoalveolar Lavage  Final   03/21/2017 Bronchoalveolar Lavage  Final   03/21/2017 Bronchoalveolar Lavage  Final   03/21/2017 Bronchoalveolar  Lavage  Final   03/21/2017 Bronchoalveolar Lavage  Final   03/21/2017 Bronchoalveolar Lavage  Final    Culture Micro   Date Value Ref Range Status   03/25/2017 No growth after 2 hours  Final   03/21/2017   Final    Culture received and in progress.  Positive AFB results are called as soon as   detected.  Final report to follow in 7 to 8 weeks.  Assayed at [facility],[employer],[place] [address]     03/21/2017   Final    Test canceled by PCU/Clinic CANCELED PER    03/21/2017 Culture negative after 3 days  Final   03/21/2017 Culture negative monitoring continues  Final   03/21/2017 No growth after 3 days  Final   03/21/2017 (A)  Final    Light growth Normal norberto  Light growth Escherichia coli isolate did not exhibit characteristics for ESBL   isolate is not an ESBL  Single colony Filamentous fungus isolated Referred to mycology for identification             declines

## 2022-06-29 NOTE — PHYSICAL THERAPY INITIAL EVALUATION ADULT - PERTINENT HX OF CURRENT PROBLEM, REHAB EVAL
PMHx: HLD, chronic back pain. present to [facility] for headache. Pt s/p COVID nasal swab L nare prior to traveling to [place] on 5/28. Thereafter developed persistent clear rhinorrhea, associated with fever, headache, cough & generalized weakness. 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 Pt has since completed 1wk course of augmentin. [place] returned to [place] 4days ago, Neurologist sent pt to hospital today for further eval. cont...

## 2022-06-29 NOTE — CONSULT NOTE ADULT - SUBJECTIVE AND OBJECTIVE BOX
CC: csf leak     HPI: 46F p/w CSF leak L ethmoid sinus s/p covid swab on 5/28 at [facility], over past several wks while traveling w/ increasing flow of rhinorrhea, eventually developed sxs cold and meningitis (HA/nausea/cough/gen weakness), adm [facility] on 6/20 febrile to 102, CSF PCR pos enterovirus, ID kept on cefepime. PT c/o salty taste. Pt denies h/a, recent URI, congestion, facial pain, facial tenderness, or changes in vision     PAST MEDICAL & SURGICAL HISTORY:  HLD (hyperlipidemia)      Chronic back pain        Allergies    No Known Allergies    Intolerances      MEDICATIONS  (STANDING):  atorvastatin 40 milliGRAM(s) Oral at bedtime  cefepime   IVPB 2000 milliGRAM(s) IV Intermittent every 8 hours  doxycycline monohydrate Capsule 100 milliGRAM(s) Oral every 12 hours  polyethylene glycol 3350 17 Gram(s) Oral two times a day  senna 2 Tablet(s) Oral at bedtime  sodium chloride 0.9%. 1000 milliLiter(s) (75 mL/Hr) IV Continuous <Continuous>  topiramate 25 milliGRAM(s) Oral daily    MEDICATIONS  (PRN):  acetaminophen     Tablet .. 650 milliGRAM(s) Oral every 6 hours PRN Temp greater or equal to 38C (100.4F), Mild Pain (1 - 3)  cyclobenzaprine 10 milliGRAM(s) Oral three times a day PRN Muscle Spasm  melatonin 5 milliGRAM(s) Oral at bedtime PRN Insomnia  oxyCODONE    IR 5 milliGRAM(s) Oral every 4 hours PRN Moderate Pain (4 - 6)  oxyCODONE    IR 10 milliGRAM(s) Oral every 4 hours PRN Severe Pain (7 - 10)      Social History: no tobacco, no etoh     Family history: Pt denies any sign FHx    ROS:   ENT: all negative except as noted in HPI   CV: denies palpitations  Pulm: denies SOB, cough, hemoptysis  GI: denies change in apetite, indigestion, n/v  : denies pertinent urinary symptoms, urgency  Neuro: denies numbness/tingling, loss of sensation  Psych: denies anxiety  MS: denies muscle weakness, instability  Heme: denies easy bruising or bleeding  Endo: denies heat/cold intolerance, excessive sweating  Vascular: denies LE edema    Vital Signs Last 24 Hrs  T(C): 36.7 (29 Jun 2022 05:12), Max: 36.9 (28 Jun 2022 08:00)  T(F): 98.1 (29 Jun 2022 05:12), Max: 98.5 (28 Jun 2022 08:00)  HR: 71 (29 Jun 2022 05:12) (71 - 99)  BP: 127/79 (29 Jun 2022 05:12) (108/73 - 131/82)  BP(mean): 88 (28 Jun 2022 16:13) (88 - 88)  RR: 18 (29 Jun 2022 05:12) (18 - 19)  SpO2: 98% (29 Jun 2022 05:12) (94% - 98%)                          12.7   8.73  )-----------( 437      ( 28 Jun 2022 23:05 )             40.2    06-28    140  |  104  |  6<L>  ----------------------------<  94  3.8   |  22  |  0.68    Ca    9.5      28 Jun 2022 23:05  Phos  3.2     06-28  Mg     2.0     06-28    TPro  7.9  /  Alb  4.0  /  TBili  0.4  /  DBili  x   /  AST  10  /  ALT  7<L>  /  AlkPhos  77  06-28   PT/INR - ( 28 Jun 2022 23:05 )   PT: 13.9 sec;   INR: 1.21 ratio         PTT - ( 28 Jun 2022 23:05 )  PTT:32.4 sec    PHYSICAL EXAM:  Gen: NAD  Skin: No rashes, bruises, or lesions  Head: Normocephalic, Atraumatic  Face: no edema, erythema, or fluctuance. Parotid glands soft without mass  Eyes: no scleral injection  Nose: Nares bilaterally patent, + clear d/c on mustache dressing   Mouth: No Stridor / Drooling / Trismus.  Mucosa moist, tongue/uvula midline, oropharynx clear  Neck: Flat, supple, no lymphadenopathy, trachea midline, no masses  Lymphatic: No lymphadenopathy  Resp: breathing easily, no stridor  CV: no peripheral edema/cyanosis  GI: nondistended   Peripheral vascular: no JVD or edema  Neuro: facial nerve intact, no facial droop          IMAGING/ADDITIONAL STUDIES: < from: CT Maxillofacial No Cont (06.25.22 @ 19:27) >    IMPRESSION: Thinning of the cribriform plate with fluid in the left   ethmoid and maxillary sinus likely related to CSF leak, best seen on   coronal series 6 image 30.    < end of copied text >

## 2022-06-29 NOTE — PHYSICAL THERAPY INITIAL EVALUATION ADULT - ASR WT BEARING STATUS EVAL
no weight-bearing restrictions Pt with +CSF leak from nose & into throat, salty taste, +enterovirus in CSF. transferred to [facility] 6/28 for joint surgery with ENT/NSGY. s/p Endoscopic endonasal repair of CSF leak with lumbar drain 6/29./no weight-bearing restrictions

## 2022-06-29 NOTE — PHYSICAL THERAPY INITIAL EVALUATION ADULT - GENERAL OBSERVATIONS, REHAB EVAL
Pt rec'd semi-supine in bed in NAD, VSS, +ICU monitoring, +Lumbar drain, IV, agreeable to PT session

## 2022-06-29 NOTE — PROGRESS NOTE ADULT - SUBJECTIVE AND OBJECTIVE BOX
ENT ISSUE/POD: S/P CSF Leak Repair POD # 0.     HPI: 47 YO F with CSF leak from L ethmoid sinus S/P  covid swab on 5/28 at [facility], over past several wks while traveling w/ increasing flow of rhinorrhea, eventually developed sxs cold and meningitis (HA/nausea/cough/gen weakness), adm [facility] on 6/20 febrile to 102, CSF PCR pos enterovirus, ID kept on cefepime. PT c/o salty taste. S/P Endonasal CSF Leak repair today. CSF leak in OR, repaired and LD placed @10cc/hr. Pt was evaluated at bedside. C/o frontal HA, 8/10 pain, relived with pain meds.  Denies salty/Metallic taste in mouth, fever/chills,  cough, N/V, facial pain, changes in vision.   PAST MEDICAL & SURGICAL HISTORY:  HLD (hyperlipidemia)  Chronic back pain    Allergies  No Known Allergies    Intolerances    MEDICATIONS  (STANDING):  acetaminophen   IVPB .. 1000 milliGRAM(s) IV Intermittent once  amoxicillin  875 milliGRAM(s)/clavulanate 1 Tablet(s) Oral two times a day  atorvastatin 40 milliGRAM(s) Oral at bedtime  cefepime   IVPB 2000 milliGRAM(s) IV Intermittent every 8 hours  doxycycline monohydrate Capsule 100 milliGRAM(s) Oral every 12 hours  polyethylene glycol 3350 17 Gram(s) Oral daily  senna 1 Tablet(s) Oral two times a day  sodium chloride 0.65% Nasal 1 Spray(s) Both Nostrils three times a day  sodium chloride 0.9%. 1000 milliLiter(s) (75 mL/Hr) IV Continuous <Continuous>  topiramate 25 milliGRAM(s) Oral daily    MEDICATIONS  (PRN):  acetaminophen     Tablet .. 650 milliGRAM(s) Oral every 6 hours PRN Temp greater or equal to 38C (100.4F), Mild Pain (1 - 3)  bisacodyl Suppository 10 milliGRAM(s) Rectal daily PRN Constipation  cyclobenzaprine 10 milliGRAM(s) Oral three times a day PRN Muscle Spasm  HYDROmorphone  Injectable 1 milliGRAM(s) IV Push every 4 hours PRN Breakthrough pain  melatonin 5 milliGRAM(s) Oral at bedtime PRN Insomnia  ondansetron Injectable 4 milliGRAM(s) IV Push once PRN Nausea and/or Vomiting  oxyCODONE    IR 5 milliGRAM(s) Oral every 4 hours PRN Moderate Pain (4 - 6)  oxyCODONE    IR 10 milliGRAM(s) Oral every 4 hours PRN Severe Pain (7 - 10)  saline laxative (FLEET) Rectal Enema 1 Enema Rectal once PRN constipation    Social History: SEE CONSULT.   Family history: SEE CONSULT.     ROS:   ENT: all negative except as noted in HPI   Pulm: denies SOB, cough, hemoptysis  Neuro: denies numbness/tingling, loss of sensation  Endo: denies heat/cold intolerance, excessive sweating    Vital Signs Last 24 Hrs  T(C): 36.7 (29 Jun 2022 19:00), Max: 36.8 (29 Jun 2022 18:00)  T(F): 98.1 (29 Jun 2022 19:00), Max: 98.3 (29 Jun 2022 18:00)  HR: 94 (29 Jun 2022 23:00) (71 - 94)  BP: 134/80 (29 Jun 2022 23:00) (122/98 - 147/88)  BP(mean): 94 (29 Jun 2022 23:00) (88 - 105)  RR: 14 (29 Jun 2022 23:00) (12 - 23)  SpO2: 94% (29 Jun 2022 23:00) (94% - 100%)                          12.2   16.72 )-----------( 396      ( 29 Jun 2022 22:36 )             38.6    06-29    138  |  104  |  6<L>  ----------------------------<  123<H>  4.1   |  23  |  0.64    Ca    8.9      29 Jun 2022 22:36  Phos  3.5     06-29  Mg     1.9     06-29  TPro  7.9  /  Alb  4.0  /  TBili  0.4  /  DBili  x   /  AST  10  /  ALT  7<L>  /  AlkPhos  77  06-28   PT/INR - ( 28 Jun 2022 23:05 )   PT: 13.9 sec;   INR: 1.21 ratio     PTT - ( 28 Jun 2022 23:05 )  PTT:32.4 sec    PHYSICAL EXAM:  Gen: NAD, On Humidified O2.   Skin: No rashes, bruises, or lesions  Head: Normocephalic, Atraumatic  Face: no edema, erythema, or fluctuance. Parotid glands soft without mass  Eyes: no scleral injection  Nose: Crusted blood in Right Nare, blood tinged secretions in Left Nare.  No CSF leak noted on exam.   Mouth: No Stridor / Drooling / Trismus.  Mucosa moist, tongue/uvula midline, crusted blood in posterior oropharynx.  Neck: Flat, supple, no lymphadenopathy, trachea midline, no masses  Lymphatic: No lymphadenopathy  Resp: On Humidified O2.   Neuro: facial nerve intact, no facial droop.  ENT ISSUE/POD: S/P CSF Leak Repair POD # 0.     HPI: 47 YO F with CSF leak from L ethmoid sinus S/P  covid swab on 5/28 [facility], over past several wks while traveling w/ increasing flow of rhinorrhea, eventually developed sxs cold and meningitis (HA/nausea/cough/gen weakness), adm [facility] on 6/20 febrile to 102, CSF PCR pos enterovirus, ID kept on cefepime. PT c/o salty taste. S/P Endonasal CSF Leak repair today. CSF leak in OR, repaired and LD placed @10cc/hr. Pt was evaluated at bedside. C/o frontal HA, 8/10 pain, relived with pain meds.  Denies salty/Metallic taste in mouth, fever/chills,  cough, N/V, facial pain, changes in vision.     PAST MEDICAL & SURGICAL HISTORY:  HLD (hyperlipidemia)  Chronic back pain    Allergies  No Known Allergies    Intolerances    MEDICATIONS  (STANDING):  acetaminophen   IVPB .. 1000 milliGRAM(s) IV Intermittent once  amoxicillin  875 milliGRAM(s)/clavulanate 1 Tablet(s) Oral two times a day  atorvastatin 40 milliGRAM(s) Oral at bedtime  cefepime   IVPB 2000 milliGRAM(s) IV Intermittent every 8 hours  doxycycline monohydrate Capsule 100 milliGRAM(s) Oral every 12 hours  polyethylene glycol 3350 17 Gram(s) Oral daily  senna 1 Tablet(s) Oral two times a day  sodium chloride 0.65% Nasal 1 Spray(s) Both Nostrils three times a day  sodium chloride 0.9%. 1000 milliLiter(s) (75 mL/Hr) IV Continuous <Continuous>  topiramate 25 milliGRAM(s) Oral daily    MEDICATIONS  (PRN):  acetaminophen     Tablet .. 650 milliGRAM(s) Oral every 6 hours PRN Temp greater or equal to 38C (100.4F), Mild Pain (1 - 3)  bisacodyl Suppository 10 milliGRAM(s) Rectal daily PRN Constipation  cyclobenzaprine 10 milliGRAM(s) Oral three times a day PRN Muscle Spasm  HYDROmorphone  Injectable 1 milliGRAM(s) IV Push every 4 hours PRN Breakthrough pain  melatonin 5 milliGRAM(s) Oral at bedtime PRN Insomnia  ondansetron Injectable 4 milliGRAM(s) IV Push once PRN Nausea and/or Vomiting  oxyCODONE    IR 5 milliGRAM(s) Oral every 4 hours PRN Moderate Pain (4 - 6)  oxyCODONE    IR 10 milliGRAM(s) Oral every 4 hours PRN Severe Pain (7 - 10)  saline laxative (FLEET) Rectal Enema 1 Enema Rectal once PRN constipation    Social History: SEE CONSULT.   Family history: SEE CONSULT.     ROS:   ENT: all negative except as noted in HPI   Pulm: denies SOB, cough, hemoptysis  Neuro: denies numbness/tingling, loss of sensation  Endo: denies heat/cold intolerance, excessive sweating    Vital Signs Last 24 Hrs  T(C): 36.7 (29 Jun 2022 19:00), Max: 36.8 (29 Jun 2022 18:00)  T(F): 98.1 (29 Jun 2022 19:00), Max: 98.3 (29 Jun 2022 18:00)  HR: 94 (29 Jun 2022 23:00) (71 - 94)  BP: 134/80 (29 Jun 2022 23:00) (122/98 - 147/88)  BP(mean): 94 (29 Jun 2022 23:00) (88 - 105)  RR: 14 (29 Jun 2022 23:00) (12 - 23)  SpO2: 94% (29 Jun 2022 23:00) (94% - 100%)                          12.2   16.72 )-----------( 396      ( 29 Jun 2022 22:36 )             38.6    06-29    138  |  104  |  6<L>  ----------------------------<  123<H>  4.1   |  23  |  0.64    Ca    8.9      29 Jun 2022 22:36  Phos  3.5     06-29  Mg     1.9     06-29  TPro  7.9  /  Alb  4.0  /  TBili  0.4  /  DBili  x   /  AST  10  /  ALT  7<L>  /  AlkPhos  77  06-28   PT/INR - ( 28 Jun 2022 23:05 )   PT: 13.9 sec;   INR: 1.21 ratio     PTT - ( 28 Jun 2022 23:05 )  PTT:32.4 sec    PHYSICAL EXAM:  Gen: NAD, On Humidified O2.   Skin: No rashes, bruises, or lesions  Head: Normocephalic, Atraumatic  Face: no edema, erythema, or fluctuance. Parotid glands soft without mass  Eyes: no scleral injection  Nose: Crusted blood in Right Nare, blood tinged secretions in Left Nare.  No CSF leak noted on exam.   Mouth: No Stridor / Drooling / Trismus.  Mucosa moist, tongue/uvula midline, crusted blood in posterior oropharynx.  Neck: Flat, supple, no lymphadenopathy, trachea midline, no masses  Lymphatic: No lymphadenopathy  Resp: On Humidified O2.   Neuro: facial nerve intact, no facial droop.

## 2022-06-29 NOTE — CONSULT NOTE ADULT - ASSESSMENT
46y w CSF leak s/p covid swap 5/28 developed meningitis, transferred from [facility] for repair. 
46F with PMH of HLD p/w CSF leak L ethmoid sinus s/p covid swab on 5/28, course complicated by cold and meningitis (HA/nausea/cough/gen weakness), adm [facility] found to have CSF PCR pos enterovirus, transferred to [facility] for CSF leak repair.

## 2022-06-29 NOTE — PATIENT PROFILE ADULT - HAVE YOU EXPERIENCED VIOLENCE OR A TRAUMATIC EVENT?
Pt came in today for bp check. Pt bp was 122/72. Pt didn't take her medication last night. She takes Losartan 25 mg at night and metoprolol succinate 25 mg in morning. Pt also stated that she didn't take her medication Friday night either.  
no

## 2022-06-29 NOTE — PROGRESS NOTE ADULT - SUBJECTIVE AND OBJECTIVE BOX
SUBJECTIVE:     Vital Signs Last 24 Hrs  T(C): 36.8 (06-29-22 @ 18:00), Max: 36.8 (06-28-22 @ 20:50)  T(F): 98.3 (06-29-22 @ 18:00), Max: 98.3 (06-28-22 @ 20:50)  HR: 81 (06-29-22 @ 18:30) (71 - 99)  BP: 133/76 (06-29-22 @ 18:30) (108/73 - 147/88)  BP(mean): 94 (06-29-22 @ 18:30) (94 - 101)  RR: 18 (06-29-22 @ 18:30) (12 - 20)  SpO2: 96% (06-29-22 @ 18:30) (94% - 100%)    PHYSICAL EXAM:    Constitutional: No Acute Distress     Neurological: Awake, alert, oriented to person, place and time, speech clear and fluent, face equal, tongue midline, briskly following commands, no drift, moves all extremities with 5/5 strength, sensation intact to light touch throughout, pupils 4mm and reactive bilaterally, extraocular movements intact, no nystagmus    Incision:     Drains:     Pulmonary: Clear to Auscultation, No rales, No rhonchi, No wheezes     Cardiovascular: S1, S2, Regular rate and rhythm     Gastrointestinal: Soft, Non-tender, Non-distended, +bowel sounds x 4    Extremities: No calf tenderness bilaterally, no cyanosis, clubbing or edema          LABS:                          12.7   8.73  )-----------( 437      ( 28 Jun 2022 23:05 )             40.2    06-28    140  |  104  |  6<L>  ----------------------------<  94  3.8   |  22  |  0.68    Ca    9.5      28 Jun 2022 23:05  Phos  3.2     06-28  Mg     2.0     06-28    TPro  7.9  /  Alb  4.0  /  TBili  0.4  /  DBili  x   /  AST  10  /  ALT  7<L>  /  AlkPhos  77  06-28  PT/INR - ( 28 Jun 2022 23:05 )   PT: 13.9 sec;   INR: 1.21 ratio         PTT - ( 28 Jun 2022 23:05 )  PTT:32.4 sec    06-29 @ 07:01  - 06-29 @ 18:44  --------------------------------------------------------  IN: 250 mL / OUT: 30 mL / NET: 220 mL        IMAGING:     MEDICATIONS:  Antibiotics:  cefepime   IVPB 2000 milliGRAM(s) IV Intermittent every 8 hours  doxycycline monohydrate Capsule 100 milliGRAM(s) Oral every 12 hours    Neuro:  acetaminophen     Tablet .. 650 milliGRAM(s) Oral every 6 hours PRN Temp greater or equal to 38C (100.4F), Mild Pain (1 - 3)  cyclobenzaprine 10 milliGRAM(s) Oral three times a day PRN Muscle Spasm  melatonin 5 milliGRAM(s) Oral at bedtime PRN Insomnia  oxyCODONE    IR 5 milliGRAM(s) Oral every 4 hours PRN Moderate Pain (4 - 6)  oxyCODONE    IR 10 milliGRAM(s) Oral every 4 hours PRN Severe Pain (7 - 10)  topiramate 25 milliGRAM(s) Oral daily    Cardiac:    Pulm:    GI/:    Other:   atorvastatin 40 milliGRAM(s) Oral at bedtime  sodium chloride 0.9%. 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     EVENTS: Seen in [facility] complaining of pain, had mild rhinorrhea.    Vital Signs Last 24 Hrs  T(C): 36.8 (06-29-22 @ 18:00), Max: 36.8 (06-28-22 @ 20:50)  T(F): 98.3 (06-29-22 @ 18:00), Max: 98.3 (06-28-22 @ 20:50)  HR: 81 (06-29-22 @ 18:30) (71 - 99)  BP: 133/76 (06-29-22 @ 18:30) (108/73 - 147/88)  BP(mean): 94 (06-29-22 @ 18:30) (94 - 101)  RR: 18 (06-29-22 @ 18:30) (12 - 20)  SpO2: 96% (06-29-22 @ 18:30) (94% - 100%)    PHYSICAL EXAM:    Constitutional: No Acute Distress     Neurological: Awake, alert, oriented to person, place and time, speech clear and fluent, face equal, tongue midline, briskly following commands, no drift, moves all extremities with 5/5 strength, sensation intact to light touch throughout, pupils 4mm and reactive bilaterally, extraocular movements intact, no nystagmus    Incision:     Drains:     Pulmonary: Clear to Auscultation, No rales, No rhonchi, No wheezes     Cardiovascular: S1, S2, Regular rate and rhythm     Gastrointestinal: Soft, Non-tender, Non-distended, +bowel sounds x 4    Extremities: No calf tenderness bilaterally, no cyanosis, clubbing or edema          LABS:                          12.7   8.73  )-----------( 437      ( 28 Jun 2022 23:05 )             40.2    06-28    140  |  104  |  6<L>  ----------------------------<  94  3.8   |  22  |  0.68    Ca    9.5      28 Jun 2022 23:05  Phos  3.2     06-28  Mg     2.0     06-28    TPro  7.9  /  Alb  4.0  /  TBili  0.4  /  DBili  x   /  AST  10  /  ALT  7<L>  /  AlkPhos  77  06-28  PT/INR - ( 28 Jun 2022 23:05 )   PT: 13.9 sec;   INR: 1.21 ratio         PTT - ( 28 Jun 2022 23:05 )  PTT:32.4 sec    06-29 @ 07:01  -  06-29 @ 18:44  --------------------------------------------------------  IN: 250 mL / OUT: 30 mL / NET: 220 mL        IMAGING:     MEDICATIONS:  Antibiotics:  cefepime   IVPB 2000 milliGRAM(s) IV Intermittent every 8 hours  doxycycline monohydrate Capsule 100 milliGRAM(s) Oral every 12 hours    Neuro:  acetaminophen     Tablet .. 650 milliGRAM(s) Oral every 6 hours PRN Temp greater or equal to 38C (100.4F), Mild Pain (1 - 3)  cyclobenzaprine 10 milliGRAM(s) Oral three times a day PRN Muscle Spasm  melatonin 5 milliGRAM(s) Oral at bedtime PRN Insomnia  oxyCODONE    IR 5 milliGRAM(s) Oral every 4 hours PRN Moderate Pain (4 - 6)  oxyCODONE    IR 10 milliGRAM(s) Oral every 4 hours PRN Severe Pain (7 - 10)  topiramate 25 milliGRAM(s) Oral daily    Cardiac:    Pulm:    GI/:    Other:   atorvastatin 40 milliGRAM(s) Oral at bedtime  sodium chloride 0.9%. 1000 milliLiter(s) IV Continuous <Continuous>        DIET:

## 2022-06-29 NOTE — PHYSICAL THERAPY INITIAL EVALUATION ADULT - PLANNED THERAPY INTERVENTIONS, PT EVAL
GOAL: Pt will ascend/descend (12) steps (I) with U HR and step over step pattern in 4 weeks./balance training/bed mobility training/gait training/transfer training

## 2022-06-30 PROCEDURE — 99291 CRITICAL CARE FIRST HOUR: CPT

## 2022-06-30 RX ORDER — TRAMADOL HYDROCHLORIDE 50 MG/1
50 TABLET ORAL EVERY 6 HOURS
Refills: 0 | Status: DISCONTINUED | OUTPATIENT
Start: 2022-06-30 | End: 2022-07-05

## 2022-06-30 RX ORDER — ENOXAPARIN SODIUM 100 MG/ML
40 INJECTION SUBCUTANEOUS
Refills: 0 | Status: DISCONTINUED | OUTPATIENT
Start: 2022-06-30 | End: 2022-07-01

## 2022-06-30 RX ORDER — HYDROMORPHONE HYDROCHLORIDE 2 MG/ML
0.5 INJECTION INTRAMUSCULAR; INTRAVENOUS; SUBCUTANEOUS EVERY 4 HOURS
Refills: 0 | Status: DISCONTINUED | OUTPATIENT
Start: 2022-06-30 | End: 2022-07-05

## 2022-06-30 RX ORDER — CHLORHEXIDINE GLUCONATE 213 G/1000ML
1 SOLUTION TOPICAL DAILY
Refills: 0 | Status: DISCONTINUED | OUTPATIENT
Start: 2022-06-30 | End: 2022-07-05

## 2022-06-30 RX ORDER — TRAMADOL HYDROCHLORIDE 50 MG/1
25 TABLET ORAL ONCE
Refills: 0 | Status: DISCONTINUED | OUTPATIENT
Start: 2022-06-30 | End: 2022-06-30

## 2022-06-30 RX ADMIN — OXYCODONE HYDROCHLORIDE 10 MILLIGRAM(S): 5 TABLET ORAL at 05:00

## 2022-06-30 RX ADMIN — CEFEPIME 100 MILLIGRAM(S): 1 INJECTION, POWDER, FOR SOLUTION INTRAMUSCULAR; INTRAVENOUS at 13:19

## 2022-06-30 RX ADMIN — HYDROMORPHONE HYDROCHLORIDE 1 MILLIGRAM(S): 2 INJECTION INTRAMUSCULAR; INTRAVENOUS; SUBCUTANEOUS at 18:23

## 2022-06-30 RX ADMIN — HYDROMORPHONE HYDROCHLORIDE 1 MILLIGRAM(S): 2 INJECTION INTRAMUSCULAR; INTRAVENOUS; SUBCUTANEOUS at 14:23

## 2022-06-30 RX ADMIN — OXYCODONE HYDROCHLORIDE 10 MILLIGRAM(S): 5 TABLET ORAL at 12:00

## 2022-06-30 RX ADMIN — Medication 25 MILLIGRAM(S): at 12:06

## 2022-06-30 RX ADMIN — TRAMADOL HYDROCHLORIDE 25 MILLIGRAM(S): 50 TABLET ORAL at 14:00

## 2022-06-30 RX ADMIN — OXYCODONE HYDROCHLORIDE 10 MILLIGRAM(S): 5 TABLET ORAL at 13:00

## 2022-06-30 RX ADMIN — CYCLOBENZAPRINE HYDROCHLORIDE 10 MILLIGRAM(S): 10 TABLET, FILM COATED ORAL at 15:14

## 2022-06-30 RX ADMIN — HYDROMORPHONE HYDROCHLORIDE 1 MILLIGRAM(S): 2 INJECTION INTRAMUSCULAR; INTRAVENOUS; SUBCUTANEOUS at 02:00

## 2022-06-30 RX ADMIN — HYDROMORPHONE HYDROCHLORIDE 1 MILLIGRAM(S): 2 INJECTION INTRAMUSCULAR; INTRAVENOUS; SUBCUTANEOUS at 02:30

## 2022-06-30 RX ADMIN — HYDROMORPHONE HYDROCHLORIDE 1 MILLIGRAM(S): 2 INJECTION INTRAMUSCULAR; INTRAVENOUS; SUBCUTANEOUS at 11:00

## 2022-06-30 RX ADMIN — OXYCODONE HYDROCHLORIDE 5 MILLIGRAM(S): 5 TABLET ORAL at 00:42

## 2022-06-30 RX ADMIN — Medication 400 MILLIGRAM(S): at 00:41

## 2022-06-30 RX ADMIN — HYDROMORPHONE HYDROCHLORIDE 1 MILLIGRAM(S): 2 INJECTION INTRAMUSCULAR; INTRAVENOUS; SUBCUTANEOUS at 10:15

## 2022-06-30 RX ADMIN — OXYCODONE HYDROCHLORIDE 5 MILLIGRAM(S): 5 TABLET ORAL at 06:15

## 2022-06-30 RX ADMIN — OXYCODONE HYDROCHLORIDE 10 MILLIGRAM(S): 5 TABLET ORAL at 20:15

## 2022-06-30 RX ADMIN — Medication 1 SPRAY(S): at 22:24

## 2022-06-30 RX ADMIN — POLYETHYLENE GLYCOL 3350 17 GRAM(S): 17 POWDER, FOR SOLUTION ORAL at 12:06

## 2022-06-30 RX ADMIN — TRAMADOL HYDROCHLORIDE 25 MILLIGRAM(S): 50 TABLET ORAL at 13:19

## 2022-06-30 RX ADMIN — HYDROMORPHONE HYDROCHLORIDE 1 MILLIGRAM(S): 2 INJECTION INTRAMUSCULAR; INTRAVENOUS; SUBCUTANEOUS at 19:00

## 2022-06-30 RX ADMIN — OXYCODONE HYDROCHLORIDE 10 MILLIGRAM(S): 5 TABLET ORAL at 16:11

## 2022-06-30 RX ADMIN — OXYCODONE HYDROCHLORIDE 10 MILLIGRAM(S): 5 TABLET ORAL at 20:45

## 2022-06-30 RX ADMIN — Medication 1 SPRAY(S): at 15:17

## 2022-06-30 RX ADMIN — OXYCODONE HYDROCHLORIDE 10 MILLIGRAM(S): 5 TABLET ORAL at 08:00

## 2022-06-30 RX ADMIN — SODIUM CHLORIDE 75 MILLILITER(S): 9 INJECTION INTRAMUSCULAR; INTRAVENOUS; SUBCUTANEOUS at 20:16

## 2022-06-30 RX ADMIN — SENNA PLUS 1 TABLET(S): 8.6 TABLET ORAL at 05:10

## 2022-06-30 RX ADMIN — Medication 1 SPRAY(S): at 05:10

## 2022-06-30 RX ADMIN — Medication 100 MILLIGRAM(S): at 05:10

## 2022-06-30 RX ADMIN — SENNA PLUS 1 TABLET(S): 8.6 TABLET ORAL at 18:23

## 2022-06-30 RX ADMIN — OXYCODONE HYDROCHLORIDE 5 MILLIGRAM(S): 5 TABLET ORAL at 05:15

## 2022-06-30 RX ADMIN — CYCLOBENZAPRINE HYDROCHLORIDE 10 MILLIGRAM(S): 10 TABLET, FILM COATED ORAL at 07:15

## 2022-06-30 RX ADMIN — HYDROMORPHONE HYDROCHLORIDE 1 MILLIGRAM(S): 2 INJECTION INTRAMUSCULAR; INTRAVENOUS; SUBCUTANEOUS at 06:45

## 2022-06-30 RX ADMIN — HYDROMORPHONE HYDROCHLORIDE 1 MILLIGRAM(S): 2 INJECTION INTRAMUSCULAR; INTRAVENOUS; SUBCUTANEOUS at 15:00

## 2022-06-30 RX ADMIN — Medication 1000 MILLIGRAM(S): at 01:12

## 2022-06-30 RX ADMIN — OXYCODONE HYDROCHLORIDE 10 MILLIGRAM(S): 5 TABLET ORAL at 04:00

## 2022-06-30 RX ADMIN — HYDROMORPHONE HYDROCHLORIDE 1 MILLIGRAM(S): 2 INJECTION INTRAMUSCULAR; INTRAVENOUS; SUBCUTANEOUS at 06:15

## 2022-06-30 RX ADMIN — Medication 100 MILLIGRAM(S): at 18:22

## 2022-06-30 RX ADMIN — CEFEPIME 100 MILLIGRAM(S): 1 INJECTION, POWDER, FOR SOLUTION INTRAMUSCULAR; INTRAVENOUS at 05:03

## 2022-06-30 RX ADMIN — OXYCODONE HYDROCHLORIDE 10 MILLIGRAM(S): 5 TABLET ORAL at 09:00

## 2022-06-30 RX ADMIN — OXYCODONE HYDROCHLORIDE 10 MILLIGRAM(S): 5 TABLET ORAL at 17:00

## 2022-06-30 RX ADMIN — ENOXAPARIN SODIUM 40 MILLIGRAM(S): 100 INJECTION SUBCUTANEOUS at 18:22

## 2022-06-30 RX ADMIN — Medication 1 TABLET(S): at 05:04

## 2022-06-30 RX ADMIN — ATORVASTATIN CALCIUM 40 MILLIGRAM(S): 80 TABLET, FILM COATED ORAL at 20:16

## 2022-06-30 RX ADMIN — OXYCODONE HYDROCHLORIDE 5 MILLIGRAM(S): 5 TABLET ORAL at 01:42

## 2022-06-30 NOTE — PROVIDER CONTACT NOTE (OTHER) - ACTION/TREATMENT ORDERED:
FRANTZ Rodriguez and MD Ladonna Harvey at bedside assessing pt. Moustache dressing shows bloody output, no s/s of CSF. Continue to monitor pt. and drain lumbar drain 10cc/hr.

## 2022-06-30 NOTE — PROGRESS NOTE ADULT - SUBJECTIVE AND OBJECTIVE BOX
INTERVAL HISTORY: HPI:  46F p/w CSF leak L ethmoid sinus s/p covid swab on 5/28, over past several wks while traveling w/ increaing flow of rhinorrhea, eventually developed sxs cold and meningitis (HA/nausea/cough/gen weakness), adm [facility] on 6/20 febrile to 102, CSF PCR pos enterovirus, ID kept on cefepime. Exam: +rhinorrhea from L nare, otherwise intact, no HA, AVSS.  (28 Jun 2022 23:02)    EVENTS: LD in place, no leak, exam stable    PHYSICAL EXAM:  General: No Acute Distress   Neurological: Awake, alert oriented to person, place and time, Following Commands, PERRL, EOMI, Face Symmetrical, Speech Fluent, Moving all extremities, Muscle Strength normal in all four extremities, No Drift, Sensation to Light Touch Intact, visual fields intact   Pulmonary: Clear to Auscultation, No Rales, No Rhonchi, No Wheezes   Cardiovascular: S1, S2, Regular Rate and Rhythm   Gastrointestinal: Soft, Nontender, Nondistended   Extremities: No calf tenderness       VITALS/MEDS/LABS/ORDERS/IMAGING: Reviewed INTERVAL HISTORY: HPI:  46F p/w CSF leak L ethmoid sinus s/p covid swab on 5/28, over past several wks while traveling w/ increaing flow of rhinorrhea, eventually developed sxs cold and meningitis (HA/nausea/cough/gen weakness), adm [facility] on 6/20 febrile to 102, CSF PCR pos enterovirus, ID kept on cefepime. Exam: +rhinorrhea from L nare, otherwise intact, no HA, AVSS.  (28 Jun 2022 23:02)    EVENTS: LD in place, no leak, exam stable, received oxy 10 for headaches and desatting to high 80s drowsy asleep, placed on NRB, emesis x2    PHYSICAL EXAM:  General: No Acute Distress   Neurological: Awake, alert oriented to person, place and time, Following Commands, PERRL, EOMI, Face Symmetrical, Speech Fluent, Moving all extremities, Muscle Strength normal in all four extremities, No Drift, Sensation to Light Touch Intact, visual fields intact   Pulmonary: Clear to Auscultation, No Rales, No Rhonchi, No Wheezes   Cardiovascular: S1, S2, Regular Rate and Rhythm   Gastrointestinal: Soft, Nontender, Nondistended   Extremities: No calf tenderness       VITALS/MEDS/LABS/ORDERS/IMAGING: Reviewed

## 2022-06-30 NOTE — PROGRESS NOTE ADULT - ASSESSMENT
Assessment:      NEURO:  -neuro check q1  -pain management w/ tylenol & oxycodon, tramadol   -Monitor lumbar drain 10cc/hr   -Pitutary precautions: i.e no straws, no bipap, no nasal swabs,   -PT evaluation placed  -c/w topamax     PULMONARY:  saturating well on RA,   -continue to monitor on pulse o2   -no bipap/cpap    CARDIOVASCULAR:  monitor on telemetry   vitals q1  sbp goal 100-160    GASTROENTEROLOGY:  regular   ensure BMs w/ Miralax & senna  GI ppx : Protonix 40mg qd  Daily stool count, LBM prior to arrival    RENAL/:  -check BMP qd  -strict i/o's ;     ENDOCRINE:  A1c- 5.5%    HEME/ONC:  DVT ppx: SQL   b/l SCDs  Obtain b/l LE screening dopplers    INFECTIOUS:   Monitor for fevers   c/w cefepime for meningitis   c/w doxy for thrombophlebitis   f/u ID

## 2022-06-30 NOTE — PROVIDER CONTACT NOTE (OTHER) - ASSESSMENT
Pt. post op, remains lethargic A&O x4. No drift on upper extremities, some effort on lower extremities. Lumbar drain in place, draining 10cc/hr with no issues. See assessment flowsheet for additional findings.

## 2022-06-30 NOTE — CHART NOTE - NSCHARTNOTEFT_GEN_A_CORE
CAPRINI SCORE [CLOT] Score on Admission for     AGE RELATED RISK FACTORS                                                       MOBILITY RELATED FACTORS  [X ] Age 41-60 years                                            (1 Point)                  [ ] Bed rest                                                        (1 Point)  [ ] Age: 61-74 years                                           (2 Points)                 [ ] Plaster cast                                                   (2 Points)  [ ] Age= 75 years                                              (3 Points)                 [ ] Bed bound for more than 72 hours                 (2 Points)    DISEASE RELATED RISK FACTORS                                               GENDER SPECIFIC FACTORS  [ ] Edema in the lower extremities                       (1 Point)                  [ ] Pregnancy                                                     (1 Point)  [ ] Varicose veins                                               (1 Point)                  [ ] Post-partum < 6 weeks                                   (1 Point)             [X ] BMI > 25 Kg/m2                                            (1 Point)                  [ ] Hormonal therapy  or oral contraception          (1 Point)                 [ ] Sepsis (in the previous month)                        (1 Point)                  [ ] History of pregnancy complications                 (1 point)  [ ] Pneumonia or serious lung disease                                               [ ] Unexplained or recurrent                     (1 Point)           (in the previous month)                               (1 Point)  [ ] Abnormal pulmonary function test                     (1 Point)                 SURGERY RELATED RISK FACTORS (include planned surgeries)  [ ] Acute myocardial infarction                              (1 Point)                 [ ]  Section                                             (1 Point)  [ ] Congestive heart failure (in the previous month)  (1 Point)         [ ] Minor surgery                                                  (1 Point)   [ ] Inflammatory bowel disease                             (1 Point)                 [ ] Arthroscopic surgery                                        (2 Points)  [ ] Central venous access                                      (2 Points)                [ X] General surgery lasting more than 45 minutes   (2 Points)       [ ] Stroke (in the previous month)                          (5 Points)               [ ] Elective arthroplasty                                         (5 Points)            [ ] current or past malignancy                              (2 Points)                                                                                                       HEMATOLOGY RELATED FACTORS                                                 TRAUMA RELATED RISK FACTORS  [ ] Prior episodes of VTE                                     (3 Points)                [ ] Fracture of the hip, pelvis, or leg                       (5 Points)  [ ] Positive family history for VTE                         (3 Points)                 [ ] Acute spinal cord injury (in the previous month)  (5 Points)  [ ] Prothrombin 66176 A                                     (3 Points)                 [ ] Paralysis  (less than 1 month)                             (5 Points)  [ ] Factor V Leiden                                             (3 Points)                  [ ] Multiple Trauma within 1 month                        (5 Points)  [ ] Lupus anticoagulants                                     (3 Points)                                                           [ ] Anticardiolipin antibodies                               (3 Points)                                                       [ ] High homocysteine in the blood                      (3 Points)                                             [ ] Other congenital or acquired thrombophilia      (3 Points)                                                [ ] Heparin induced thrombocytopenia                  (3 Points)                                          Total Score [     4     ]    Risk:  Very low 0   Low 1 to 2   Moderate 3 to 4   High =5       VTE Prophylasix Recommednations:  [X ] mechanical pneumatic compression devices                                      [ ] contraindicated: _____________________  [ ] chemo prophylasix                                                                                   [X ] contraindicated ________postop_____________    **** HIGH LIKELIHOOD DVT PRESENT ON ADMISSION  [ ] (please order LE dopplers within 24 hours of admission)

## 2022-06-30 NOTE — PROGRESS NOTE ADULT - SUBJECTIVE AND OBJECTIVE BOX
INTERVAL HISTORY: HPI:  46F p/w CSF leak L ethmoid sinus s/p covid swab on 5/28, over past several wks while traveling w/ increaing flow of rhinorrhea, eventually developed sxs cold and meningitis (HA/nausea/cough/gen weakness), adm [facility] on 6/20 febrile to 102, CSF PCR pos enterovirus, ID kept on cefepime. Exam: +rhinorrhea from L nare, otherwise intact, no HA, AVSS.  (28 Jun 2022 23:02)      MEDICATIONS  (STANDING):  atorvastatin 40 milliGRAM(s) Oral at bedtime  cefepime   IVPB 2000 milliGRAM(s) IV Intermittent every 8 hours  doxycycline monohydrate Capsule 100 milliGRAM(s) Oral every 12 hours  polyethylene glycol 3350 17 Gram(s) Oral daily  senna 1 Tablet(s) Oral two times a day  sodium chloride 0.65% Nasal 1 Spray(s) Both Nostrils three times a day  sodium chloride 0.9%. 1000 milliLiter(s) (75 mL/Hr) IV Continuous <Continuous>  topiramate 25 milliGRAM(s) Oral daily  traMADol 25 milliGRAM(s) Oral once    MEDICATIONS  (PRN):  acetaminophen     Tablet .. 650 milliGRAM(s) Oral every 6 hours PRN Temp greater or equal to 38C (100.4F), Mild Pain (1 - 3)  bisacodyl Suppository 10 milliGRAM(s) Rectal daily PRN Constipation  cyclobenzaprine 10 milliGRAM(s) Oral three times a day PRN Muscle Spasm  HYDROmorphone  Injectable 1 milliGRAM(s) IV Push every 4 hours PRN Breakthrough pain  melatonin 5 milliGRAM(s) Oral at bedtime PRN Insomnia  ondansetron Injectable 4 milliGRAM(s) IV Push once PRN Nausea and/or Vomiting  oxyCODONE    IR 5 milliGRAM(s) Oral every 4 hours PRN Moderate Pain (4 - 6)  oxyCODONE    IR 10 milliGRAM(s) Oral every 4 hours PRN Severe Pain (7 - 10)  saline laxative (FLEET) Rectal Enema 1 Enema Rectal once PRN constipation      Drug Dosing Weight    Weight (kg): 105.9 (20 Jun 2022 11:45)    PAST MEDICAL & SURGICAL HISTORY:  HLD (hyperlipidemia)  Chronic back pain    REVIEW OF SYSTEMS: [ ] Unable to Assess due to neurologic exam   [ ] All ROS addressed below are non-contributory, except:  Neuro: [x] Headache [ ] Back pain [ ] Numbness [ ] Weakness [ ] Ataxia [ ] Dizziness [ ] Aphasia [ ] Dysarthria [ ] Visual disturbance  Resp: [ ] Shortness of breath/dyspnea, [ ] Orthopnea [ ] Cough  CV: [ ] Chest pain [ ] Palpitation [ ] Lightheadedness [ ] Syncope  Renal: [ ] Thirst [ ] Edema  GI: [ ] Nausea [ ] Emesis [ ] Abdominal pain [ ] Constipation [ ] Diarrhea  Hem: [ ] Hematemesis [ ] bright red blood per rectum  ID: [ ] Fever [ ] Chills [ ] Dysuria  ENT: [ ] Rhinorrhea    PHYSICAL EXAM:  General: No Acute Distress   Neurological: Awake, alert oriented to person, place and time, Following Commands, PERRL, EOMI, Face Symmetrical, Speech Fluent, Moving all extremities, Muscle Strength normal in all four extremities, No Drift, Sensation to Light Touch Intact, visual fields intact   Pulmonary: Clear to Auscultation, No Rales, No Rhonchi, No Wheezes   Cardiovascular: S1, S2, Regular Rate and Rhythm   Gastrointestinal: Soft, Nontender, Nondistended   Extremities: No calf tenderness       ICU Vital Signs Last 24 Hrs  T(C): 36.8 (30 Jun 2022 07:00), Max: 36.8 (29 Jun 2022 18:00)  T(F): 98.2 (30 Jun 2022 07:00), Max: 98.3 (29 Jun 2022 18:00)  HR: 90 (30 Jun 2022 09:00) (74 - 94)  BP: 154/80 (30 Jun 2022 09:00) (114/67 - 154/80)  BP(mean): 102 (30 Jun 2022 09:00) (81 - 105)  RR: 20 (30 Jun 2022 09:00) (12 - 23)  SpO2: 100% (30 Jun 2022 09:00) (91% - 100%)      I&O's Detail    29 Jun 2022 07:01  -  30 Jun 2022 07:00  --------------------------------------------------------  IN:    IV PiggyBack: 300 mL    Oral Fluid: 300 mL    sodium chloride 0.9%: 1050 mL  Total IN: 1650 mL    OUT:    Drain (mL): 130 mL    Indwelling Catheter - Urethral (mL): 210 mL    Intermittent Catheterization - Urethral (mL): 300 mL  Total OUT: 640 mL    Total NET: 1010 mL      30 Jun 2022 07:01  -  30 Jun 2022 11:51  --------------------------------------------------------  IN:    Oral Fluid: 60 mL    sodium chloride 0.9%: 150 mL  Total IN: 210 mL    OUT:    Drain (mL): 20 mL  Total OUT: 20 mL    Total NET: 190 mL              LABS:  CBC Full  -  ( 29 Jun 2022 22:36 )  WBC Count : 16.72 K/uL  RBC Count : 4.38 M/uL  Hemoglobin : 12.2 g/dL  Hematocrit : 38.6 %  Platelet Count - Automated : 396 K/uL  Mean Cell Volume : 88.1 fl  Mean Cell Hemoglobin : 27.9 pg  Mean Cell Hemoglobin Concentration : 31.6 gm/dL  Auto Neutrophil # : x  Auto Lymphocyte # : x  Auto Monocyte # : x  Auto Eosinophil # : x  Auto Basophil # : x  Auto Neutrophil % : x  Auto Lymphocyte % : x  Auto Monocyte % : x  Auto Eosinophil % : x  Auto Basophil % : x    06-29    138  |  104  |  6<L>  ----------------------------<  123<H>  4.1   |  23  |  0.64    Ca    8.9      29 Jun 2022 22:36  Phos  3.5     06-29  Mg     1.9     06-29    TPro  7.9  /  Alb  4.0  /  TBili  0.4  /  DBili  x   /  AST  10  /  ALT  7<L>  /  AlkPhos  77  06-28    PT/INR - ( 28 Jun 2022 23:05 )   PT: 13.9 sec;   INR: 1.21 ratio         PTT - ( 28 Jun 2022 23:05 )  PTT:32.4 sec      RADIOLOGY & ADDITIONAL STUDIES:

## 2022-06-30 NOTE — PROGRESS NOTE ADULT - ASSESSMENT
Assessment:  45 YO F with CSF leak from L ethmoid sinus S/P Endonasal CSF Leak repair POD #1. CSF leak in OR, repaired and LD placed @10cc/hr.    NEURO:  -neuro check q1  -pain management w/ tylenol & oxycodon, tramadol   -Monitor lumbar drain 10cc/hr   -Pitutary precautions: i.e no straws, no bipap, no nasal swabs,   -PT evaluation placed  -c/w topamax     PULMONARY:  saturating well on RA,   -continue to monitor on pulse o2   -no bipap/cpap    CARDIOVASCULAR:  monitor on telemetry   vitals q1  sbp goal 100-160    GASTROENTEROLOGY:  regular   ensure BMs w/ Miralax & senna  GI ppx : Protonix 40mg qd  Daily stool count, LBM prior to arrival    RENAL/:  -check BMP qd  -strict i/o's ;     ENDOCRINE:  A1c- 5.5%    HEME/ONC:  DVT ppx: SQL   b/l SCDs  Obtain b/l LE screening dopplers    INFECTIOUS:   Monitor for fevers   c/w amoxicillin for 3 more days, discussed w ID  f/u ID    Assessment:  45 YO F with CSF leak from L ethmoid sinus S/P Endonasal CSF Leak repair POD #1. CSF leak in OR, repaired and LD placed @10cc/hr.    NEURO:  -neuro check q1  -pain management w/ tylenol & oxycodone 5, tramadol 50 prn severe pain  dilaudid 0.5 for breakthrough  -Monitor lumbar drain 10cc/hr   -Pitutary precautions: i.e no straws, no bipap, no nasal swabs,   -PT evaluation placed  -c/w topamax     PULMONARY: desatting while asleep after oxy, poss FERNANDO, keep sats >93%, face tent as needed  saturating well on RA,   -continue to monitor on pulse o2   -no bipap/cpap    CARDIOVASCULAR:  monitor on telemetry   vitals q1  sbp goal 100-160    GASTROENTEROLOGY:  regular   ensure BMs w/ Miralax & senna  GI ppx : Protonix 40mg qd  Daily stool count, LBM PTA    RENAL/:  -check BMP qd  -strict i/o's ;     ENDOCRINE:  A1c- 5.5%    HEME/ONC:  DVT ppx: SQL   b/l SCDs  Obtain b/l LE screening dopplers    INFECTIOUS:   Monitor for fevers   c/w amoxicillin for 3 more days, discussed w ID  f/u ID

## 2022-06-30 NOTE — PROGRESS NOTE ADULT - ASSESSMENT
45 YO F with CSF leak from L ethmoid sinus S/P Endonasal CSF Leak repair POD #1. CSF leak in OR, repaired and LD placed @10cc/hr. C/o frontal HA, 8/10 pain, relived with pain meds. No CSF Leak noted on exam.

## 2022-07-01 LAB
ANION GAP SERPL CALC-SCNC: 11 MMOL/L — SIGNIFICANT CHANGE UP (ref 5–17)
ANION GAP SERPL CALC-SCNC: 9 MMOL/L — SIGNIFICANT CHANGE UP (ref 5–17)
BUN SERPL-MCNC: 5 MG/DL — LOW (ref 7–23)
BUN SERPL-MCNC: 6 MG/DL — LOW (ref 7–23)
CALCIUM SERPL-MCNC: 8.9 MG/DL — SIGNIFICANT CHANGE UP (ref 8.4–10.5)
CALCIUM SERPL-MCNC: 9 MG/DL — SIGNIFICANT CHANGE UP (ref 8.4–10.5)
CHLORIDE SERPL-SCNC: 100 MMOL/L — SIGNIFICANT CHANGE UP (ref 96–108)
CHLORIDE SERPL-SCNC: 99 MMOL/L — SIGNIFICANT CHANGE UP (ref 96–108)
CO2 SERPL-SCNC: 24 MMOL/L — SIGNIFICANT CHANGE UP (ref 22–31)
CO2 SERPL-SCNC: 26 MMOL/L — SIGNIFICANT CHANGE UP (ref 22–31)
CREAT SERPL-MCNC: 0.6 MG/DL — SIGNIFICANT CHANGE UP (ref 0.5–1.3)
CREAT SERPL-MCNC: 0.62 MG/DL — SIGNIFICANT CHANGE UP (ref 0.5–1.3)
EGFR: 110 ML/MIN/1.73M2 — SIGNIFICANT CHANGE UP
EGFR: 111 ML/MIN/1.73M2 — SIGNIFICANT CHANGE UP
GLUCOSE SERPL-MCNC: 85 MG/DL — SIGNIFICANT CHANGE UP (ref 70–99)
GLUCOSE SERPL-MCNC: 93 MG/DL — SIGNIFICANT CHANGE UP (ref 70–99)
HCT VFR BLD CALC: 36 % — SIGNIFICANT CHANGE UP (ref 34.5–45)
HCT VFR BLD CALC: 37 % — SIGNIFICANT CHANGE UP (ref 34.5–45)
HGB BLD-MCNC: 11.6 G/DL — SIGNIFICANT CHANGE UP (ref 11.5–15.5)
HGB BLD-MCNC: 11.6 G/DL — SIGNIFICANT CHANGE UP (ref 11.5–15.5)
MAGNESIUM SERPL-MCNC: 1.7 MG/DL — SIGNIFICANT CHANGE UP (ref 1.6–2.6)
MAGNESIUM SERPL-MCNC: 1.8 MG/DL — SIGNIFICANT CHANGE UP (ref 1.6–2.6)
MCHC RBC-ENTMCNC: 27.2 PG — SIGNIFICANT CHANGE UP (ref 27–34)
MCHC RBC-ENTMCNC: 27.4 PG — SIGNIFICANT CHANGE UP (ref 27–34)
MCHC RBC-ENTMCNC: 31.4 GM/DL — LOW (ref 32–36)
MCHC RBC-ENTMCNC: 32.2 GM/DL — SIGNIFICANT CHANGE UP (ref 32–36)
MCV RBC AUTO: 84.3 FL — SIGNIFICANT CHANGE UP (ref 80–100)
MCV RBC AUTO: 87.5 FL — SIGNIFICANT CHANGE UP (ref 80–100)
NRBC # BLD: 0 /100 WBCS — SIGNIFICANT CHANGE UP (ref 0–0)
NRBC # BLD: 0 /100 WBCS — SIGNIFICANT CHANGE UP (ref 0–0)
PHOSPHATE SERPL-MCNC: 2.6 MG/DL — SIGNIFICANT CHANGE UP (ref 2.5–4.5)
PHOSPHATE SERPL-MCNC: 2.8 MG/DL — SIGNIFICANT CHANGE UP (ref 2.5–4.5)
PLATELET # BLD AUTO: 334 K/UL — SIGNIFICANT CHANGE UP (ref 150–400)
PLATELET # BLD AUTO: 372 K/UL — SIGNIFICANT CHANGE UP (ref 150–400)
POTASSIUM SERPL-MCNC: 4.6 MMOL/L — SIGNIFICANT CHANGE UP (ref 3.5–5.3)
POTASSIUM SERPL-MCNC: 4.7 MMOL/L — SIGNIFICANT CHANGE UP (ref 3.5–5.3)
POTASSIUM SERPL-SCNC: 4.6 MMOL/L — SIGNIFICANT CHANGE UP (ref 3.5–5.3)
POTASSIUM SERPL-SCNC: 4.7 MMOL/L — SIGNIFICANT CHANGE UP (ref 3.5–5.3)
RBC # BLD: 4.23 M/UL — SIGNIFICANT CHANGE UP (ref 3.8–5.2)
RBC # BLD: 4.27 M/UL — SIGNIFICANT CHANGE UP (ref 3.8–5.2)
RBC # FLD: 13.2 % — SIGNIFICANT CHANGE UP (ref 10.3–14.5)
RBC # FLD: 13.5 % — SIGNIFICANT CHANGE UP (ref 10.3–14.5)
SODIUM SERPL-SCNC: 134 MMOL/L — LOW (ref 135–145)
SODIUM SERPL-SCNC: 135 MMOL/L — SIGNIFICANT CHANGE UP (ref 135–145)
WBC # BLD: 13.73 K/UL — HIGH (ref 3.8–10.5)
WBC # BLD: 16.54 K/UL — HIGH (ref 3.8–10.5)
WBC # FLD AUTO: 13.73 K/UL — HIGH (ref 3.8–10.5)
WBC # FLD AUTO: 16.54 K/UL — HIGH (ref 3.8–10.5)

## 2022-07-01 PROCEDURE — 70450 CT HEAD/BRAIN W/O DYE: CPT | Mod: 26

## 2022-07-01 PROCEDURE — 99233 SBSQ HOSP IP/OBS HIGH 50: CPT

## 2022-07-01 PROCEDURE — 99024 POSTOP FOLLOW-UP VISIT: CPT

## 2022-07-01 PROCEDURE — 93970 EXTREMITY STUDY: CPT | Mod: 26

## 2022-07-01 RX ORDER — MAGNESIUM SULFATE 500 MG/ML
2 VIAL (ML) INJECTION ONCE
Refills: 0 | Status: COMPLETED | OUTPATIENT
Start: 2022-07-01 | End: 2022-07-01

## 2022-07-01 RX ORDER — LACOSAMIDE 50 MG/1
200 TABLET ORAL EVERY 12 HOURS
Refills: 0 | Status: DISCONTINUED | OUTPATIENT
Start: 2022-07-01 | End: 2022-07-01

## 2022-07-01 RX ADMIN — HYDROMORPHONE HYDROCHLORIDE 0.5 MILLIGRAM(S): 2 INJECTION INTRAMUSCULAR; INTRAVENOUS; SUBCUTANEOUS at 11:00

## 2022-07-01 RX ADMIN — Medication 1 SPRAY(S): at 13:10

## 2022-07-01 RX ADMIN — Medication 1 TABLET(S): at 17:14

## 2022-07-01 RX ADMIN — HYDROMORPHONE HYDROCHLORIDE 0.5 MILLIGRAM(S): 2 INJECTION INTRAMUSCULAR; INTRAVENOUS; SUBCUTANEOUS at 18:58

## 2022-07-01 RX ADMIN — SENNA PLUS 1 TABLET(S): 8.6 TABLET ORAL at 17:14

## 2022-07-01 RX ADMIN — TRAMADOL HYDROCHLORIDE 50 MILLIGRAM(S): 50 TABLET ORAL at 21:10

## 2022-07-01 RX ADMIN — Medication 1 SPRAY(S): at 05:25

## 2022-07-01 RX ADMIN — TRAMADOL HYDROCHLORIDE 50 MILLIGRAM(S): 50 TABLET ORAL at 06:30

## 2022-07-01 RX ADMIN — CYCLOBENZAPRINE HYDROCHLORIDE 10 MILLIGRAM(S): 10 TABLET, FILM COATED ORAL at 08:16

## 2022-07-01 RX ADMIN — TRAMADOL HYDROCHLORIDE 50 MILLIGRAM(S): 50 TABLET ORAL at 14:22

## 2022-07-01 RX ADMIN — HYDROMORPHONE HYDROCHLORIDE 0.5 MILLIGRAM(S): 2 INJECTION INTRAMUSCULAR; INTRAVENOUS; SUBCUTANEOUS at 18:35

## 2022-07-01 RX ADMIN — TRAMADOL HYDROCHLORIDE 50 MILLIGRAM(S): 50 TABLET ORAL at 21:40

## 2022-07-01 RX ADMIN — POLYETHYLENE GLYCOL 3350 17 GRAM(S): 17 POWDER, FOR SOLUTION ORAL at 13:09

## 2022-07-01 RX ADMIN — TRAMADOL HYDROCHLORIDE 50 MILLIGRAM(S): 50 TABLET ORAL at 07:00

## 2022-07-01 RX ADMIN — Medication 25 MILLIGRAM(S): at 13:09

## 2022-07-01 RX ADMIN — Medication 25 GRAM(S): at 05:23

## 2022-07-01 RX ADMIN — OXYCODONE HYDROCHLORIDE 5 MILLIGRAM(S): 5 TABLET ORAL at 09:06

## 2022-07-01 RX ADMIN — Medication 85 MILLIMOLE(S): at 05:23

## 2022-07-01 RX ADMIN — Medication 1 SPRAY(S): at 21:13

## 2022-07-01 RX ADMIN — TRAMADOL HYDROCHLORIDE 50 MILLIGRAM(S): 50 TABLET ORAL at 00:20

## 2022-07-01 RX ADMIN — SENNA PLUS 1 TABLET(S): 8.6 TABLET ORAL at 05:23

## 2022-07-01 RX ADMIN — TRAMADOL HYDROCHLORIDE 50 MILLIGRAM(S): 50 TABLET ORAL at 00:50

## 2022-07-01 RX ADMIN — CYCLOBENZAPRINE HYDROCHLORIDE 10 MILLIGRAM(S): 10 TABLET, FILM COATED ORAL at 17:14

## 2022-07-01 RX ADMIN — ATORVASTATIN CALCIUM 40 MILLIGRAM(S): 80 TABLET, FILM COATED ORAL at 21:13

## 2022-07-01 RX ADMIN — OXYCODONE HYDROCHLORIDE 5 MILLIGRAM(S): 5 TABLET ORAL at 10:36

## 2022-07-01 RX ADMIN — Medication 1 TABLET(S): at 05:23

## 2022-07-01 RX ADMIN — HYDROMORPHONE HYDROCHLORIDE 0.5 MILLIGRAM(S): 2 INJECTION INTRAMUSCULAR; INTRAVENOUS; SUBCUTANEOUS at 10:19

## 2022-07-01 RX ADMIN — OXYCODONE HYDROCHLORIDE 5 MILLIGRAM(S): 5 TABLET ORAL at 23:40

## 2022-07-01 RX ADMIN — CYCLOBENZAPRINE HYDROCHLORIDE 10 MILLIGRAM(S): 10 TABLET, FILM COATED ORAL at 16:44

## 2022-07-01 RX ADMIN — TRAMADOL HYDROCHLORIDE 50 MILLIGRAM(S): 50 TABLET ORAL at 15:19

## 2022-07-01 NOTE — PROGRESS NOTE ADULT - SUBJECTIVE AND OBJECTIVE BOX
General: No acute distress  HEENT: Anicteric sclerae  Cardiac: [id number]  Lungs: Clear, no wheezes, rhonchi.   Abdomen: Soft, non-tender, +BS.   Extremities: No c/c/e  Skin/Incision Site: Clean, dry and intact  Neurologic: Awake, alert, fully oriented, follows commands, PERRL, VFFtc, EOMI, face symmetric, tongue midline, no drift, full strength (RUE pain limited but able to give full strength).   SUMMARY:    NaPhos, Mg2+ repletion    LD @ 10cc/hr    General:   HEENT: Anicteric sclerae  Cardiac: [id number]  Lungs: Clear, no wheezes, rhonchi.   Abdomen: Soft, non-tender, +BS.   Extremities: No c/c/e  Skin/Incision Site: Clean, dry and intact  Neurologic: Awake, alert, fully oriented, follows commands, PERRL, VFFtc, EOMI, face symmetric, tongue midline, no drift, full strength (RUE pain limited but able to give full strength).   SUMMARY:  46 year-old woman with hyperlipidemia and chronic low back pain    24 HOUR EVENTS:  NaPhos, Mg2+ repletion    LD @ 10cc/hr    General:   HEENT: Anicteric sclerae  Cardiac: [id number]  Lungs: Clear, no wheezes, rhonchi.   Abdomen: Soft, non-tender, +BS.   Extremities: No c/c/e  Skin/Incision Site: Clean, dry and intact  Neurologic: Awake, alert, fully oriented, follows commands, PERRL, VFFtc, EOMI, face symmetric, tongue midline, no drift, full strength (RUE pain limited but able to give full strength).   SUMMARY:  46 year-old woman with hyperlipidemia and chronic low back pain underwent cerebrospinal fluid leak repair on 6/29/22 with pre-op lumbar drain placement.     24 HOUR EVENTS:  NaPhos, Mg2+ repletion. No cerebrospinal fluid leak noted.     VITALS/DATA/ORDERS: [x] Reviewed  LD @ 10cc/hr    EXAMINATION:   General: Calm  HEENT: Anicteric sclerae  Cardiac: [id number]  Lungs: Clear, no wheezes, rhonchi.   Abdomen: Soft, non-tender, +BS.   Extremities: No c/c/e  Skin/Incision Site: Clean, dry and intact  Neurologic: Awake, alert, fully oriented, follows commands, PERRL, VFFtc, EOMI, face symmetric, tongue midline, no drift, full strength (RUE pain limited but able to give full strength).

## 2022-07-01 NOTE — PROGRESS NOTE ADULT - SUBJECTIVE AND OBJECTIVE BOX
ENT ISSUE/POD: S/P CSF Leak Repair POD #2    HPI: 45 YO F with CSF leak from L ethmoid sinus S/P covid swab on 5/28 at [facility], over past several wks while traveling w/ increasing flow of rhinorrhea, eventually developed sxs cold and meningitis (HA/nausea/cough/gen weakness), adm [facility] on 6/20 febrile to 102, CSF PCR pos enterovirus, ID kept on cefepime. PT c/o salty taste. S/P Endonasal CSF Leak repair today. CSF leak in OR, repaired and LD placed. Pt was evaluated at bedside. C/o frontal HA, 8/10 pain, relived with pain meds.  Denies salty/Metallic taste in mouth, fever/chills,  cough, N/V, facial pain, changes in vision.       PAST MEDICAL & SURGICAL HISTORY:  HLD (hyperlipidemia)      Chronic back pain        Allergies    No Known Allergies    Intolerances      MEDICATIONS  (STANDING):  amoxicillin  875 milliGRAM(s)/clavulanate 1 Tablet(s) Oral two times a day  atorvastatin 40 milliGRAM(s) Oral at bedtime  chlorhexidine 4% Liquid 1 Application(s) Topical daily  enoxaparin Injectable 40 milliGRAM(s) SubCutaneous <User Schedule>  polyethylene glycol 3350 17 Gram(s) Oral daily  senna 1 Tablet(s) Oral two times a day  sodium chloride 0.65% Nasal 1 Spray(s) Both Nostrils three times a day  sodium chloride 0.9%. 1000 milliLiter(s) (75 mL/Hr) IV Continuous <Continuous>  topiramate 25 milliGRAM(s) Oral daily    MEDICATIONS  (PRN):  acetaminophen     Tablet .. 650 milliGRAM(s) Oral every 6 hours PRN Temp greater or equal to 38C (100.4F), Mild Pain (1 - 3)  bisacodyl Suppository 10 milliGRAM(s) Rectal daily PRN Constipation  cyclobenzaprine 10 milliGRAM(s) Oral three times a day PRN Muscle Spasm  HYDROmorphone  Injectable 0.5 milliGRAM(s) IV Push every 4 hours PRN breakthrough pain  melatonin 5 milliGRAM(s) Oral at bedtime PRN Insomnia  ondansetron Injectable 4 milliGRAM(s) IV Push once PRN Nausea and/or Vomiting  oxyCODONE    IR 5 milliGRAM(s) Oral every 4 hours PRN Moderate Pain (4 - 6)  saline laxative (FLEET) Rectal Enema 1 Enema Rectal once PRN constipation  traMADol 50 milliGRAM(s) Oral every 6 hours PRN Severe Pain (7 - 10)    social history: see consult     family history: see consult     ROS:   ENT: all negative except as noted in HPI   Pulm: denies SOB, cough, hemoptysis  Neuro: denies numbness/tingling, loss of sensation  Endo: denies heat/cold intolerance, excessive sweating      Vital Signs Last 24 Hrs  T(C): 36.8 (01 Jul 2022 07:00), Max: 37.4 (30 Jun 2022 18:00)  T(F): 98.2 (01 Jul 2022 07:00), Max: 99.4 (30 Jun 2022 18:00)  HR: 104 (01 Jul 2022 08:00) (74 - 104)  BP: 147/97 (01 Jul 2022 08:00) (130/66 - 154/80)  BP(mean): 112 (01 Jul 2022 08:00) (83 - 112)  RR: 28 (01 Jul 2022 08:00) (12 - 28)  SpO2: 95% (01 Jul 2022 08:00) (91% - 100%)                          11.6   16.54 )-----------( 372      ( 01 Jul 2022 01:11 )             37.0    07-01    135  |  100  |  6<L>  ----------------------------<  93  4.6   |  24  |  0.62    Ca    9.0      01 Jul 2022 01:11  Phos  2.6     07-01  Mg     1.7     07-01         PHYSICAL EXAM:  Gen: NAD, On Humidified O2.   Skin: No rashes, bruises, or lesions  Head: Normocephalic, Atraumatic  Face: no edema, erythema, or fluctuance. Parotid glands soft without mass  Eyes: no scleral injection  Nose: serosang tinged secretions in from Left> right Nare.  No CSF leak noted on exam.   Mouth: No Stridor / Drooling / Trismus.  Mucosa moist, tongue/uvula midline, crusted blood in posterior oropharynx.  Neck: Flat, supple, no lymphadenopathy, trachea midline, no masses  Lymphatic: No lymphadenopathy  Resp: On Humidified O2.   Neuro: facial nerve intact, no facial droop.

## 2022-07-01 NOTE — OCCUPATIONAL THERAPY INITIAL EVALUATION ADULT - LIVES WITH, PROFILE
Pt lives with family in private home, tub in bathroom. Pt I in ADLs and ambulation prior to admission

## 2022-07-01 NOTE — PROGRESS NOTE ADULT - ASSESSMENT
45 YO F with CSF leak from L ethmoid sinus S/P Endonasal CSF Leak repair POD #2. CSF leak in OR, repaired and LD placed. C/o frontal HA, 8/10 pain, relived with pain meds. No CSF Leak noted on exam.

## 2022-07-01 NOTE — PROGRESS NOTE ADULT - ASSESSMENT
Assessment:      NEURO:  -neuro check q4  -pain management w/ tylenol & oxycodon, tramadol   -Monitor lumbar drain 10cc/hr   -Pitutary precautions: i.e no straws, no bipap, no nasal swabs,   -PT evaluation placed  -c/w topamax     PULMONARY:  saturating well on RA,   -continue to monitor on pulse o2   -no bipap/cpap    CARDIOVASCULAR:  monitor on telemetry   vitals q4  sbp goal 100-160    GASTROENTEROLOGY:  regular   ensure BMs w/ Miralax & senna  GI ppx : Protonix 40mg qd  Daily stool count, LBM prior to arrival    RENAL/:  -check BMP qd  -strict i/o's ;     ENDOCRINE:  A1c- 5.5%    HEME/ONC:  DVT ppx: SQL   b/l SCDs    INFECTIOUS:   Monitor for fevers   Augmentin until 7/3   ASSESSMENT/PLAN: cerebrospinal fluid leak status post repair/lumbar drain, post-operative day 2     NEURO:  -neuro check q4  -pain management w/ tylenol & oxycodon, tramadol   -Monitor lumbar drain 10cc/hr  - CT head with small SDH; repeat CT Head in AM - may need to decreased lumbar drain drainage    -Pitutary precautions: i.e no straws, no bipap, no nasal swabs,   -PT evaluation placed  -c/w topamax     PULMONARY:  saturating well on RA,   -continue to monitor on pulse o2   -no bipap/cpap    CARDIOVASCULAR:  monitor on telemetry   vitals q4  sbp goal 100-160    GASTROENTEROLOGY:  regular   ensure BMs w/ Miralax & senna    RENAL/:  -check BMP qd  -strict i/o's ;     ENDOCRINE:  A1c- 5.5%    HEME/ONC:  DVT ppx: SQL - hold for new SDH  b/l SCDs    INFECTIOUS:   Monitor for fevers   Augmentin until 7/3    Reviewed case and imaging with Dr. Hanson    Patient medically complex but not critically ill

## 2022-07-01 NOTE — PROGRESS NOTE ADULT - SUBJECTIVE AND OBJECTIVE BOX
INTERVAL HISTORY:   46F p/w CSF leak L ethmoid sinus s/p covid swab on 5/28, over past several wks while traveling w/ increaing flow of rhinorrhea, eventually developed sxs cold and meningitis (HA/nausea/cough/gen weakness), adm [facility] on 6/20 febrile to 102, CSF PCR pos enterovirus, ID kept on cefepime. Exam: +rhinorrhea from L nare, otherwise intact, no HA, AVSS.  (28 Jun 2022 23:02)    EVENTS: CTH w trace L SDH acute, stable exam, still draining    PHYSICAL EXAM:  General: No Acute Distress   Neurological: Awake, alert oriented to person, place and time, Following Commands, PERRL, EOMI, Face Symmetrical, Speech Fluent, Moving all extremities, Muscle Strength normal in all four extremities, No Drift, Sensation to Light Touch Intact, visual fields intact   Pulmonary: Clear to Auscultation, No Rales, No Rhonchi, No Wheezes   Cardiovascular: S1, S2, Regular Rate and Rhythm   Gastrointestinal: Soft, Nontender, Nondistended   Extremities: No calf tenderness       VITALS/MEDS/LABS/ORDERS/IMAGING: Reviewed

## 2022-07-01 NOTE — PROGRESS NOTE ADULT - ASSESSMENT
Assessment:  47 YO F with CSF leak from L ethmoid sinus S/P Endonasal CSF Leak repair POD #1. CSF leak in OR, repaired and LD placed @10cc/hr.    NEURO:  -neuro check q1  -pain management w/ tylenol & oxycodone 5, tramadol 50 prn severe pain  dilaudid 0.5 for breakthrough  -Monitor lumbar drain 10cc/hr   -Pitutary precautions: i.e no straws, no bipap, no nasal swabs,   -PT evaluation placed  -c/w topamax

## 2022-07-01 NOTE — PROGRESS NOTE ADULT - SUBJECTIVE AND OBJECTIVE BOX
Patient seen and examined at bedside.    --Anticoagulation--  enoxaparin Injectable 40 milliGRAM(s) SubCutaneous <User Schedule>    T(C): 37.4 (06-30-22 @ 18:00), Max: 37.4 (06-30-22 @ 18:00)  HR: 91 (06-30-22 @ 21:00) (74 - 103)  BP: 149/75 (06-30-22 @ 21:00) (114/67 - 154/80)  RR: 18 (06-30-22 @ 21:00) (12 - 20)  SpO2: 97% (06-30-22 @ 21:00) (91% - 100%)  Wt(kg): --    Exam:  AOx3, FC, PERRL, EOMI, no facial, 5/5 throughout, no drift

## 2022-07-01 NOTE — OCCUPATIONAL THERAPY INITIAL EVALUATION ADULT - PERTINENT HX OF CURRENT PROBLEM, REHAB EVAL
47 yo F p/w CSF leak L ethmoid sinus s/p covid swab on 5/28, over past several wks while traveling w/ increaing flow of rhinorrhea, eventually developed sxs cold and meningitis (HA/nausea/cough/gen weakness), adm [facility] on 6/20 febrile to 102, CSF PCR pos enterovirus, ID kept on cefepime. Exam: +rhinorrhea from L nare, otherwise intact, no HA, AVSS.  See below

## 2022-07-01 NOTE — PROGRESS NOTE ADULT - ASSESSMENT
Assessment:  45 YO F with CSF leak from L ethmoid sinus S/P Endonasal CSF Leak repair POD #1. CSF leak in OR, repaired and LD placed @10cc/hr.    NEURO:  -neuro check q4  -pain management w/ tylenol & oxycodone 5, tramadol 50 prn severe pain  -Monitor lumbar drain 10cc/hr   CTH w small acute L SDH, repeat CTH in am  -Pitutary precautions: i.e no straws, no bipap, no nasal swabs,   -PT evaluation placed  -c/w topamax     PULMONARY:   saturating well on RA,   -continue to monitor on pulse o2   -no bipap/cpap    CARDIOVASCULAR:  monitor on telemetry   vitals q4  sbp goal 100-160    GASTROENTEROLOGY:  regular   ensure BMs w/ Miralax & senna  GI ppx : Protonix 40mg qd  Daily stool count    RENAL/:  -check BMP qd  -strict i/o's ;     ENDOCRINE:  A1c- 5.5%    HEME/ONC:  DVT ppx: hold  b/l SCDs  LED neg 7/1    INFECTIOUS:   Monitor for fevers   c/w amoxicillin for 3 more days, discussed w ID   Assessment:  45 YO F with CSF leak from L ethmoid sinus S/P Endonasal CSF Leak repair POD #1. CSF leak in OR, repaired and LD placed @10cc/hr.    NEURO:  -neuro check q4  -pain management w/ tylenol & oxycodone 5, tramadol 50 prn severe pain  -Monitor lumbar drain 10cc/hr   CTH w small acute L SDH, repeat CTH in am  -Pitutary precautions: i.e no straws, no bipap, no nasal swabs,   -PT evaluation placed  -c/w topamax     PULMONARY:   saturating well on RA, PRN face tent if desats while asleep  -continue to monitor on pulse o2   -no bipap/cpap, no IS    CARDIOVASCULAR:  monitor on telemetry   vitals q4  sbp goal 100-160    GASTROENTEROLOGY:  regular   ensure BMs w/ Miralax & senna  Daily stool count  LBM 6/28    RENAL/:  NS 75cc (poor po intake)  -check BMP qd  -strict i/o's ;     ENDOCRINE:  A1c- 5.5%    HEME/ONC:  DVT ppx: hold  b/l SCDs  LED neg 7/1    INFECTIOUS:   Monitor for fevers   c/w amoxicillin for 3 more days, discussed w ID   Assessment:  45 YO F with CSF leak from L ethmoid sinus S/P Endonasal CSF Leak repair POD #2. CSF leak in OR, repaired and LD placed @10cc/hr.    NEURO:  -neuro check q4  -pain management w/ tylenol & oxycodone 5, tramadol 50 prn severe pain  -Monitor lumbar drain 5cc/hr overnight  CTH w small acute L SDH, repeat CTH in am  -Pitutary precautions: i.e no straws, no bipap, no nasal swabs,   -PT evaluation placed  -c/w topamax     PULMONARY:   saturating well on RA, PRN face tent if desats while asleep  -continue to monitor on pulse o2   -no bipap/cpap, no IS    CARDIOVASCULAR:  monitor on telemetry   vitals q4  sbp goal 100-160    GASTROENTEROLOGY:  regular   ensure BMs w/ Miralax & senna  Daily stool count  LBM 6/28    RENAL/:  NS 75cc (poor po intake)  -check BMP qd  -strict i/o's ;     ENDOCRINE:  A1c- 5.5%    HEME/ONC:  DVT ppx: hold  b/l SCDs  LED neg 7/1    INFECTIOUS:   Monitor for fevers   c/w amoxicillin for 3 more days, discussed w ID

## 2022-07-02 PROCEDURE — 99233 SBSQ HOSP IP/OBS HIGH 50: CPT

## 2022-07-02 PROCEDURE — 70450 CT HEAD/BRAIN W/O DYE: CPT | Mod: 26

## 2022-07-02 RX ORDER — MAGNESIUM SULFATE 500 MG/ML
1 VIAL (ML) INJECTION ONCE
Refills: 0 | Status: COMPLETED | OUTPATIENT
Start: 2022-07-02 | End: 2022-07-02

## 2022-07-02 RX ORDER — ENOXAPARIN SODIUM 100 MG/ML
40 INJECTION SUBCUTANEOUS
Refills: 0 | Status: DISCONTINUED | OUTPATIENT
Start: 2022-07-02 | End: 2022-07-05

## 2022-07-02 RX ADMIN — Medication 100 GRAM(S): at 02:33

## 2022-07-02 RX ADMIN — SENNA PLUS 1 TABLET(S): 8.6 TABLET ORAL at 17:29

## 2022-07-02 RX ADMIN — ENOXAPARIN SODIUM 40 MILLIGRAM(S): 100 INJECTION SUBCUTANEOUS at 17:29

## 2022-07-02 RX ADMIN — CYCLOBENZAPRINE HYDROCHLORIDE 10 MILLIGRAM(S): 10 TABLET, FILM COATED ORAL at 12:17

## 2022-07-02 RX ADMIN — Medication 1 SPRAY(S): at 05:32

## 2022-07-02 RX ADMIN — Medication 1 SPRAY(S): at 17:28

## 2022-07-02 RX ADMIN — Medication 1 TABLET(S): at 05:32

## 2022-07-02 RX ADMIN — OXYCODONE HYDROCHLORIDE 5 MILLIGRAM(S): 5 TABLET ORAL at 23:44

## 2022-07-02 RX ADMIN — Medication 1 SPRAY(S): at 21:04

## 2022-07-02 RX ADMIN — OXYCODONE HYDROCHLORIDE 5 MILLIGRAM(S): 5 TABLET ORAL at 23:14

## 2022-07-02 RX ADMIN — POLYETHYLENE GLYCOL 3350 17 GRAM(S): 17 POWDER, FOR SOLUTION ORAL at 11:56

## 2022-07-02 RX ADMIN — CHLORHEXIDINE GLUCONATE 1 APPLICATION(S): 213 SOLUTION TOPICAL at 21:04

## 2022-07-02 RX ADMIN — SENNA PLUS 1 TABLET(S): 8.6 TABLET ORAL at 05:52

## 2022-07-02 RX ADMIN — TRAMADOL HYDROCHLORIDE 50 MILLIGRAM(S): 50 TABLET ORAL at 15:30

## 2022-07-02 RX ADMIN — ATORVASTATIN CALCIUM 40 MILLIGRAM(S): 80 TABLET, FILM COATED ORAL at 21:03

## 2022-07-02 RX ADMIN — SODIUM CHLORIDE 75 MILLILITER(S): 9 INJECTION INTRAMUSCULAR; INTRAVENOUS; SUBCUTANEOUS at 19:12

## 2022-07-02 RX ADMIN — Medication 1 TABLET(S): at 17:29

## 2022-07-02 RX ADMIN — CHLORHEXIDINE GLUCONATE 1 APPLICATION(S): 213 SOLUTION TOPICAL at 02:34

## 2022-07-02 RX ADMIN — Medication 63.75 MILLIMOLE(S): at 02:34

## 2022-07-02 RX ADMIN — Medication 5 MILLIGRAM(S): at 05:32

## 2022-07-02 RX ADMIN — OXYCODONE HYDROCHLORIDE 5 MILLIGRAM(S): 5 TABLET ORAL at 00:10

## 2022-07-02 RX ADMIN — TRAMADOL HYDROCHLORIDE 50 MILLIGRAM(S): 50 TABLET ORAL at 14:28

## 2022-07-02 RX ADMIN — CYCLOBENZAPRINE HYDROCHLORIDE 10 MILLIGRAM(S): 10 TABLET, FILM COATED ORAL at 04:34

## 2022-07-02 NOTE — PROGRESS NOTE ADULT - ASSESSMENT
ASSESSMENT/PLAN: cerebrospinal fluid leak status post repair/lumbar drain, post-operative day 3     NEURO:  -neuro check q4  -pain management w/ tylenol & oxycodon, tramadol   -Monitor lumbar drain 5cc/hr  - CT head with small SDH; repeat CT Head - may need to decreased lumbar drain drainage    -Pitutary precautions: i.e no straws, no bipap, no nasal swabs,   -PT evaluation placed  -c/w topamax     PULMONARY:  saturating well on RA,   -continue to monitor on pulse o2   -no bipap/cpap    CARDIOVASCULAR:  monitor on telemetry   vitals q4  sbp goal 100-160    GASTROENTEROLOGY:  regular   ensure BMs w/ Miralax & senna    RENAL/:  -check BMP qd  -strict i/o's ;   IVF for poor po intake    ENDOCRINE:  A1c- 5.5%    HEME/ONC:  DVT ppx: SQL - hold for new SDH  b/l SCDs  LED neg 7/1    INFECTIOUS:   Monitor for fevers   Augmentin until 7/3    Reviewed case and imaging with Dr. Hanson    Patient medically complex but not critically ill

## 2022-07-02 NOTE — PROGRESS NOTE ADULT - SUBJECTIVE AND OBJECTIVE BOX
SUMMARY:  46 year-old woman with hyperlipidemia and chronic low back pain underwent cerebrospinal fluid leak repair on 6/29/22 with pre-op lumbar drain placement.     24 HOUR EVENTS:  LD dropped to 5 cc/hr    VITALS/DATA/ORDERS: [x] Reviewed  LD @ 5cc/h    EXAMINATION:   General: Calm  HEENT: Anicteric sclerae  Cardiac: [id number]  Lungs: Clear, no wheezes, rhonchi.   Abdomen: Soft, non-tender, +BS.   Extremities: No c/c/e  Skin/Incision Site: Clean, dry and intact  Neurologic: Awake, alert, fully oriented, follows commands, PERRL, VFFtc, EOMI, face symmetric, tongue midline, no drift, full strength (RUE pain limited but able to give full strength).

## 2022-07-02 NOTE — PROGRESS NOTE ADULT - SUBJECTIVE AND OBJECTIVE BOX
ENT ISSUE/POD: S/P CSF Leak Repair POD #3    HPI: 47 YO F with CSF leak from L ethmoid sinus S/P covid swab on 5/28 at [facility], over past several wks while traveling w/ increasing flow of rhinorrhea, eventually developed sxs cold and meningitis (HA/nausea/cough/gen weakness), adm [facility] on 6/20 febrile to 102, CSF PCR pos enterovirus, ID kept on cefepime. PT c/o salty taste. S/P Endonasal CSF Leak repair today. CSF leak in OR, repaired and LD placed. Pt was evaluated at bedside. C/o frontal HA, 8/10 pain, relived with pain meds.  Denies salty/Metallic taste in mouth, fever/chills,  cough, N/V, facial pain, changes in vision.         PAST MEDICAL & SURGICAL HISTORY:  HLD (hyperlipidemia)      Chronic back pain        Allergies    No Known Allergies    Intolerances      MEDICATIONS  (STANDING):  amoxicillin  875 milliGRAM(s)/clavulanate 1 Tablet(s) Oral two times a day  atorvastatin 40 milliGRAM(s) Oral at bedtime  chlorhexidine 4% Liquid 1 Application(s) Topical daily  polyethylene glycol 3350 17 Gram(s) Oral daily  senna 1 Tablet(s) Oral two times a day  sodium chloride 0.65% Nasal 1 Spray(s) Both Nostrils three times a day  sodium chloride 0.9%. 1000 milliLiter(s) (75 mL/Hr) IV Continuous <Continuous>  topiramate 25 milliGRAM(s) Oral daily    MEDICATIONS  (PRN):  acetaminophen     Tablet .. 650 milliGRAM(s) Oral every 6 hours PRN Temp greater or equal to 38C (100.4F), Mild Pain (1 - 3)  bisacodyl 5 milliGRAM(s) Oral every 12 hours PRN Constipation  bisacodyl Suppository 10 milliGRAM(s) Rectal daily PRN Constipation  cyclobenzaprine 10 milliGRAM(s) Oral three times a day PRN Muscle Spasm  HYDROmorphone  Injectable 0.5 milliGRAM(s) IV Push every 4 hours PRN breakthrough pain  melatonin 5 milliGRAM(s) Oral at bedtime PRN Insomnia  ondansetron Injectable 4 milliGRAM(s) IV Push once PRN Nausea and/or Vomiting  oxyCODONE    IR 5 milliGRAM(s) Oral every 4 hours PRN Moderate Pain (4 - 6)  saline laxative (FLEET) Rectal Enema 1 Enema Rectal once PRN constipation  traMADol 50 milliGRAM(s) Oral every 6 hours PRN Severe Pain (7 - 10)      Social History: see consult    Family history: see consult    ROS:   ENT: all negative except as noted in HPI   Pulm: denies SOB, cough, hemoptysis  Neuro: denies numbness/tingling, loss of sensation  Endo: denies heat/cold intolerance, excessive sweating      Vital Signs Last 24 Hrs  T(C): 36.8 (02 Jul 2022 07:00), Max: 37.5 (02 Jul 2022 03:00)  T(F): 98.2 (02 Jul 2022 07:00), Max: 99.5 (02 Jul 2022 03:00)  HR: 89 (02 Jul 2022 07:00) (75 - 98)  BP: 142/82 (02 Jul 2022 07:00) (117/73 - 152/79)  BP(mean): 97 (02 Jul 2022 07:00) (84 - 101)  RR: 13 (02 Jul 2022 07:00) (13 - 18)  SpO2: 93% (02 Jul 2022 07:00) (91% - 97%)                          11.6   13.73 )-----------( 334      ( 01 Jul 2022 20:40 )             36.0    07-01    134<L>  |  99  |  5<L>  ----------------------------<  85  4.7   |  26  |  0.60    Ca    8.9      01 Jul 2022 20:40  Phos  2.8     07-01  Mg     1.8     07-01         PHYSICAL EXAM:  Gen: NAD, On Humidified O2.   Skin: No rashes, bruises, or lesions  Head: Normocephalic, Atraumatic  Face: no edema, erythema, or fluctuance. Parotid glands soft without mass  Eyes: no scleral injection  Nose: serosang tinged secretions in from Left> right Nare.  No CSF leak noted on exam.   Mouth: No Stridor / Drooling / Trismus.  Mucosa moist, tongue/uvula midline, crusted blood in posterior oropharynx.  Neck: Flat, supple, no lymphadenopathy, trachea midline, no masses  Lymphatic: No lymphadenopathy  Resp: On Humidified O2.   Neuro: facial nerve intact, no facial droop.

## 2022-07-02 NOTE — PROGRESS NOTE ADULT - ASSESSMENT
47 YO F with CSF leak from L ethmoid sinus S/P Endonasal CSF Leak repair POD #3. CSF leak in OR, repaired and LD placed. C/o frontal HA, improving, relived with pain meds. No CSF Leak noted on exam.  47 YO F with CSF leak from L ethmoid sinus S/P Endonasal CSF Leak repair POD #3. CSF leak in OR, repaired and LD placed. C/o frontal HA which is improving, relived with pain meds. No CSF Leak noted on exam.

## 2022-07-02 NOTE — PROGRESS NOTE ADULT - ASSESSMENT
ASSESSMENT/PLAN: cerebrospinal fluid leak status post repair/lumbar drain, post-operative day 3    NEURO:  -neuro check q4  -pain management w/ tylenol & oxycodon, tramadol   -Monitor lumbar drain 5cc/hr  - CT head with small SDH; repeat CT Head - stable  -Pitutary precautions: i.e no straws, no bipap, no nasal swabs,   -PT evaluation placed  -c/w topamax     PULMONARY:  saturating well on RA,   -continue to monitor on pulse o2   -no bipap/cpap    CARDIOVASCULAR:  monitor on telemetry   vitals q4  sbp goal 100-160    GASTROENTEROLOGY:  regular   ensure BMs w/ Miralax & senna    RENAL/:  -check BMP qd  -strict i/o's ;   IVF for poor po intake    ENDOCRINE:  A1c- 5.5%    HEME/ONC:  DVT ppx: SQL -resume  b/l SCDs  LED neg 7/1    INFECTIOUS:   Monitor for fevers   Augmentin until 7/3    Reviewed case and imaging with Dr. Hanson    Patient medically complex but not critically ill

## 2022-07-02 NOTE — PROGRESS NOTE ADULT - SUBJECTIVE AND OBJECTIVE BOX
Patient seen and examined at bedside.    --Anticoagulation--    T(C): 37 (07-01-22 @ 19:00), Max: 37.2 (07-01-22 @ 03:00)  HR: 92 (07-01-22 @ 19:00) (74 - 104)  BP: 134/67 (07-01-22 @ 19:00) (134/67 - 152/79)  RR: 18 (07-01-22 @ 19:00) (12 - 28)  SpO2: 95% (07-01-22 @ 19:00) (91% - 99%)  Wt(kg): --    Exam:  AOx3, FC, PERRL, EOMI, no facial, 5/5 throughout, no drift

## 2022-07-02 NOTE — PROGRESS NOTE ADULT - SUBJECTIVE AND OBJECTIVE BOX
SUMMARY:  46 year-old woman with hyperlipidemia and chronic low back pain underwent cerebrospinal fluid leak repair on 6/29/22 with pre-op lumbar drain placement.     24 HOUR EVENTS:  LD dropped to 5 cc/hr. CTH stable    VITALS/DATA/ORDERS: [x] Reviewed  LD @ 5cc/h    EXAMINATION:   General: Calm  HEENT: Anicteric sclerae  Cardiac: [id number]  Lungs: Clear, no wheezes, rhonchi.   Abdomen: Soft, non-tender, +BS.   Extremities: No c/c/e  Skin/Incision Site: Clean, dry and intact  Neurologic: Awake, alert, fully oriented, follows commands, PERRL, EOMI, face symmetric, tongue midline, no drift, full strength (RUE pain limited but able to give full strength).

## 2022-07-02 NOTE — PROGRESS NOTE ADULT - ASSESSMENT
Assessment:  47 YO F with CSF leak from L ethmoid sinus S/P Endonasal CSF Leak repair POD #1. CSF leak in OR, repaired and LD placed @10cc/hr.    NEURO:  -neuro check q4  -pain management w/ tylenol & oxycodone 5, tramadol 50 prn severe pain  -Monitor lumbar drain 10cc/hr   CTH w small acute L SDH, repeat CTH in am  -Pitutary precautions: i.e no straws, no bipap, no nasal swabs,   -PT evaluation placed  -c/w topamax

## 2022-07-03 LAB
ANION GAP SERPL CALC-SCNC: 12 MMOL/L — SIGNIFICANT CHANGE UP (ref 5–17)
ANION GAP SERPL CALC-SCNC: 16 MMOL/L — SIGNIFICANT CHANGE UP (ref 5–17)
ANION GAP SERPL CALC-SCNC: 8 MMOL/L — SIGNIFICANT CHANGE UP (ref 5–17)
BUN SERPL-MCNC: 4 MG/DL — LOW (ref 7–23)
BUN SERPL-MCNC: 5 MG/DL — LOW (ref 7–23)
BUN SERPL-MCNC: <4 MG/DL — LOW (ref 7–23)
CALCIUM SERPL-MCNC: 4.6 MG/DL — CRITICAL LOW (ref 8.4–10.5)
CALCIUM SERPL-MCNC: 9 MG/DL — SIGNIFICANT CHANGE UP (ref 8.4–10.5)
CALCIUM SERPL-MCNC: 9.1 MG/DL — SIGNIFICANT CHANGE UP (ref 8.4–10.5)
CHLORIDE SERPL-SCNC: 100 MMOL/L — SIGNIFICANT CHANGE UP (ref 96–108)
CHLORIDE SERPL-SCNC: 123 MMOL/L — HIGH (ref 96–108)
CHLORIDE SERPL-SCNC: 99 MMOL/L — SIGNIFICANT CHANGE UP (ref 96–108)
CO2 SERPL-SCNC: 13 MMOL/L — LOW (ref 22–31)
CO2 SERPL-SCNC: 20 MMOL/L — LOW (ref 22–31)
CO2 SERPL-SCNC: 22 MMOL/L — SIGNIFICANT CHANGE UP (ref 22–31)
CREAT SERPL-MCNC: 0.54 MG/DL — SIGNIFICANT CHANGE UP (ref 0.5–1.3)
CREAT SERPL-MCNC: 0.56 MG/DL — SIGNIFICANT CHANGE UP (ref 0.5–1.3)
CREAT SERPL-MCNC: <0.3 MG/DL — LOW (ref 0.5–1.3)
EGFR: 113 ML/MIN/1.73M2 — SIGNIFICANT CHANGE UP
EGFR: 114 ML/MIN/1.73M2 — SIGNIFICANT CHANGE UP
EGFR: 131 ML/MIN/1.73M2 — SIGNIFICANT CHANGE UP
GLUCOSE SERPL-MCNC: 54 MG/DL — CRITICAL LOW (ref 70–99)
GLUCOSE SERPL-MCNC: 84 MG/DL — SIGNIFICANT CHANGE UP (ref 70–99)
GLUCOSE SERPL-MCNC: 88 MG/DL — SIGNIFICANT CHANGE UP (ref 70–99)
HCT VFR BLD CALC: 40.4 % — SIGNIFICANT CHANGE UP (ref 34.5–45)
HGB BLD-MCNC: 12.8 G/DL — SIGNIFICANT CHANGE UP (ref 11.5–15.5)
MAGNESIUM SERPL-MCNC: 0.9 MG/DL — CRITICAL LOW (ref 1.6–2.6)
MAGNESIUM SERPL-MCNC: 1.7 MG/DL — SIGNIFICANT CHANGE UP (ref 1.6–2.6)
MAGNESIUM SERPL-MCNC: 1.9 MG/DL — SIGNIFICANT CHANGE UP (ref 1.6–2.6)
MCHC RBC-ENTMCNC: 27.8 PG — SIGNIFICANT CHANGE UP (ref 27–34)
MCHC RBC-ENTMCNC: 31.7 GM/DL — LOW (ref 32–36)
MCV RBC AUTO: 87.6 FL — SIGNIFICANT CHANGE UP (ref 80–100)
NRBC # BLD: 0 /100 WBCS — SIGNIFICANT CHANGE UP (ref 0–0)
PHOSPHATE SERPL-MCNC: 1.2 MG/DL — LOW (ref 2.5–4.5)
PHOSPHATE SERPL-MCNC: 2.4 MG/DL — LOW (ref 2.5–4.5)
PHOSPHATE SERPL-MCNC: 2.5 MG/DL — SIGNIFICANT CHANGE UP (ref 2.5–4.5)
PLATELET # BLD AUTO: 373 K/UL — SIGNIFICANT CHANGE UP (ref 150–400)
POTASSIUM SERPL-MCNC: 2.2 MMOL/L — CRITICAL LOW (ref 3.5–5.3)
POTASSIUM SERPL-MCNC: 4.2 MMOL/L — SIGNIFICANT CHANGE UP (ref 3.5–5.3)
POTASSIUM SERPL-MCNC: 4.3 MMOL/L — SIGNIFICANT CHANGE UP (ref 3.5–5.3)
POTASSIUM SERPL-SCNC: 2.2 MMOL/L — CRITICAL LOW (ref 3.5–5.3)
POTASSIUM SERPL-SCNC: 4.2 MMOL/L — SIGNIFICANT CHANGE UP (ref 3.5–5.3)
POTASSIUM SERPL-SCNC: 4.3 MMOL/L — SIGNIFICANT CHANGE UP (ref 3.5–5.3)
RBC # BLD: 4.61 M/UL — SIGNIFICANT CHANGE UP (ref 3.8–5.2)
RBC # FLD: 13.1 % — SIGNIFICANT CHANGE UP (ref 10.3–14.5)
SODIUM SERPL-SCNC: 132 MMOL/L — LOW (ref 135–145)
SODIUM SERPL-SCNC: 137 MMOL/L — SIGNIFICANT CHANGE UP (ref 135–145)
SODIUM SERPL-SCNC: 144 MMOL/L — SIGNIFICANT CHANGE UP (ref 135–145)
WBC # BLD: 12.46 K/UL — HIGH (ref 3.8–10.5)
WBC # FLD AUTO: 12.46 K/UL — HIGH (ref 3.8–10.5)

## 2022-07-03 PROCEDURE — 99233 SBSQ HOSP IP/OBS HIGH 50: CPT

## 2022-07-03 PROCEDURE — 99233 SBSQ HOSP IP/OBS HIGH 50: CPT | Mod: 25

## 2022-07-03 RX ORDER — MAGNESIUM SULFATE 500 MG/ML
1 VIAL (ML) INJECTION ONCE
Refills: 0 | Status: COMPLETED | OUTPATIENT
Start: 2022-07-03 | End: 2022-07-03

## 2022-07-03 RX ORDER — SODIUM CHLORIDE 9 MG/ML
1000 INJECTION INTRAMUSCULAR; INTRAVENOUS; SUBCUTANEOUS ONCE
Refills: 0 | Status: COMPLETED | OUTPATIENT
Start: 2022-07-03 | End: 2022-07-03

## 2022-07-03 RX ADMIN — Medication 1 TABLET(S): at 17:05

## 2022-07-03 RX ADMIN — Medication 100 GRAM(S): at 23:36

## 2022-07-03 RX ADMIN — SENNA PLUS 1 TABLET(S): 8.6 TABLET ORAL at 17:05

## 2022-07-03 RX ADMIN — CYCLOBENZAPRINE HYDROCHLORIDE 10 MILLIGRAM(S): 10 TABLET, FILM COATED ORAL at 22:57

## 2022-07-03 RX ADMIN — Medication 1 SPRAY(S): at 06:00

## 2022-07-03 RX ADMIN — SENNA PLUS 1 TABLET(S): 8.6 TABLET ORAL at 06:00

## 2022-07-03 RX ADMIN — POLYETHYLENE GLYCOL 3350 17 GRAM(S): 17 POWDER, FOR SOLUTION ORAL at 11:25

## 2022-07-03 RX ADMIN — Medication 1 TABLET(S): at 06:00

## 2022-07-03 RX ADMIN — ATORVASTATIN CALCIUM 40 MILLIGRAM(S): 80 TABLET, FILM COATED ORAL at 21:04

## 2022-07-03 RX ADMIN — OXYCODONE HYDROCHLORIDE 5 MILLIGRAM(S): 5 TABLET ORAL at 14:02

## 2022-07-03 RX ADMIN — ENOXAPARIN SODIUM 40 MILLIGRAM(S): 100 INJECTION SUBCUTANEOUS at 17:05

## 2022-07-03 RX ADMIN — Medication 25 MILLIGRAM(S): at 11:25

## 2022-07-03 RX ADMIN — Medication 1 SPRAY(S): at 14:29

## 2022-07-03 RX ADMIN — Medication 10 MILLIGRAM(S): at 18:20

## 2022-07-03 RX ADMIN — OXYCODONE HYDROCHLORIDE 5 MILLIGRAM(S): 5 TABLET ORAL at 15:00

## 2022-07-03 RX ADMIN — Medication 1 SPRAY(S): at 21:04

## 2022-07-03 RX ADMIN — CHLORHEXIDINE GLUCONATE 1 APPLICATION(S): 213 SOLUTION TOPICAL at 21:05

## 2022-07-03 RX ADMIN — SODIUM CHLORIDE 75 MILLILITER(S): 9 INJECTION INTRAMUSCULAR; INTRAVENOUS; SUBCUTANEOUS at 07:25

## 2022-07-03 RX ADMIN — SODIUM CHLORIDE 1000 MILLILITER(S): 9 INJECTION INTRAMUSCULAR; INTRAVENOUS; SUBCUTANEOUS at 22:57

## 2022-07-03 NOTE — PROGRESS NOTE ADULT - ASSESSMENT
45 YO F with CSF leak from L ethmoid sinus S/P Endonasal CSF Leak repair POD #3. CSF leak in OR, repaired and LD placed. C/o frontal HA, 8/10 pain, relived with pain meds. No CSF Leak noted on exam.

## 2022-07-03 NOTE — PROGRESS NOTE ADULT - ASSESSMENT
ASSESSMENT/PLAN: cerebrospinal fluid leak status post repair/lumbar drain, post-operative day 4    NEURO:  -neuro check q4  -pain management w/ tylenol & oxycodon, tramadol   -Monitor lumbar drain 5cc/hr  - CT head with small SDH; repeat CT Head - stable  -Pitutary precautions: i.e no straws, no bipap, no nasal swabs,   -PT evaluation placed  -c/w topamax     PULMONARY:  saturating well on RA,   -continue to monitor on pulse o2   -no bipap/cpap    CARDIOVASCULAR:  monitor on telemetry   vitals q4  sbp goal 100-160    GASTROENTEROLOGY:  regular   ensure BMs w/ Miralax & senna    RENAL/:  -check BMP qd  -strict i/o's ;   IVF for poor po intake    ENDOCRINE:  A1c- 5.5%    HEME/ONC:  DVT ppx: SQL   b/l SCDs  LED neg 7/1    INFECTIOUS:   Monitor for fevers   Augmentin until 7/3        Patient medically complex but not critically ill

## 2022-07-03 NOTE — PROGRESS NOTE ADULT - SUBJECTIVE AND OBJECTIVE BOX
ENT ISSUE/POD: S/P CSF Leak Repair POD #3      HPI: 45 YO F with CSF leak from L ethmoid sinus S/P covid swab on 5/28 at [facility], over past several wks while traveling w/ increasing flow of rhinorrhea, eventually developed sxs cold and meningitis (HA/nausea/cough/gen weakness), adm [facility] on 6/20 febrile to 102, CSF PCR pos enterovirus, ID kept on cefepime. PT c/o salty taste. S/P Endonasal CSF Leak repair today. CSF leak in OR, repaired and LD placed. Pt was evaluated at bedside. C/o frontal HA, 8/10 pain, relived with pain meds.  Denies salty/Metallic taste in mouth, fever/chills,  cough, N/V, facial pain, changes in vision.           PAST MEDICAL & SURGICAL HISTORY:  HLD (hyperlipidemia)      Chronic back pain        Allergies    No Known Allergies    Intolerances      MEDICATIONS  (STANDING):  amoxicillin  875 milliGRAM(s)/clavulanate 1 Tablet(s) Oral two times a day  atorvastatin 40 milliGRAM(s) Oral at bedtime  chlorhexidine 4% Liquid 1 Application(s) Topical daily  enoxaparin Injectable 40 milliGRAM(s) SubCutaneous <User Schedule>  polyethylene glycol 3350 17 Gram(s) Oral daily  senna 1 Tablet(s) Oral two times a day  sodium chloride 0.65% Nasal 1 Spray(s) Both Nostrils three times a day  sodium chloride 0.9%. 1000 milliLiter(s) (75 mL/Hr) IV Continuous <Continuous>  topiramate 25 milliGRAM(s) Oral daily    MEDICATIONS  (PRN):  acetaminophen     Tablet .. 650 milliGRAM(s) Oral every 6 hours PRN Temp greater or equal to 38C (100.4F), Mild Pain (1 - 3)  bisacodyl 5 milliGRAM(s) Oral every 12 hours PRN Constipation  bisacodyl Suppository 10 milliGRAM(s) Rectal daily PRN Constipation  cyclobenzaprine 10 milliGRAM(s) Oral three times a day PRN Muscle Spasm  HYDROmorphone  Injectable 0.5 milliGRAM(s) IV Push every 4 hours PRN breakthrough pain  melatonin 5 milliGRAM(s) Oral at bedtime PRN Insomnia  ondansetron Injectable 4 milliGRAM(s) IV Push once PRN Nausea and/or Vomiting  oxyCODONE    IR 5 milliGRAM(s) Oral every 4 hours PRN Moderate Pain (4 - 6)  saline laxative (FLEET) Rectal Enema 1 Enema Rectal once PRN constipation  traMADol 50 milliGRAM(s) Oral every 6 hours PRN Severe Pain (7 - 10)      Social History: see consult    Family history: see consult    ROS:   ENT: all negative except as noted in HPI   Pulm: denies SOB, cough, hemoptysis  Neuro: denies numbness/tingling, loss of sensation  Endo: denies heat/cold intolerance, excessive sweating      Vital Signs Last 24 Hrs  T(C): 37.5 (03 Jul 2022 11:00), Max: 37.5 (03 Jul 2022 11:00)  T(F): 99.5 (03 Jul 2022 11:00), Max: 99.5 (03 Jul 2022 11:00)  HR: 91 (03 Jul 2022 11:00) (78 - 98)  BP: 141/73 (03 Jul 2022 11:00) (128/78 - 146/82)  BP(mean): 93 (03 Jul 2022 11:00) (88 - 99)  RR: 16 (03 Jul 2022 11:00) (15 - 23)  SpO2: 97% (03 Jul 2022 11:00) (94% - 98%)                          12.8   12.46 )-----------( 373      ( 03 Jul 2022 00:19 )             40.4    07-03    137  |  99  |  4<L>  ----------------------------<  84  4.2   |  22  |  0.56    Ca    9.1      03 Jul 2022 00:23  Phos  2.4     07-03  Mg     1.9     07-03         PHYSICAL EXAM:  Gen: NAD, On Humidified O2.   Skin: No rashes, bruises, or lesions  Head: Normocephalic, Atraumatic  Face: no edema, erythema, or fluctuance. Parotid glands soft without mass  Eyes: no scleral injection  Nose: serosang secretions in from Left> right Nare.  No CSF leak noted on exam.   Mouth: No Stridor / Drooling / Trismus.  Mucosa moist, tongue/uvula midline, crusted blood in posterior oropharynx.  Neck: Flat, supple, no lymphadenopathy, trachea midline, no masses  Lymphatic: No lymphadenopathy  Resp: On Humidified O2.   Neuro: facial nerve intact, no facial droop.

## 2022-07-03 NOTE — PROGRESS NOTE ADULT - SUBJECTIVE AND OBJECTIVE BOX
EVENTS:   No acute events overnight.    VITALS:  T(C): , Max: 37.5 (07-03-22 @ 11:00)  HR:  (78 - 101)  BP:  (128/78 - 147/87)  ABP: --  RR:  (15 - 23)  SpO2:  (94% - 98%)  Wt(kg): --      07-02-22 @ 07:01  -  07-03-22 @ 07:00  --------------------------------------------------------  IN: 2085 mL / OUT: 2070 mL / NET: 15 mL    07-03-22 @ 07:01  -  07-03-22 @ 19:57  --------------------------------------------------------  IN: 1140 mL / OUT: 2060 mL / NET: -920 mL      LABS:  Na: 137 (07-03 @ 00:23), 134 (07-01 @ 20:40), 135 (07-01 @ 01:11)  K: 4.2 (07-03 @ 00:23), 4.7 (07-01 @ 20:40), 4.6 (07-01 @ 01:11)  Cl: 99 (07-03 @ 00:23), 99 (07-01 @ 20:40), 100 (07-01 @ 01:11)  CO2: 22 (07-03 @ 00:23), 26 (07-01 @ 20:40), 24 (07-01 @ 01:11)  BUN: 4 (07-03 @ 00:23), 5 (07-01 @ 20:40), 6 (07-01 @ 01:11)  Cr: 0.56 (07-03 @ 00:23), 0.60 (07-01 @ 20:40), 0.62 (07-01 @ 01:11)  Glu: 84(07-03 @ 00:23), 85(07-01 @ 20:40), 93(07-01 @ 01:11)    Hgb: 12.8 (07-03 @ 00:19), 11.6 (07-01 @ 20:40), 11.6 (07-01 @ 01:11)  Hct: 40.4 (07-03 @ 00:19), 36.0 (07-01 @ 20:40), 37.0 (07-01 @ 01:11)  WBC: 12.46 (07-03 @ 00:19), 13.73 (07-01 @ 20:40), 16.54 (07-01 @ 01:11)  Plt: 373 (07-03 @ 00:19), 334 (07-01 @ 20:40), 372 (07-01 @ 01:11)    MEDICATIONS:  acetaminophen     Tablet .. 650 milliGRAM(s) Oral every 6 hours PRN  atorvastatin 40 milliGRAM(s) Oral at bedtime  bisacodyl 5 milliGRAM(s) Oral every 12 hours PRN  bisacodyl Suppository 10 milliGRAM(s) Rectal daily PRN  chlorhexidine 4% Liquid 1 Application(s) Topical daily  cyclobenzaprine 10 milliGRAM(s) Oral three times a day PRN  enoxaparin Injectable 40 milliGRAM(s) SubCutaneous <User Schedule>  HYDROmorphone  Injectable 0.5 milliGRAM(s) IV Push every 4 hours PRN  melatonin 5 milliGRAM(s) Oral at bedtime PRN  ondansetron Injectable 4 milliGRAM(s) IV Push once PRN  oxyCODONE    IR 5 milliGRAM(s) Oral every 4 hours PRN  polyethylene glycol 3350 17 Gram(s) Oral daily  saline laxative (FLEET) Rectal Enema 1 Enema Rectal once PRN  senna 1 Tablet(s) Oral two times a day  sodium chloride 0.65% Nasal 1 Spray(s) Both Nostrils three times a day  sodium chloride 0.9%. 1000 milliLiter(s) IV Continuous <Continuous>  topiramate 25 milliGRAM(s) Oral daily  traMADol 50 milliGRAM(s) Oral every 6 hours PRN    EXAMINATION:  General:  in NAD  HEENT:  MMM  Neuro:  awake, alert, oriented x 3, follows commands, EOMI, face symmetric, no PD, DF 5/5   Cards:  RRR  Respiratory:  no respiratory distress  Abdomen:  soft  Extremities:  no LE edema    Assessment/Plan:   47 yo woman presents with CSF leak post covid swab and enterovirus meningitis treated at [facility], now s/p endoscopic endonasal repair of CSF leak with lumbar drain.    lumbar drain EVENTS:   No acute events overnight.  Reports a mild HA which is worse when laying flat. Denies salty taste in her mouth or leaking from her nose.     VITALS:  T(C): , Max: 37.5 (07-03-22 @ 11:00)  HR:  (78 - 101)  BP:  (128/78 - 147/87)  ABP: --  RR:  (15 - 23)  SpO2:  (94% - 98%)  Wt(kg): --      07-02-22 @ 07:01  -  07-03-22 @ 07:00  --------------------------------------------------------  IN: 2085 mL / OUT: 2070 mL / NET: 15 mL    07-03-22 @ 07:01  -  07-03-22 @ 19:57  --------------------------------------------------------  IN: 1140 mL / OUT: 2060 mL / NET: -920 mL      LABS:  Na: 137 (07-03 @ 00:23), 134 (07-01 @ 20:40), 135 (07-01 @ 01:11)  K: 4.2 (07-03 @ 00:23), 4.7 (07-01 @ 20:40), 4.6 (07-01 @ 01:11)  Cl: 99 (07-03 @ 00:23), 99 (07-01 @ 20:40), 100 (07-01 @ 01:11)  CO2: 22 (07-03 @ 00:23), 26 (07-01 @ 20:40), 24 (07-01 @ 01:11)  BUN: 4 (07-03 @ 00:23), 5 (07-01 @ 20:40), 6 (07-01 @ 01:11)  Cr: 0.56 (07-03 @ 00:23), 0.60 (07-01 @ 20:40), 0.62 (07-01 @ 01:11)  Glu: 84(07-03 @ 00:23), 85(07-01 @ 20:40), 93(07-01 @ 01:11)    Hgb: 12.8 (07-03 @ 00:19), 11.6 (07-01 @ 20:40), 11.6 (07-01 @ 01:11)  Hct: 40.4 (07-03 @ 00:19), 36.0 (07-01 @ 20:40), 37.0 (07-01 @ 01:11)  WBC: 12.46 (07-03 @ 00:19), 13.73 (07-01 @ 20:40), 16.54 (07-01 @ 01:11)  Plt: 373 (07-03 @ 00:19), 334 (07-01 @ 20:40), 372 (07-01 @ 01:11)    MEDICATIONS:  acetaminophen     Tablet .. 650 milliGRAM(s) Oral every 6 hours PRN  atorvastatin 40 milliGRAM(s) Oral at bedtime  bisacodyl 5 milliGRAM(s) Oral every 12 hours PRN  bisacodyl Suppository 10 milliGRAM(s) Rectal daily PRN  chlorhexidine 4% Liquid 1 Application(s) Topical daily  cyclobenzaprine 10 milliGRAM(s) Oral three times a day PRN  enoxaparin Injectable 40 milliGRAM(s) SubCutaneous <User Schedule>  HYDROmorphone  Injectable 0.5 milliGRAM(s) IV Push every 4 hours PRN  melatonin 5 milliGRAM(s) Oral at bedtime PRN  ondansetron Injectable 4 milliGRAM(s) IV Push once PRN  oxyCODONE    IR 5 milliGRAM(s) Oral every 4 hours PRN  polyethylene glycol 3350 17 Gram(s) Oral daily  saline laxative (FLEET) Rectal Enema 1 Enema Rectal once PRN  senna 1 Tablet(s) Oral two times a day  sodium chloride 0.65% Nasal 1 Spray(s) Both Nostrils three times a day  sodium chloride 0.9%. 1000 milliLiter(s) IV Continuous <Continuous>  topiramate 25 milliGRAM(s) Oral daily  traMADol 50 milliGRAM(s) Oral every 6 hours PRN    EXAMINATION:  General:  in NAD  HEENT:  MMM  Neuro:  awake, alert, oriented to hospital and month, follows commands, EOMI, face symmetric, no PD, no LE drift, HF 5/5  Cards:  RRR  Respiratory:  no respiratory distress  Abdomen:  soft  Extremities:  no LE edema    Assessment/Plan:   45 yo woman presents with CSF leak post covid swab and enterovirus meningitis treated at [facility], now s/p endoscopic endonasal repair of CSF leak with lumbar drain. CTH 7/1 with a very small interhemispheric SDH, stable on subsequent CT.     No change to plan from daytime.  LD draining 5cc/hr  regular diet, on NS at 75cc/hr for poor intake   No BM since admission but patient previously declining bowel regimen, now taking Miralax and suppository   Lov ppx    Rosaura Cameron  Neurocritical Care Attending

## 2022-07-03 NOTE — PROGRESS NOTE ADULT - SUBJECTIVE AND OBJECTIVE BOX
SUMMARY:  46 year-old woman with hyperlipidemia and chronic low back pain underwent cerebrospinal fluid leak repair on 6/29/22 with pre-op lumbar drain placement.     24 HOUR EVENTS:  LD dropped to 5 cc/hr. CTH stable    VITALS/DATA/ORDERS: [x] Reviewed  LD @ 5cc/h    EXAMINATION:   General: Calm  HEENT: Anicteric sclerae  Cardiac: [id number]  Lungs: Clear, no wheezes, rhonchi.   Abdomen: Soft, non-tender, +BS.   Extremities: No c/c/e  Skin/Incision Site: Clean, dry and intact  Neurologic: Awake, alert, fully oriented, follows commands, PERRL, EOMI, face symmetric, tongue midline, no drift, full strength (RUE pain limited but able to give full strength).

## 2022-07-04 LAB
ANION GAP SERPL CALC-SCNC: 16 MMOL/L — SIGNIFICANT CHANGE UP (ref 5–17)
BUN SERPL-MCNC: 6 MG/DL — LOW (ref 7–23)
CALCIUM SERPL-MCNC: 9.1 MG/DL — SIGNIFICANT CHANGE UP (ref 8.4–10.5)
CHLORIDE SERPL-SCNC: 104 MMOL/L — SIGNIFICANT CHANGE UP (ref 96–108)
CO2 SERPL-SCNC: 21 MMOL/L — LOW (ref 22–31)
CREAT SERPL-MCNC: 0.59 MG/DL — SIGNIFICANT CHANGE UP (ref 0.5–1.3)
EGFR: 111 ML/MIN/1.73M2 — SIGNIFICANT CHANGE UP
GLUCOSE SERPL-MCNC: 76 MG/DL — SIGNIFICANT CHANGE UP (ref 70–99)
HCT VFR BLD CALC: 36.7 % — SIGNIFICANT CHANGE UP (ref 34.5–45)
HGB BLD-MCNC: 11.9 G/DL — SIGNIFICANT CHANGE UP (ref 11.5–15.5)
MAGNESIUM SERPL-MCNC: 1.8 MG/DL — SIGNIFICANT CHANGE UP (ref 1.6–2.6)
MCHC RBC-ENTMCNC: 27.2 PG — SIGNIFICANT CHANGE UP (ref 27–34)
MCHC RBC-ENTMCNC: 32.4 GM/DL — SIGNIFICANT CHANGE UP (ref 32–36)
MCV RBC AUTO: 84 FL — SIGNIFICANT CHANGE UP (ref 80–100)
NRBC # BLD: 0 /100 WBCS — SIGNIFICANT CHANGE UP (ref 0–0)
PHOSPHATE SERPL-MCNC: 2.5 MG/DL — SIGNIFICANT CHANGE UP (ref 2.5–4.5)
PLATELET # BLD AUTO: 401 K/UL — HIGH (ref 150–400)
POTASSIUM SERPL-MCNC: 3.7 MMOL/L — SIGNIFICANT CHANGE UP (ref 3.5–5.3)
POTASSIUM SERPL-SCNC: 3.7 MMOL/L — SIGNIFICANT CHANGE UP (ref 3.5–5.3)
RBC # BLD: 4.37 M/UL — SIGNIFICANT CHANGE UP (ref 3.8–5.2)
RBC # FLD: 13.1 % — SIGNIFICANT CHANGE UP (ref 10.3–14.5)
SODIUM SERPL-SCNC: 141 MMOL/L — SIGNIFICANT CHANGE UP (ref 135–145)
WBC # BLD: 9.19 K/UL — SIGNIFICANT CHANGE UP (ref 3.8–10.5)
WBC # FLD AUTO: 9.19 K/UL — SIGNIFICANT CHANGE UP (ref 3.8–10.5)

## 2022-07-04 PROCEDURE — 99233 SBSQ HOSP IP/OBS HIGH 50: CPT

## 2022-07-04 PROCEDURE — 99231 SBSQ HOSP IP/OBS SF/LOW 25: CPT

## 2022-07-04 RX ORDER — POTASSIUM CHLORIDE 20 MEQ
40 PACKET (EA) ORAL ONCE
Refills: 0 | Status: COMPLETED | OUTPATIENT
Start: 2022-07-04 | End: 2022-07-04

## 2022-07-04 RX ORDER — MAGNESIUM SULFATE 500 MG/ML
2 VIAL (ML) INJECTION ONCE
Refills: 0 | Status: COMPLETED | OUTPATIENT
Start: 2022-07-04 | End: 2022-07-04

## 2022-07-04 RX ADMIN — Medication 40 MILLIEQUIVALENT(S): at 20:27

## 2022-07-04 RX ADMIN — Medication 1 SPRAY(S): at 05:06

## 2022-07-04 RX ADMIN — CHLORHEXIDINE GLUCONATE 1 APPLICATION(S): 213 SOLUTION TOPICAL at 21:03

## 2022-07-04 RX ADMIN — SENNA PLUS 1 TABLET(S): 8.6 TABLET ORAL at 17:06

## 2022-07-04 RX ADMIN — OXYCODONE HYDROCHLORIDE 5 MILLIGRAM(S): 5 TABLET ORAL at 23:00

## 2022-07-04 RX ADMIN — Medication 25 MILLIGRAM(S): at 11:23

## 2022-07-04 RX ADMIN — ATORVASTATIN CALCIUM 40 MILLIGRAM(S): 80 TABLET, FILM COATED ORAL at 21:03

## 2022-07-04 RX ADMIN — SENNA PLUS 1 TABLET(S): 8.6 TABLET ORAL at 05:06

## 2022-07-04 RX ADMIN — OXYCODONE HYDROCHLORIDE 5 MILLIGRAM(S): 5 TABLET ORAL at 23:30

## 2022-07-04 RX ADMIN — Medication 1 SPRAY(S): at 21:03

## 2022-07-04 RX ADMIN — Medication 25 GRAM(S): at 20:27

## 2022-07-04 RX ADMIN — Medication 63.75 MILLIMOLE(S): at 00:27

## 2022-07-04 RX ADMIN — ENOXAPARIN SODIUM 40 MILLIGRAM(S): 100 INJECTION SUBCUTANEOUS at 17:06

## 2022-07-04 RX ADMIN — Medication 1 SPRAY(S): at 16:27

## 2022-07-04 NOTE — PROGRESS NOTE ADULT - ASSESSMENT
45 yo woman presents with CSF leak post covid swab and enterovirus meningitis treated at [facility], now s/p endoscopic endonasal repair of CSF leak with lumbar drain. CTH 7/1 with a very small interhemispheric SDH, stable on subsequent CT.     No change to plan from daytime.  LD draining 5cc/hr  regular diet, on NS at 75cc/hr for poor intake   No BM since admission but patient previously declining bowel regimen, now taking Miralax and suppository   Lov ppx

## 2022-07-04 NOTE — PROGRESS NOTE ADULT - SUBJECTIVE AND OBJECTIVE BOX
EVENTS:   No acute events overnight.    VITALS:  T(C): , Max: 37.4 (07-03-22 @ 19:00)  HR:  (77 - 101)  BP:  (135/69 - 155/85)  ABP: --  RR:  (16 - 21)  SpO2:  (93% - 98%)  Wt(kg): --      07-03-22 @ 07:01  -  07-04-22 @ 07:00  --------------------------------------------------------  IN: 3315 mL / OUT: 3820 mL / NET: -505 mL    07-04-22 @ 07:01  -  07-04-22 @ 18:46  --------------------------------------------------------  IN: 225 mL / OUT: 1660 mL / NET: -1435 mL      LABS:  Na: 141 (07-04 @ 16:54), 132 (07-03 @ 22:06), 144 (07-03 @ 20:18), 137 (07-03 @ 00:23), 134 (07-01 @ 20:40)  K: 3.7 (07-04 @ 16:54), 4.3 (07-03 @ 22:06), 2.2 (07-03 @ 20:18), 4.2 (07-03 @ 00:23), 4.7 (07-01 @ 20:40)  Cl: 104 (07-04 @ 16:54), 100 (07-03 @ 22:06), 123 (07-03 @ 20:18), 99 (07-03 @ 00:23), 99 (07-01 @ 20:40)  CO2: 21 (07-04 @ 16:54), 20 (07-03 @ 22:06), 13 (07-03 @ 20:18), 22 (07-03 @ 00:23), 26 (07-01 @ 20:40)  BUN: 6 (07-04 @ 16:54), 5 (07-03 @ 22:06), <4 (07-03 @ 20:18), 4 (07-03 @ 00:23), 5 (07-01 @ 20:40)  Cr: 0.59 (07-04 @ 16:54), 0.54 (07-03 @ 22:06), <0.30 (07-03 @ 20:18), 0.56 (07-03 @ 00:23), 0.60 (07-01 @ 20:40)  Glu: 76(07-04 @ 16:54), 88(07-03 @ 22:06), 54(07-03 @ 20:18), 84(07-03 @ 00:23), 85(07-01 @ 20:40)    Hgb: 11.9 (07-04 @ 12:53), 12.8 (07-03 @ 00:19), 11.6 (07-01 @ 20:40)  Hct: 36.7 (07-04 @ 12:53), 40.4 (07-03 @ 00:19), 36.0 (07-01 @ 20:40)  WBC: 9.19 (07-04 @ 12:53), 12.46 (07-03 @ 00:19), 13.73 (07-01 @ 20:40)  Plt: 401 (07-04 @ 12:53), 373 (07-03 @ 00:19), 334 (07-01 @ 20:40)    MEDICATIONS:  acetaminophen     Tablet .. 650 milliGRAM(s) Oral every 6 hours PRN  atorvastatin 40 milliGRAM(s) Oral at bedtime  bisacodyl 5 milliGRAM(s) Oral every 12 hours PRN  bisacodyl Suppository 10 milliGRAM(s) Rectal daily PRN  chlorhexidine 4% Liquid 1 Application(s) Topical daily  cyclobenzaprine 10 milliGRAM(s) Oral three times a day PRN  enoxaparin Injectable 40 milliGRAM(s) SubCutaneous <User Schedule>  HYDROmorphone  Injectable 0.5 milliGRAM(s) IV Push every 4 hours PRN  melatonin 5 milliGRAM(s) Oral at bedtime PRN  ondansetron Injectable 4 milliGRAM(s) IV Push once PRN  oxyCODONE    IR 5 milliGRAM(s) Oral every 4 hours PRN  polyethylene glycol 3350 17 Gram(s) Oral daily  saline laxative (FLEET) Rectal Enema 1 Enema Rectal once PRN  senna 1 Tablet(s) Oral two times a day  sodium chloride 0.65% Nasal 1 Spray(s) Both Nostrils three times a day  topiramate 25 milliGRAM(s) Oral daily  traMADol 50 milliGRAM(s) Oral every 6 hours PRN    EXAMINATION:  General:  in NAD  HEENT:  MMM  Neuro:  awake, alert, oriented to hospital and month, follows commands, EOMI, face symmetric, no PD, no LE drift, HF 5/5  Cards:  RRR  Respiratory:  no respiratory distress  Abdomen:  soft  Extremities:  no LE edema    Assessment/Plan:   45 yo woman presents with CSF leak post covid swab and enterovirus meningitis treated at [facility], now s/p endoscopic endonasal repair of CSF leak with lumbar drain. CTH 7/1 with a very small interhemispheric SDH, stable on subsequent CT.     No change to plan from daytime.  LD draining 5cc/hr  regular diet, on NS at 75cc/hr for poor intake   No BM since admission but patient previously declining bowel regimen, now taking Miralax and suppository   Lov ppx    Rosaura Cameron  Neurocritical Care Attending EVENTS:   No acute events overnight.  LD clamped today.     VITALS:  T(C): , Max: 37.4 (07-03-22 @ 19:00)  HR:  (77 - 101)  BP:  (135/69 - 155/85)  ABP: --  RR:  (16 - 21)  SpO2:  (93% - 98%)  Wt(kg): --      07-03-22 @ 07:01  -  07-04-22 @ 07:00  --------------------------------------------------------  IN: 3315 mL / OUT: 3820 mL / NET: -505 mL    07-04-22 @ 07:01  -  07-04-22 @ 18:46  --------------------------------------------------------  IN: 225 mL / OUT: 1660 mL / NET: -1435 mL      LABS:  Na: 141 (07-04 @ 16:54), 132 (07-03 @ 22:06), 144 (07-03 @ 20:18), 137 (07-03 @ 00:23), 134 (07-01 @ 20:40)  K: 3.7 (07-04 @ 16:54), 4.3 (07-03 @ 22:06), 2.2 (07-03 @ 20:18), 4.2 (07-03 @ 00:23), 4.7 (07-01 @ 20:40)  Cl: 104 (07-04 @ 16:54), 100 (07-03 @ 22:06), 123 (07-03 @ 20:18), 99 (07-03 @ 00:23), 99 (07-01 @ 20:40)  CO2: 21 (07-04 @ 16:54), 20 (07-03 @ 22:06), 13 (07-03 @ 20:18), 22 (07-03 @ 00:23), 26 (07-01 @ 20:40)  BUN: 6 (07-04 @ 16:54), 5 (07-03 @ 22:06), <4 (07-03 @ 20:18), 4 (07-03 @ 00:23), 5 (07-01 @ 20:40)  Cr: 0.59 (07-04 @ 16:54), 0.54 (07-03 @ 22:06), <0.30 (07-03 @ 20:18), 0.56 (07-03 @ 00:23), 0.60 (07-01 @ 20:40)  Glu: 76(07-04 @ 16:54), 88(07-03 @ 22:06), 54(07-03 @ 20:18), 84(07-03 @ 00:23), 85(07-01 @ 20:40)    Hgb: 11.9 (07-04 @ 12:53), 12.8 (07-03 @ 00:19), 11.6 (07-01 @ 20:40)  Hct: 36.7 (07-04 @ 12:53), 40.4 (07-03 @ 00:19), 36.0 (07-01 @ 20:40)  WBC: 9.19 (07-04 @ 12:53), 12.46 (07-03 @ 00:19), 13.73 (07-01 @ 20:40)  Plt: 401 (07-04 @ 12:53), 373 (07-03 @ 00:19), 334 (07-01 @ 20:40)    MEDICATIONS:  acetaminophen     Tablet .. 650 milliGRAM(s) Oral every 6 hours PRN  atorvastatin 40 milliGRAM(s) Oral at bedtime  bisacodyl 5 milliGRAM(s) Oral every 12 hours PRN  bisacodyl Suppository 10 milliGRAM(s) Rectal daily PRN  chlorhexidine 4% Liquid 1 Application(s) Topical daily  cyclobenzaprine 10 milliGRAM(s) Oral three times a day PRN  enoxaparin Injectable 40 milliGRAM(s) SubCutaneous <User Schedule>  HYDROmorphone  Injectable 0.5 milliGRAM(s) IV Push every 4 hours PRN  melatonin 5 milliGRAM(s) Oral at bedtime PRN  ondansetron Injectable 4 milliGRAM(s) IV Push once PRN  oxyCODONE    IR 5 milliGRAM(s) Oral every 4 hours PRN  polyethylene glycol 3350 17 Gram(s) Oral daily  saline laxative (FLEET) Rectal Enema 1 Enema Rectal once PRN  senna 1 Tablet(s) Oral two times a day  sodium chloride 0.65% Nasal 1 Spray(s) Both Nostrils three times a day  topiramate 25 milliGRAM(s) Oral daily  traMADol 50 milliGRAM(s) Oral every 6 hours PRN    EXAMINATION:  General:  in NAD  HEENT:  MMM  Neuro:  awake, alert, oriented to hospital and month, follows commands, EOMI, face symmetric, no PD, no LE drift, HF 5/5  Cards:  RRR  Respiratory:  no respiratory distress  Abdomen:  soft  Extremities:  no LE edema    Assessment/Plan:   45 yo woman presents with CSF leak post covid swab and enterovirus meningitis treated at [facility], now s/p endoscopic endonasal repair of CSF leak with lumbar drain. CTH 7/1 with a very small interhemispheric SDH, stable on subsequent CT.     No change to plan from daytime.  LD draining 5cc/hr  regular diet, on NS at 75cc/hr for poor intake   No BM since admission but patient previously declining bowel regimen, now taking Miralax and suppository   Lov ppx    Rosaura Cameron  Neurocritical Care Attending EVENTS:   No acute events overnight.  LD draining 5cc/hr.   With decreased pain requirements.   Denies any symptoms of CSF leak. Reports only a mild HA which is not worsening.     VITALS:  T(C): , Max: 37.4 (07-03-22 @ 19:00)  HR:  (77 - 101)  BP:  (135/69 - 155/85)  ABP: --  RR:  (16 - 21)  SpO2:  (93% - 98%)  Wt(kg): --      07-03-22 @ 07:01  -  07-04-22 @ 07:00  --------------------------------------------------------  IN: 3315 mL / OUT: 3820 mL / NET: -505 mL    07-04-22 @ 07:01  -  07-04-22 @ 18:46  --------------------------------------------------------  IN: 225 mL / OUT: 1660 mL / NET: -1435 mL      LABS:  Na: 141 (07-04 @ 16:54), 132 (07-03 @ 22:06), 144 (07-03 @ 20:18), 137 (07-03 @ 00:23), 134 (07-01 @ 20:40)  K: 3.7 (07-04 @ 16:54), 4.3 (07-03 @ 22:06), 2.2 (07-03 @ 20:18), 4.2 (07-03 @ 00:23), 4.7 (07-01 @ 20:40)  Cl: 104 (07-04 @ 16:54), 100 (07-03 @ 22:06), 123 (07-03 @ 20:18), 99 (07-03 @ 00:23), 99 (07-01 @ 20:40)  CO2: 21 (07-04 @ 16:54), 20 (07-03 @ 22:06), 13 (07-03 @ 20:18), 22 (07-03 @ 00:23), 26 (07-01 @ 20:40)  BUN: 6 (07-04 @ 16:54), 5 (07-03 @ 22:06), <4 (07-03 @ 20:18), 4 (07-03 @ 00:23), 5 (07-01 @ 20:40)  Cr: 0.59 (07-04 @ 16:54), 0.54 (07-03 @ 22:06), <0.30 (07-03 @ 20:18), 0.56 (07-03 @ 00:23), 0.60 (07-01 @ 20:40)  Glu: 76(07-04 @ 16:54), 88(07-03 @ 22:06), 54(07-03 @ 20:18), 84(07-03 @ 00:23), 85(07-01 @ 20:40)    Hgb: 11.9 (07-04 @ 12:53), 12.8 (07-03 @ 00:19), 11.6 (07-01 @ 20:40)  Hct: 36.7 (07-04 @ 12:53), 40.4 (07-03 @ 00:19), 36.0 (07-01 @ 20:40)  WBC: 9.19 (07-04 @ 12:53), 12.46 (07-03 @ 00:19), 13.73 (07-01 @ 20:40)  Plt: 401 (07-04 @ 12:53), 373 (07-03 @ 00:19), 334 (07-01 @ 20:40)    MEDICATIONS:  acetaminophen     Tablet .. 650 milliGRAM(s) Oral every 6 hours PRN  atorvastatin 40 milliGRAM(s) Oral at bedtime  bisacodyl 5 milliGRAM(s) Oral every 12 hours PRN  bisacodyl Suppository 10 milliGRAM(s) Rectal daily PRN  chlorhexidine 4% Liquid 1 Application(s) Topical daily  cyclobenzaprine 10 milliGRAM(s) Oral three times a day PRN  enoxaparin Injectable 40 milliGRAM(s) SubCutaneous <User Schedule>  HYDROmorphone  Injectable 0.5 milliGRAM(s) IV Push every 4 hours PRN  melatonin 5 milliGRAM(s) Oral at bedtime PRN  ondansetron Injectable 4 milliGRAM(s) IV Push once PRN  oxyCODONE    IR 5 milliGRAM(s) Oral every 4 hours PRN  polyethylene glycol 3350 17 Gram(s) Oral daily  saline laxative (FLEET) Rectal Enema 1 Enema Rectal once PRN  senna 1 Tablet(s) Oral two times a day  sodium chloride 0.65% Nasal 1 Spray(s) Both Nostrils three times a day  topiramate 25 milliGRAM(s) Oral daily  traMADol 50 milliGRAM(s) Oral every 6 hours PRN    EXAMINATION:  General:  in NAD  HEENT:  MMM  Neuro:  awake, alert, oriented to hospital and month, follows commands, EOMI, face symmetric, no PD, no LE drift, HF 5/5  Cards:  RRR  Respiratory:  no respiratory distress  Abdomen:  soft  Extremities:  no LE edema    Assessment/Plan:   47 yo woman presents with CSF leak post covid swab and enterovirus meningitis treated at [facility], now s/p endoscopic endonasal repair of CSF leak with lumbar drain. CTH 7/1 with a very small interhemispheric SDH, stable on subsequent CT.     No change to plan from daytime.  LD draining 5cc/hr  regular diet  LBM 7/3 and 7/4  Lov ppx    Rosaura Cameron  Neurocritical Care Attending

## 2022-07-04 NOTE — PROGRESS NOTE ADULT - ASSESSMENT
47 YO F with CSF leak from L ethmoid sinus S/P Endonasal CSF Leak repair POD #4. CSF leak in OR, repaired and LD placed. C/o frontal HA, 8/10 pain, relieved with pain meds. No CSF Leak noted on exam.

## 2022-07-04 NOTE — PROGRESS NOTE ADULT - SUBJECTIVE AND OBJECTIVE BOX
Patient seen and examined at bedside.    --Anticoagulation--  enoxaparin Injectable 40 milliGRAM(s) SubCutaneous <User Schedule>    T(C): 37.3 (07-03-22 @ 23:00), Max: 37.5 (07-03-22 @ 11:00)  HR: 94 (07-03-22 @ 23:00) (78 - 101)  BP: 142/77 (07-03-22 @ 23:00) (128/78 - 147/87)  RR: 20 (07-03-22 @ 23:00) (15 - 20)  SpO2: 93% (07-03-22 @ 23:00) (93% - 98%)  Wt(kg): --    Exam:  AOx3, FC, PERRL, EOMI, no facial, 5/5 throughout, no drift

## 2022-07-04 NOTE — PROGRESS NOTE ADULT - SUBJECTIVE AND OBJECTIVE BOX
HPI:  46F p/w CSF leak L ethmoid sinus s/p covid swab on 5/28, over past several wks while traveling w/ increaing flow of rhinorrhea, eventually developed sxs cold and meningitis (HA/nausea/cough/gen weakness), adm [facility] on 6/20 febrile to 102, CSF PCR pos enterovirus, ID kept on cefepime. Exam: +rhinorrhea from L nare, otherwise intact, no HA, AVSS.  (28 Jun 2022 23:02)    SURGERY: Repair, CSF leak, cranial      PAST MEDICAL HISTORY: HLD (hyperlipidemia)    Chronic back pain      PAST SURGICAL HISTORY:   FAMILY HISTORY:    ALLERGIES: No Known Allergies    **************************************  **************************************    OVERNIGHT EVENTS: [] None    ROS  Unobtainable due to mental status[] Negative []  Positives:    ADMISSION SCORES: GCS: HH: MF: NIHSS: RASS: CAM-ICU: ICP:    ICU Vital Signs Last 24 Hrs  T(C): 36.5 (04 Jul 2022 11:00), Max: 37.4 (03 Jul 2022 15:00)  T(F): 97.7 (04 Jul 2022 11:00), Max: 99.3 (03 Jul 2022 15:00)  HR: 96 (04 Jul 2022 11:00) (77 - 101)  BP: 155/85 (04 Jul 2022 11:00) (140/74 - 155/85)  BP(mean): 103 (04 Jul 2022 11:00) (93 - 106)  ABP: --  ABP(mean): --  RR: 17 (04 Jul 2022 11:00) (15 - 21)  SpO2: 94% (04 Jul 2022 11:00) (93% - 98%)     07-03 @ 07:01  -  07-04 @ 07:00  --------------------------------------------------------  IN: 3315 mL / OUT: 3820 mL / NET: -505 mL    07-04 @ 07:01  -  07-04 @ 12:58  --------------------------------------------------------  IN: 225 mL / OUT: 420 mL / NET: -195 mL           DEVICES: [] Restraints [] NATA/HMV []LD [] ET tube [] Trach [] Chest Tube [] A-line [] Thorne [] NGT [] Rectal Tube [] EVD [] CVL  [] ICP/LiCOx    NEUROIMAGING:     EEG REPORT:     MEDICATIONS:  acetaminophen     Tablet .. 650 milliGRAM(s) Oral every 6 hours PRN  atorvastatin 40 milliGRAM(s) Oral at bedtime  bisacodyl 5 milliGRAM(s) Oral every 12 hours PRN  bisacodyl Suppository 10 milliGRAM(s) Rectal daily PRN  chlorhexidine 4% Liquid 1 Application(s) Topical daily  cyclobenzaprine 10 milliGRAM(s) Oral three times a day PRN  enoxaparin Injectable 40 milliGRAM(s) SubCutaneous <User Schedule>  HYDROmorphone  Injectable 0.5 milliGRAM(s) IV Push every 4 hours PRN  melatonin 5 milliGRAM(s) Oral at bedtime PRN  ondansetron Injectable 4 milliGRAM(s) IV Push once PRN  oxyCODONE    IR 5 milliGRAM(s) Oral every 4 hours PRN  polyethylene glycol 3350 17 Gram(s) Oral daily  saline laxative (FLEET) Rectal Enema 1 Enema Rectal once PRN  senna 1 Tablet(s) Oral two times a day  sodium chloride 0.65% Nasal 1 Spray(s) Both Nostrils three times a day  topiramate 25 milliGRAM(s) Oral daily  traMADol 50 milliGRAM(s) Oral every 6 hours PRN      PHYSICAL EXAM:  Neuro Intact      LABS:                        12.8   12.46 )-----------( 373      ( 03 Jul 2022 00:19 )             40.4    07-03    132<L>  |  100  |  5<L>  ----------------------------<  88  4.3   |  20<L>  |  0.54    Ca    9.0      03 Jul 2022 22:06  Phos  2.5     07-03  Mg     1.7     07-03

## 2022-07-04 NOTE — PROGRESS NOTE ADULT - SUBJECTIVE AND OBJECTIVE BOX
ENT ISSUE/POD:    HPI:         PAST MEDICAL & SURGICAL HISTORY:  HLD (hyperlipidemia)      Chronic back pain        Allergies    No Known Allergies    Intolerances      MEDICATIONS  (STANDING):  atorvastatin 40 milliGRAM(s) Oral at bedtime  chlorhexidine 4% Liquid 1 Application(s) Topical daily  enoxaparin Injectable 40 milliGRAM(s) SubCutaneous <User Schedule>  polyethylene glycol 3350 17 Gram(s) Oral daily  senna 1 Tablet(s) Oral two times a day  sodium chloride 0.65% Nasal 1 Spray(s) Both Nostrils three times a day  topiramate 25 milliGRAM(s) Oral daily    MEDICATIONS  (PRN):  acetaminophen     Tablet .. 650 milliGRAM(s) Oral every 6 hours PRN Temp greater or equal to 38C (100.4F), Mild Pain (1 - 3)  bisacodyl 5 milliGRAM(s) Oral every 12 hours PRN Constipation  bisacodyl Suppository 10 milliGRAM(s) Rectal daily PRN Constipation  cyclobenzaprine 10 milliGRAM(s) Oral three times a day PRN Muscle Spasm  HYDROmorphone  Injectable 0.5 milliGRAM(s) IV Push every 4 hours PRN breakthrough pain  melatonin 5 milliGRAM(s) Oral at bedtime PRN Insomnia  ondansetron Injectable 4 milliGRAM(s) IV Push once PRN Nausea and/or Vomiting  oxyCODONE    IR 5 milliGRAM(s) Oral every 4 hours PRN Moderate Pain (4 - 6)  saline laxative (FLEET) Rectal Enema 1 Enema Rectal once PRN constipation  traMADol 50 milliGRAM(s) Oral every 6 hours PRN Severe Pain (7 - 10)      Social History: see consult    Family history: see consult    ROS:   ENT: all negative except as noted in HPI   Pulm: denies SOB, cough, hemoptysis  Neuro: denies numbness/tingling, loss of sensation  Endo: denies heat/cold intolerance, excessive sweating      Vital Signs Last 24 Hrs  T(C): 36.8 (04 Jul 2022 15:00), Max: 37.4 (03 Jul 2022 19:00)  T(F): 98.2 (04 Jul 2022 15:00), Max: 99.3 (03 Jul 2022 19:00)  HR: 85 (04 Jul 2022 15:00) (77 - 101)  BP: 135/69 (04 Jul 2022 15:00) (135/69 - 155/85)  BP(mean): 85 (04 Jul 2022 15:00) (85 - 105)  RR: 18 (04 Jul 2022 15:00) (16 - 21)  SpO2: 94% (04 Jul 2022 15:00) (93% - 98%)                          11.9   9.19  )-----------( 401      ( 04 Jul 2022 12:53 )             36.7    07-03    132<L>  |  100  |  5<L>  ----------------------------<  88  4.3   |  20<L>  |  0.54    Ca    9.0      03 Jul 2022 22:06  Phos  2.5     07-03  Mg     1.7     07-03         PHYSICAL EXAM:  Gen: NAD  Skin: No rashes, bruises, or lesions  Head: Normocephalic, Atraumatic  Face: no edema, erythema, or fluctuance. Parotid glands soft without mass  Eyes: no scleral injection  Ears: Right - ear canal clear, TM intact without effusion or erythema. No evidence of any fluid drainage. No mastoid tenderness, erythema, or ear bulging            Left - ear canal clear, TM intact without effusion or erythema. No evidence of any fluid drainage. No mastoid tenderness, erythema, or ear bulging  Nose: Nares bilaterally patent, no discharge  Mouth: No Stridor / Drooling / Trismus.  Mucosa moist, tongue/uvula midline, oropharynx clear  Neck: Flat, supple, no lymphadenopathy, trachea midline, no masses  Lymphatic: No lymphadenopathy  Resp: breathing easily, no stridor  Neuro: facial nerve intact, no facial droop         ENT ISSUE/POD: S/P CSF Leak Repair POD #4      HPI: 47 YO F with CSF leak from L ethmoid sinus S/P covid swab on 5/28 at [facility], over past several wks while traveling w/ increasing flow of rhinorrhea, eventually developed sxs cold and meningitis (HA/nausea/cough/gen weakness), adm [facility] on 6/20 febrile to 102, CSF PCR pos enterovirus, ID kept on cefepime. PT c/o salty taste. S/P Endonasal CSF Leak repair today. CSF leak in OR, repaired and LD placed. Pt was evaluated at bedside. C/o frontal HA, 8/10 pain, relived with pain meds.  Denies salty/Metallic taste in mouth, fever/chills,  cough, N/V, facial pain, changes in vision.           PAST MEDICAL & SURGICAL HISTORY:  HLD (hyperlipidemia)      Chronic back pain        Allergies    No Known Allergies    Intolerances      MEDICATIONS  (STANDING):  atorvastatin 40 milliGRAM(s) Oral at bedtime  chlorhexidine 4% Liquid 1 Application(s) Topical daily  enoxaparin Injectable 40 milliGRAM(s) SubCutaneous <User Schedule>  polyethylene glycol 3350 17 Gram(s) Oral daily  senna 1 Tablet(s) Oral two times a day  sodium chloride 0.65% Nasal 1 Spray(s) Both Nostrils three times a day  topiramate 25 milliGRAM(s) Oral daily    MEDICATIONS  (PRN):  acetaminophen     Tablet .. 650 milliGRAM(s) Oral every 6 hours PRN Temp greater or equal to 38C (100.4F), Mild Pain (1 - 3)  bisacodyl 5 milliGRAM(s) Oral every 12 hours PRN Constipation  bisacodyl Suppository 10 milliGRAM(s) Rectal daily PRN Constipation  cyclobenzaprine 10 milliGRAM(s) Oral three times a day PRN Muscle Spasm  HYDROmorphone  Injectable 0.5 milliGRAM(s) IV Push every 4 hours PRN breakthrough pain  melatonin 5 milliGRAM(s) Oral at bedtime PRN Insomnia  ondansetron Injectable 4 milliGRAM(s) IV Push once PRN Nausea and/or Vomiting  oxyCODONE    IR 5 milliGRAM(s) Oral every 4 hours PRN Moderate Pain (4 - 6)  saline laxative (FLEET) Rectal Enema 1 Enema Rectal once PRN constipation  traMADol 50 milliGRAM(s) Oral every 6 hours PRN Severe Pain (7 - 10)      Social History: see consult    Family history: see consult    ROS:   ENT: all negative except as noted in HPI   Pulm: denies SOB, cough, hemoptysis  Neuro: denies numbness/tingling, loss of sensation  Endo: denies heat/cold intolerance, excessive sweating      Vital Signs Last 24 Hrs  T(C): 36.8 (04 Jul 2022 15:00), Max: 37.4 (03 Jul 2022 19:00)  T(F): 98.2 (04 Jul 2022 15:00), Max: 99.3 (03 Jul 2022 19:00)  HR: 85 (04 Jul 2022 15:00) (77 - 101)  BP: 135/69 (04 Jul 2022 15:00) (135/69 - 155/85)  BP(mean): 85 (04 Jul 2022 15:00) (85 - 105)  RR: 18 (04 Jul 2022 15:00) (16 - 21)  SpO2: 94% (04 Jul 2022 15:00) (93% - 98%)                          11.9   9.19  )-----------( 401      ( 04 Jul 2022 12:53 )             36.7    07-03    132<L>  |  100  |  5<L>  ----------------------------<  88  4.3   |  20<L>  |  0.54    Ca    9.0      03 Jul 2022 22:06  Phos  2.5     07-03  Mg     1.7     07-03         PHYSICAL EXAM:  Gen: NAD, On Humidified O2.   Skin: No rashes, bruises, or lesions  Head: Normocephalic, Atraumatic  Face: no edema, erythema, or fluctuance. Parotid glands soft without mass  Eyes: no scleral injection  Nose: serosang secretions in from Left> right Nare.  No CSF leak noted on exam.   Mouth: No Stridor / Drooling / Trismus.  Mucosa moist, tongue/uvula midline, crusted blood in posterior oropharynx.  Neck: Flat, supple, no lymphadenopathy, trachea midline, no masses  Lymphatic: No lymphadenopathy  Resp: On Humidified O2.   Neuro: facial nerve intact, no facial droop.

## 2022-07-05 ENCOUNTER — TRANSCRIPTION ENCOUNTER (OUTPATIENT)
Age: 47
End: 2022-07-05

## 2022-07-05 ENCOUNTER — INPATIENT (INPATIENT)
Facility: HOSPITAL | Age: 47
LOS: 4 days | Discharge: HOME | DRG: 32 | End: 2022-07-10
Attending: NEUROLOGICAL SURGERY | Admitting: NEUROLOGICAL SURGERY
Payer: MEDICAID

## 2022-07-05 VITALS
RESPIRATION RATE: 12 BRPM | SYSTOLIC BLOOD PRESSURE: 156 MMHG | HEART RATE: 79 BPM | OXYGEN SATURATION: 95 % | DIASTOLIC BLOOD PRESSURE: 76 MMHG

## 2022-07-05 VITALS
SYSTOLIC BLOOD PRESSURE: 152 MMHG | OXYGEN SATURATION: 98 % | HEART RATE: 72 BPM | RESPIRATION RATE: 19 BRPM | DIASTOLIC BLOOD PRESSURE: 72 MMHG

## 2022-07-05 DIAGNOSIS — G96.08 OTHER CRANIAL CEREBROSPINAL FLUID LEAK: ICD-10-CM

## 2022-07-05 LAB — SURGICAL PATHOLOGY STUDY: SIGNIFICANT CHANGE UP

## 2022-07-05 PROCEDURE — 99233 SBSQ HOSP IP/OBS HIGH 50: CPT

## 2022-07-05 PROCEDURE — 83036 HEMOGLOBIN GLYCOSYLATED A1C: CPT

## 2022-07-05 PROCEDURE — U0003: CPT

## 2022-07-05 PROCEDURE — 80061 LIPID PANEL: CPT

## 2022-07-05 PROCEDURE — 80048 BASIC METABOLIC PNL TOTAL CA: CPT

## 2022-07-05 PROCEDURE — 86850 RBC ANTIBODY SCREEN: CPT

## 2022-07-05 PROCEDURE — 85610 PROTHROMBIN TIME: CPT

## 2022-07-05 PROCEDURE — 85025 COMPLETE CBC W/AUTO DIFF WBC: CPT

## 2022-07-05 PROCEDURE — 97165 OT EVAL LOW COMPLEX 30 MIN: CPT

## 2022-07-05 PROCEDURE — 99291 CRITICAL CARE FIRST HOUR: CPT

## 2022-07-05 PROCEDURE — 70450 CT HEAD/BRAIN W/O DYE: CPT

## 2022-07-05 PROCEDURE — C1729: CPT

## 2022-07-05 PROCEDURE — C1889: CPT

## 2022-07-05 PROCEDURE — 85027 COMPLETE CBC AUTOMATED: CPT

## 2022-07-05 PROCEDURE — 85730 THROMBOPLASTIN TIME PARTIAL: CPT

## 2022-07-05 PROCEDURE — 84100 ASSAY OF PHOSPHORUS: CPT

## 2022-07-05 PROCEDURE — 86140 C-REACTIVE PROTEIN: CPT

## 2022-07-05 PROCEDURE — U0005: CPT

## 2022-07-05 PROCEDURE — 88305 TISSUE EXAM BY PATHOLOGIST: CPT

## 2022-07-05 PROCEDURE — 84702 CHORIONIC GONADOTROPIN TEST: CPT

## 2022-07-05 PROCEDURE — 85652 RBC SED RATE AUTOMATED: CPT

## 2022-07-05 PROCEDURE — 97161 PT EVAL LOW COMPLEX 20 MIN: CPT

## 2022-07-05 PROCEDURE — 88304 TISSUE EXAM BY PATHOLOGIST: CPT

## 2022-07-05 PROCEDURE — 86901 BLOOD TYPING SEROLOGIC RH(D): CPT

## 2022-07-05 PROCEDURE — 84145 PROCALCITONIN (PCT): CPT

## 2022-07-05 PROCEDURE — 86900 BLOOD TYPING SEROLOGIC ABO: CPT

## 2022-07-05 PROCEDURE — 36415 COLL VENOUS BLD VENIPUNCTURE: CPT

## 2022-07-05 PROCEDURE — 80053 COMPREHEN METABOLIC PANEL: CPT

## 2022-07-05 PROCEDURE — 93970 EXTREMITY STUDY: CPT

## 2022-07-05 PROCEDURE — 83605 ASSAY OF LACTIC ACID: CPT

## 2022-07-05 PROCEDURE — C9399: CPT

## 2022-07-05 PROCEDURE — 83735 ASSAY OF MAGNESIUM: CPT

## 2022-07-05 RX ORDER — ACETAZOLAMIDE 250 MG/1
500 TABLET ORAL
Refills: 0 | Status: DISCONTINUED | OUTPATIENT
Start: 2022-07-05 | End: 2022-07-06

## 2022-07-05 RX ORDER — ATORVASTATIN CALCIUM 80 MG/1
40 TABLET, FILM COATED ORAL AT BEDTIME
Refills: 0 | Status: DISCONTINUED | OUTPATIENT
Start: 2022-07-05 | End: 2022-07-10

## 2022-07-05 RX ORDER — CYCLOBENZAPRINE HYDROCHLORIDE 10 MG/1
10 TABLET, FILM COATED ORAL THREE TIMES A DAY
Refills: 0 | Status: DISCONTINUED | OUTPATIENT
Start: 2022-07-05 | End: 2022-07-10

## 2022-07-05 RX ORDER — LANOLIN ALCOHOL/MO/W.PET/CERES
5 CREAM (GRAM) TOPICAL AT BEDTIME
Refills: 0 | Status: DISCONTINUED | OUTPATIENT
Start: 2022-07-05 | End: 2022-07-10

## 2022-07-05 RX ORDER — SODIUM CHLORIDE 0.65 %
1 AEROSOL, SPRAY (ML) NASAL
Refills: 0 | Status: DISCONTINUED | OUTPATIENT
Start: 2022-07-05 | End: 2022-07-10

## 2022-07-05 RX ORDER — LANOLIN ALCOHOL/MO/W.PET/CERES
1 CREAM (GRAM) TOPICAL
Qty: 0 | Refills: 0 | DISCHARGE
Start: 2022-07-05

## 2022-07-05 RX ORDER — TOPIRAMATE 25 MG
25 TABLET ORAL DAILY
Refills: 0 | Status: DISCONTINUED | OUTPATIENT
Start: 2022-07-05 | End: 2022-07-10

## 2022-07-05 RX ORDER — POLYETHYLENE GLYCOL 3350 17 G/17G
17 POWDER, FOR SOLUTION ORAL DAILY
Refills: 0 | Status: DISCONTINUED | OUTPATIENT
Start: 2022-07-05 | End: 2022-07-10

## 2022-07-05 RX ORDER — SENNA PLUS 8.6 MG/1
1 TABLET ORAL
Qty: 0 | Refills: 0 | DISCHARGE
Start: 2022-07-05

## 2022-07-05 RX ORDER — ACETAMINOPHEN 500 MG
650 TABLET ORAL EVERY 4 HOURS
Refills: 0 | Status: DISCONTINUED | OUTPATIENT
Start: 2022-07-05 | End: 2022-07-10

## 2022-07-05 RX ORDER — SODIUM CHLORIDE 0.65 %
1 AEROSOL, SPRAY (ML) NASAL
Qty: 0 | Refills: 0 | DISCHARGE
Start: 2022-07-05

## 2022-07-05 RX ORDER — POLYETHYLENE GLYCOL 3350 17 G/17G
17 POWDER, FOR SOLUTION ORAL
Qty: 0 | Refills: 0 | DISCHARGE
Start: 2022-07-05

## 2022-07-05 RX ORDER — OXYCODONE HYDROCHLORIDE 5 MG/1
5 TABLET ORAL EVERY 4 HOURS
Refills: 0 | Status: DISCONTINUED | OUTPATIENT
Start: 2022-07-05 | End: 2022-07-10

## 2022-07-05 RX ORDER — SENNA PLUS 8.6 MG/1
1 TABLET ORAL
Refills: 0 | Status: DISCONTINUED | OUTPATIENT
Start: 2022-07-05 | End: 2022-07-10

## 2022-07-05 RX ADMIN — Medication 1 SPRAY(S): at 05:07

## 2022-07-05 RX ADMIN — Medication 5 MILLIGRAM(S): at 21:47

## 2022-07-05 RX ADMIN — SENNA PLUS 1 TABLET(S): 8.6 TABLET ORAL at 17:31

## 2022-07-05 RX ADMIN — OXYCODONE HYDROCHLORIDE 5 MILLIGRAM(S): 5 TABLET ORAL at 19:23

## 2022-07-05 RX ADMIN — POLYETHYLENE GLYCOL 3350 17 GRAM(S): 17 POWDER, FOR SOLUTION ORAL at 12:28

## 2022-07-05 RX ADMIN — OXYCODONE HYDROCHLORIDE 5 MILLIGRAM(S): 5 TABLET ORAL at 18:53

## 2022-07-05 RX ADMIN — ATORVASTATIN CALCIUM 40 MILLIGRAM(S): 80 TABLET, FILM COATED ORAL at 21:47

## 2022-07-05 RX ADMIN — Medication 1 SPRAY(S): at 17:32

## 2022-07-05 RX ADMIN — Medication 25 MILLIGRAM(S): at 12:28

## 2022-07-05 RX ADMIN — ACETAZOLAMIDE 500 MILLIGRAM(S): 250 TABLET ORAL at 17:30

## 2022-07-05 RX ADMIN — OXYCODONE HYDROCHLORIDE 5 MILLIGRAM(S): 5 TABLET ORAL at 13:31

## 2022-07-05 RX ADMIN — OXYCODONE HYDROCHLORIDE 5 MILLIGRAM(S): 5 TABLET ORAL at 07:15

## 2022-07-05 RX ADMIN — OXYCODONE HYDROCHLORIDE 5 MILLIGRAM(S): 5 TABLET ORAL at 12:31

## 2022-07-05 RX ADMIN — OXYCODONE HYDROCHLORIDE 5 MILLIGRAM(S): 5 TABLET ORAL at 08:15

## 2022-07-05 NOTE — DISCHARGE NOTE PROVIDER - NSDCFUSCHEDAPPT_GEN_ALL_CORE_FT
[facility]  CATSCAN [address]  Scheduled Appointment: 08/17/2022    Gilbert Choi  [facility]  OTOLARYNG [address] [facility]  Scheduled Appointment: 08/23/2022    Jose Antonio Hanson  [facility]  NEUROSURG [address] [facility]  Scheduled Appointment: 08/30/2022

## 2022-07-05 NOTE — PROGRESS NOTE ADULT - SUBJECTIVE AND OBJECTIVE BOX
ENT ISSUE/POD: S/P CSF Leak Repair POD #5      HPI: 45 YO F with CSF leak from L ethmoid sinus S/P covid swab on 5/28 at [facility], over past several wks while traveling w/ increasing flow of rhinorrhea, eventually developed sxs cold and meningitis (HA/nausea/cough/gen weakness), adm [facility] on 6/20 febrile to 102, CSF PCR pos enterovirus, ID kept on cefepime. PT c/o salty taste. S/P Endonasal CSF Leak repair today. CSF leak in OR, repaired and LD placed. Pt was evaluated at bedside. C/o frontal HA, 8/10 pain, relived with pain meds.  Denies salty/Metallic taste in mouth, fever/chills,  cough, N/V, facial pain, changes in vision.             PAST MEDICAL & SURGICAL HISTORY:  HLD (hyperlipidemia)      Chronic back pain        Allergies    No Known Allergies    Intolerances      MEDICATIONS  (STANDING):  atorvastatin 40 milliGRAM(s) Oral at bedtime  chlorhexidine 4% Liquid 1 Application(s) Topical daily  enoxaparin Injectable 40 milliGRAM(s) SubCutaneous <User Schedule>  polyethylene glycol 3350 17 Gram(s) Oral daily  senna 1 Tablet(s) Oral two times a day  sodium chloride 0.65% Nasal 1 Spray(s) Both Nostrils three times a day  topiramate 25 milliGRAM(s) Oral daily    MEDICATIONS  (PRN):  acetaminophen     Tablet .. 650 milliGRAM(s) Oral every 6 hours PRN Temp greater or equal to 38C (100.4F), Mild Pain (1 - 3)  bisacodyl 5 milliGRAM(s) Oral every 12 hours PRN Constipation  bisacodyl Suppository 10 milliGRAM(s) Rectal daily PRN Constipation  cyclobenzaprine 10 milliGRAM(s) Oral three times a day PRN Muscle Spasm  HYDROmorphone  Injectable 0.5 milliGRAM(s) IV Push every 4 hours PRN breakthrough pain  melatonin 5 milliGRAM(s) Oral at bedtime PRN Insomnia  ondansetron Injectable 4 milliGRAM(s) IV Push once PRN Nausea and/or Vomiting  oxyCODONE    IR 5 milliGRAM(s) Oral every 4 hours PRN Moderate Pain (4 - 6)  traMADol 50 milliGRAM(s) Oral every 6 hours PRN Severe Pain (7 - 10)      Social History: see consult    Family history: see consult    ROS:   ENT: all negative except as noted in HPI   Pulm: denies SOB, cough, hemoptysis  Neuro: denies numbness/tingling, loss of sensation  Endo: denies heat/cold intolerance, excessive sweating      Vital Signs Last 24 Hrs  T(C): 36.7 (05 Jul 2022 07:00), Max: 37.5 (04 Jul 2022 19:00)  T(F): 98 (05 Jul 2022 07:00), Max: 99.5 (04 Jul 2022 19:00)  HR: 80 (05 Jul 2022 08:00) (70 - 100)  BP: 147/71 (05 Jul 2022 08:00) (130/81 - 155/85)  BP(mean): 93 (05 Jul 2022 08:00) (85 - 103)  RR: 18 (05 Jul 2022 08:00) (15 - 20)  SpO2: 94% (05 Jul 2022 08:00) (94% - 96%)                          11.9   9.19  )-----------( 401      ( 04 Jul 2022 12:53 )             36.7    07-04    141  |  104  |  6<L>  ----------------------------<  76  3.7   |  21<L>  |  0.59    Ca    9.1      04 Jul 2022 16:54  Phos  2.5     07-04  Mg     1.8     07-04         PHYSICAL EXAM:  Gen: NAD, On Humidified O2.   Skin: No rashes, bruises, or lesions  Head: Normocephalic, Atraumatic  Face: no edema, erythema, or fluctuance. Parotid glands soft without mass  Eyes: no scleral injection  Nose: serosang secretions in from Left> right Nare.  No CSF leak noted on exam.   Mouth: No Stridor / Drooling / Trismus.  Mucosa moist, tongue/uvula midline, crusted blood in posterior oropharynx.  Neck: Flat, supple, no lymphadenopathy, trachea midline, no masses  Lymphatic: No lymphadenopathy  Resp: On Humidified O2.   Neuro: facial nerve intact, no facial droop.

## 2022-07-05 NOTE — PROGRESS NOTE ADULT - ASSESSMENT
45 YO F with CSF leak from L ethmoid sinus S/P Endonasal CSF Leak repair POD #5. CSF leak in OR, repaired and LD placed. C/o frontal HA, 8/10 pain, relieved with pain meds. No CSF Leak noted on exam.

## 2022-07-05 NOTE — H&P ADULT - ASSESSMENT
46yoF PMH HLD, chronic back pain, migraines, transferred from [facility] POD#6 s/p endoscopic endonasal repair of CSF leak w/ skull base reconstruction and placement of LD. LD removed this AM.    -Start Diamox 500 BID first dose now to decrease intracranial pressure  -Plan for LP in 2-3 days to check opening pressure. If elevated, plan for shunt placement.  -Pain control  -Encourage OOB/ambulation  -Pain control: prn APAP/Oxy/Flexeril  -Cont Nasal saline QID  -Migraines: cont home Topamax  -HLD: cont home Lipitor  -Bowel regimen  -Reg diet  -IVL  -Voiding  -DVT ppx: SCDs, no chemical ppx at this time given plan for LP and possible shunt
surgery

## 2022-07-05 NOTE — CONSULT NOTE ADULT - CRITICAL CARE ATTENDING COMMENT
Agree with above assessment and plan.     46yoF PMH HLD, chronic back pain, migraines, transferred from [facility] POD#6 s/p endoscopic endonasal repair of CSF leak w/ skull base reconstruction and placement of LD. LD removed this AM.    Neuro intact    start diamox 500mg bid  consider LP to eval opening pressure  q4hr neurochecks

## 2022-07-05 NOTE — DISCHARGE NOTE NURSING/CASE MANAGEMENT/SOCIAL WORK - PATIENT PORTAL LINK FT
You can access the FollowMyHealth Patient Portal offered by [facility] by registering at the following website: [email or web address]. By joining [employer]s FollowMyHealth portal, you will also be able to view your health information using other applications (apps) compatible with our system.

## 2022-07-05 NOTE — PROGRESS NOTE ADULT - SUBJECTIVE AND OBJECTIVE BOX
Patient seen and examined at bedside.    --Anticoagulation--  enoxaparin Injectable 40 milliGRAM(s) SubCutaneous <User Schedule>    T(C): 36.9 (07-04-22 @ 23:00), Max: 37.5 (07-04-22 @ 19:00)  HR: 90 (07-04-22 @ 23:00) (77 - 100)  BP: 149/83 (07-04-22 @ 23:00) (130/81 - 155/85)  RR: 20 (07-04-22 @ 23:00) (16 - 21)  SpO2: 95% (07-04-22 @ 23:00) (93% - 95%)  Wt(kg): --    Exam:  AOx3, FC, PERRL, EOMI, no facial, 5/5 throughout, no drift

## 2022-07-05 NOTE — PROGRESS NOTE ADULT - PROVIDER SPECIALTY LIST ADULT
ENT
NSICU
Neurosurgery
ENT
NSICU
Neurosurgery
ENT
ENT
NSICU
ENT

## 2022-07-05 NOTE — H&P ADULT - NSHPPHYSICALEXAM_GEN_ALL_CORE
PHYSICAL EXAM:  GENERAL: NAD, well-groomed, well-developed  HEAD:  Atraumatic, normocephalic  DRESSING: Dressing under nose dry w/ no staining or evidence of drainage  MENTAL STATUS: AAO x3; Awake; Opens eyes spontaneously; Appropriately conversant without aphasia; following simple commands  CRANIAL NERVES: Visual acuity normal for age, visual fields full to confrontation, PERRL. EOMI without nystagmus. Facial sensation intact. Face symmetric. Hearing grossly intact. Speech clear.   MOTOR: strength 5/5 b/l upper and lower extremities  SENSATION: grossly intact to light touch all extremities  CHEST/LUNG: non labored  HEART: Regular rate and rhythm  ABDOMEN: Soft, nontender, nondistended  SKIN: Warm, dry; no rashes or lesions

## 2022-07-05 NOTE — PROGRESS NOTE ADULT - SUBJECTIVE AND OBJECTIVE BOX
SUMMARY:  46 year-old woman with hyperlipidemia and chronic low back pain underwent cerebrospinal fluid leak repair on 6/29/22 with pre-op lumbar drain placement.     24 HOUR EVENTS:  LD  5 cc/hr. to be removed today    VITALS/DATA/ORDERS: [x] Reviewed  LD @ 5cc/h    EXAMINATION:   General: Calm  HEENT: Anicteric sclerae  Cardiac: [id number]  Lungs: Clear, no wheezes, rhonchi.   Abdomen: Soft, non-tender, +BS.   Extremities: No c/c/e  Skin/Incision Site: Clean, dry and intact  Neurologic: Awake, alert, fully oriented, follows commands, PERRL, EOMI, face symmetric, tongue midline, no drift, full strength (RUE pain limited but able to give full strength).   SUMMARY:  46 year-old woman with hyperlipidemia and chronic low back pain underwent cerebrospinal fluid leak repair on 6/29/22 with pre-op lumbar drain placement.     24 HOUR EVENTS:  LD  5 cc/hr. to be removed today    VITALS/DATA/ORDERS: [x] Reviewed      EXAMINATION:   General: Calm  HEENT: Anicteric sclerae  Cardiac: [id number]  Lungs: Clear, no wheezes, rhonchi.   Abdomen: Soft, non-tender, +BS.   Extremities: No c/c/e  Skin/Incision Site: Clean, dry and intact  Neurologic: Awake, alert, fully oriented, follows commands, PERRL, EOMI, face symmetric, tongue midline, no drift, full strength (RUE pain limited but able to give full strength).

## 2022-07-05 NOTE — DISCHARGE NOTE PROVIDER - HOSPITAL COURSE
46F p/w CSF leak L ethmoid sinus s/p covid swab on 5/28, over past several wks while traveling w/ increaing flow of rhinorrhea, eventually developed sxs cold and meningitis (HA/nausea/cough/gen weakness), adm [facility] on 6/20 febrile to 102, CSF PCR pos enterovirus, ID kept on cefepime. Exam: +rhinorrhea from L nare, otherwise intact, no HA, AVSS. pt  was transfered to [facility] at [place]  on 6/28/22 and underwent endoscopic endonasal repair of CSF leak with lumbar drain on 6/29/22. CTH 7/1 with a very small interhemispheric SDH, stable on subsequent CT. 7/5/22 lumbar drain removed and pt is transferred back to [facility] for vps .   46F p/w CSF leak L ethmoid sinus s/p covid swab on 5/28, over past several wks while traveling w/ increaing flow of rhinorrhea, eventually developed sxs cold and meningitis (HA/nausea/cough/gen weakness), adm [facility] on 6/20 febrile to 102, CSF PCR pos enterovirus, ID kept on cefepime. Exam: +rhinorrhea from L nare, otherwise intact, no HA, AVSS. pt  was transfered to [facility] at [place]  on 6/28/22 and underwent endoscopic endonasal repair of CSF leak with lumbar drain on 6/29/22. CTH 7/1 with a very small interhemispheric SDH, stable on subsequent CT. 7/5/22 lumbar drain removed and pt is transferred back to [facility] for vps .    8/11/2022 addendum to discharge note: Patient had electrolyte supplementation during hospital stay for hypomagnesemia and hypophosphatemia

## 2022-07-05 NOTE — H&P ADULT - HISTORY OF PRESENT ILLNESS
46yoF PMH HLD, chronic back pain, migraines, transferred from [facility] p/o from repair of CSF leak. Pt initially presented to [facility] on 6/21 w/ L nare nasal drainage since after a COVID swab 5/28. Pt needed swab performed to travel to [place]. While in [place], pt had persistent rhinorrhea since COVID swab, associated fever, HA, cough and generalized weakness. 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 Pt completed 1 week of augmentin. [place] the [place] 6[place]16 and f/u w/ neurology/Dr Adames who sent her for further evaluation. She continued to have fevers up to 102 and came to [facility] ED on 6/20. CTH showed Mildly low-lying cerebellar tonsils. Given Ceftriaxone & Vanco x1. 6/22 ID consulted and started on Cefepime and Vanco. MRI brain +/- & MRV showed  Unremarkable MRI of the brain with and without contrast. Fluid in the left ethmoid and maxillary sinus may be related to CSF leak versus sinusitis. Recommend CT cisternogram for further evaluation. Normal intracranial MR venogram. 6/23 CSF PCR + enterovirus. 6/25 CT maxillofacial Thinning of the cribriform plate with fluid in the left ethmoid and maxillary sinus likely related to CSF leak. 6/28 Transferred to [facility] for surgical management coordinated w/ ENT. 6/29 OR for endoscopic endonasal repair of CSF leak w/ skull base reconstruction and placement of LD. LD @10cc/hr. P/o c/o frontal HA improved w/ pain medication. No further nasal drainage or salty/metallic taste. 7/1 CTH very small interhemispheric SDH stable on subsequent CTH 7/2. 7/5 LD removed and transferred back to [facility] ICU. Currently c/o mild L temporal HA worse w/ lying down.

## 2022-07-05 NOTE — DISCHARGE NOTE PROVIDER - CARE PROVIDER_API CALL
Jose Antonio Hanson; PhD)  Neurosurgery  [address] [place] [address]  Phone: [phone]  Fax: [phone]  Follow Up Time:

## 2022-07-05 NOTE — DISCHARGE NOTE PROVIDER - NSDCMRMEDTOKEN_GEN_ALL_CORE_FT
acetaminophen 325 mg oral tablet: 2 tab(s) orally every 6 hours, As needed, Temp greater or equal to 38C (100.4F), Mild Pain (1 - 3)  atorvastatin 40 mg oral tablet: 1 tab(s) orally once a day  cyclobenzaprine 10 mg oral tablet: 1 tab(s) orally 3 times a day  melatonin 5 mg oral tablet: 1 tab(s) orally once a day (at bedtime), As needed, Insomnia  oxyCODONE 5 mg oral tablet: 1 tab(s) orally every 4 hours, As Needed, moderate pain  polyethylene glycol 3350 oral powder for reconstitution: 17 gram(s) orally once a day  senna leaf extract oral tablet: 1 tab(s) orally 2 times a day  sodium chloride 0.65% nasal spray: 1 spray(s) nasal 4 times a day  topiramate 25 mg oral tablet: 1 tab(s) orally once a day   acetaminophen 325 mg oral tablet: 2 tab(s) orally every 6 hours, As needed, Temp greater or equal to 38C (100.4F), Mild Pain (1 - 3)  atorvastatin 40 mg oral tablet: 1 tab(s) orally once a day  CMP, Mg, Phos: CMP, Mg, Phos before 7/20/22  please send results to Dr Hanson or ENT Dr Choi or pt&#x27;s PCP  for f/u hyponatremia, last Na+ 139 on 7/10 on 1g TID  cyclobenzaprine 10 mg oral tablet: 1 tab(s) orally 3 times a day, As needed, Muscle Spasm  melatonin 5 mg oral tablet: 1 tab(s) orally once a day (at bedtime), As needed, Insomnia  Occupational Therapy : Please Evaluate and Treat post-op   ocular lubricant ophthalmic solution: 1 drop(s) to each affected eye 3 times a day  oxyCODONE 5 mg oral tablet: 1 tab(s) orally every 4 hours, As Needed, moderate pain  polyethylene glycol 3350 oral powder for reconstitution: 17 gram(s) orally once a day  senna leaf extract oral tablet: 1 tab(s) orally 2 times a day  sodium chloride 0.65% nasal spray: 1 spray(s) nasal 4 times a day  sodium chloride 1 g oral tablet: 1 tab(s) orally 2 times a day   topiramate 25 mg oral tablet: 1 tab(s) orally once a day

## 2022-07-05 NOTE — CHART NOTE - NSCHARTNOTEFT_GEN_A_CORE
SANDY [id number]      Drain type: []SD []SG [] NATA [] HMV [x] Lumbar drain [] EVD [] ICP [place] [] Abd drain     Patient's position while drain removed: flat    [x] Patient tolerated well [x] No complications [] complications:     Exit Site secured with: [] __ staples [x  __figure 8  suture (please specify how many of each)     Additional Info: after removal of lumbar drain there was a high pressure leak that required figure of 8 suture 2.0     neurosurgical resident informed

## 2022-07-05 NOTE — PROGRESS NOTE ADULT - PROBLEM SELECTOR PLAN 1
·  Plan: ·  Plan: ·  Plan: - Monitor for CSF leak.   - NS 0.65% 2gtt BID to b/l Nares 4times/d.   - TSRP Precautions (no incentive spirometry, no straws, no BIPAP)  - Humidified O2 prn.   - Pain meds prn.   - ENT will follow.   - Call with questions.
- S/P Endonasal Repair of CSF leak.   - Monitor for CSF leak.   - NS 0.65% 2gtt BID to b/l Nares 4times/d.   - LD @ 10cc/hr.   - TSRP Precautions (no incentive spirometry, no straws, no BIPAP)  - Humidified O2 prn.   - Pain meds prn.   - ENT will follow.   - Call with questions.     # [id number]  ENT PA
·  Plan: - Monitor for CSF leak.   - NS 0.65% 2gtt BID to b/l Nares 4times/d.   - TSRP Precautions (no incentive spirometry, no straws, no BIPAP)  - Humidified O2 prn.   - Pain meds prn.   - ENT will follow.   - Call with questions.
- Monitor for CSF leak.   - NS 0.65% 2gtt BID to b/l Nares 4times/d.   - LD @ 10cc/hr.   - TSRP Precautions (no incentive spirometry, no straws, no BIPAP)  - Humidified O2 prn.   - Pain meds prn.   - ENT will follow.   - Call with questions.
- Monitor for CSF leak.   - NS 0.65% 2gtt BID to b/l Nares 4times/d.   - TSRP Precautions (no incentive spirometry, no straws, no BIPAP)  - Humidified O2 prn.   - Pain meds prn.   - ENT will follow.   - Call with questions.
·  Plan: ·  Plan: - Monitor for CSF leak.   - NS 0.65% 2gtt BID to b/l Nares 4times/d.   - TSRP Precautions (no incentive spirometry, no straws, no BIPAP)  - Humidified O2 prn.   - Pain meds prn.   - ENT will follow.   - Call with questions.
- Monitor for CSF leak.   - NS 0.65% 2gtt BID to b/l Nares 4times/d.   - TSRP Precautions (no incentive spirometry, no straws, no BIPAP)  - Humidified O2 prn.   - Pain meds prn.   - ENT will follow.   - Call with questions.

## 2022-07-05 NOTE — PATIENT PROFILE ADULT - FALL HARM RISK - UNIVERSAL INTERVENTIONS
Bed in lowest position, wheels locked, appropriate side rails in place/Call bell, personal items and telephone in reach/Instruct patient to call for assistance before getting out of bed or chair/Non-slip footwear when patient is out of bed/[place] to call system/Physically safe environment - no spills, clutter or unnecessary equipment/Purposeful Proactive Rounding/Room/bathroom lighting operational, light cord in reach

## 2022-07-05 NOTE — PROGRESS NOTE ADULT - ASSESSMENT
ASSESSMENT/PLAN: cerebrospinal fluid leak status post repair/lumbar drain, post-operative day 4    NEURO:  -neuro check q4  -pain management w/ tylenol & oxycodon, tramadol   -Monitor lumbar drain 5cc/hr DC today  - CT head with small SDH; repeat CT Head - stable  -Pitutary precautions: i.e no straws, no bipap, no nasal swabs,   -PT evaluation placed  -c/w topamax     PULMONARY:  saturating well on RA,   -continue to monitor on pulse o2   -no bipap/cpap    CARDIOVASCULAR:  monitor on telemetry   vitals q4  sbp goal 100-160    GASTROENTEROLOGY:  regular   ensure BMs w/ Miralax & senna    RENAL/:  -check BMP qd  -strict i/o's ;   IVF for poor po intake    ENDOCRINE:  A1c- 5.5%    HEME/ONC:  DVT ppx: SQL   b/l SCDs  LED neg 7/1    INFECTIOUS:   Monitor for fevers   Augmentin until 7/3        Patient medically complex but not critically ill ASSESSMENT/PLAN: cerebrospinal fluid leak status post repair/lumbar drain, post-operative day 6    NEURO:  -neuro check q4  -pain management w/ tylenol & oxycodon, tramadol   - lumbar drain 5cc/hr DC today  - CT head with small SDH; repeat CT Head - stable  -Pitutary precautions: i.e no straws, no bipap, no nasal swabs,   -PT evaluation placed  -c/w topamax     PULMONARY:  saturating well on RA,   -continue to monitor on pulse o2   -no bipap/cpap    CARDIOVASCULAR:  monitor on telemetry   vitals q4  sbp goal 100-160    GASTROENTEROLOGY:  regular   ensure BMs w/ Miralax & senna  LM 7/4    RENAL/:  -check BMP qd  -strict i/o's       ENDOCRINE:  A1c- 5.5%    HEME/ONC:  DVT ppx: SQL   b/l SCDs  LED neg 7/1    INFECTIOUS:   Monitor for fevers   Augmentin until 7/3    Awaiting floor bed    Patient medically complex but not critically ill

## 2022-07-05 NOTE — PROGRESS NOTE ADULT - ASSESSMENT
Assessment/Plan:   45 yo woman presents with CSF leak post covid swab and enterovirus meningitis treated at [facility], now s/p endoscopic endonasal repair of CSF leak with lumbar drain. CTH 7/1 with a very small interhemispheric SDH, stable on subsequent CT.     No change to plan from daytime.  LD draining 5cc/hr  regular diet  LBM 7/3 and 7/4  Lov ppx

## 2022-07-05 NOTE — PROGRESS NOTE ADULT - ATTENDING COMMENTS
Agree with above.
CT this am unchanged small interhemispheric SDH and small left orbitofrontal heme with edema. LD reduced to 5. plan to cont for 2 more days for total of 5.
Pt evaluated and care plan updated with fellow
Agree with above.
45 yo woman presents with CSF leak post covid swab and enterovirus meningitis treated at [facility], now s/p endoscopic endonasal repair of CSF leak with lumbar drain.    Post-op reports pain on the L side of the face, but no headache.   Post-op CTH without any acute findings.     On exam, awake, fully oriented, briskly follows commands, PERRL, EOMI, face symmetric, no drift in upper or lower extremities     monitor for CSF leak  LD at 10cc/hr  c/w antibiotics for meningitis     Patient seen and examined by attending on 6/29/2022.    Patient is critically ill due to CSF leak and at high risk for neurological deterioration or death due to: possible post-op complications from endoscopic endonasal repair of CSF leak, with CSF leak requiring a LD at risk of intracranial hypotension, requiring close neurologic monitoring.

## 2022-07-05 NOTE — DISCHARGE NOTE NURSING/CASE MANAGEMENT/SOCIAL WORK - NSDCPEFALRISK_GEN_ALL_CORE
For information on Fall & Injury Prevention, visit: [email or web address] OR  [email or web address] OR  https://www.cdc.gov/steadi/patient.html

## 2022-07-05 NOTE — CONSULT NOTE ADULT - ASSESSMENT
46yoF PMH HLD, chronic back pain, migraines, transferred from [facility] POD#6 s/p endoscopic endonasal repair of CSF leak w/ skull base reconstruction and placement of LD. LD removed this AM.    -Start Diamox 500 BID first dose now to decrease intracranial pressure  -Plan for LP in 2-3 days to check opening pressure. If elevated, plan for shunt placement.  -Pain control  -Encourage OOB/ambulation  -Pain control: prn APAP/Oxy/Flexeril  -Cont Nasal saline QID  -Migraines: cont home Topamax  -HLD: cont home Lipitor  -Bowel regimen  -Reg diet  -IVL  -Voiding  -DVT ppx: SCDs, no chemical ppx at this time given plan for LP and possible shunt 46yoF PMH HLD, chronic back pain, migraines, transferred from [facility] POD#6 s/p endoscopic endonasal repair of CSF leak w/ skull base reconstruction and placement of LD. LD removed this AM.    Neuro:             Q1 hour Neuro checks, Q1 hour Vitals             HOB 30 degrees, Neck midline position             Maintain normothermia, PO acetaminophen for temp>38 C or pain             Per neurosurgery start Diamox 500 BID to decrease intracranial pressure             Plan for LP in 2-3 days to check opening pressure. If elevated, plan for shunt placement.             Pain control             Encourage OOB/ambulation             Pain control: prn APAP/Oxy/Flexeril             Cont Nasal saline QID             Migraines: cont home Topamax    CV:  	SBP Goal<140, PRN medications as needed  	HLD: cont home Lipitor  	Access: Central line/Midline catheter/PICC  	Arterial line    Pulm:  	  	Supplemental O2 PRN to maintain Spo2>92%  	Chest PT, OOB, Pulmonary Toilet    GI:             Bowel regimen             Reg diet    		  Gu:  	Voiding  	I&O Q1 hour  	Monitor Electrolytes & Renal Function    Heme:  	Monitor H&H              DVT ppx: SCDs, no chemical ppx at this time given plan for LP and possible shunt  	Mechanical DVT Prophylaxis: Maintain B/L LE sequential compression devices  		  ID:  	Monitor WBC and Temperature  	Antibiotic Regimen:  	  Endo  	Monitor BGL, maintain <180  	SHAQUILLE   		100-150 no correction  		150-200  2units  		200-250  4units  		250-300	 6units  		300-350  8units  		350-400  10units  		400+	12units

## 2022-07-05 NOTE — CONSULT NOTE ADULT - SUBJECTIVE AND OBJECTIVE BOX
Patient is a 46y old  Female who presents with a chief complaint of CSF leak (05 Jul 2022 15:41)    HPI: 46yoF PMH HLD, chronic back pain, migraines, transferred from [facility] p/o from repair of CSF leak. Pt initially presented to [facility] on 6/21 w/ L nare nasal drainage since after a COVID swab 5/28. Pt needed swab performed to travel to [place]. While in [place], pt had persistent rhinorrhea since COVID swab, associated fever, HA, cough and generalized weakness. 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 Pt completed 1 week of augmentin. [place] 6[place] and f/u w/ neurology/Dr Adames who sent her for further evaluation. She continued to have fevers up to 102 and came to [facility] ED on 6/20. CTH showed Mildly low-lying cerebellar tonsils. Given Ceftriaxone & Vanco x1. 6/22 ID consulted and started on Cefepime and Vanco. MRI brain +/- & MRV showed  Unremarkable MRI of the brain with and without contrast. Fluid in the left ethmoid and maxillary sinus may be related to CSF leak versus sinusitis. Recommend CT cisternogram for further evaluation. Normal intracranial MR venogram. 6/23 CSF PCR + enterovirus. 6/25 CT maxillofacial Thinning of the cribriform plate with fluid in the left ethmoid and maxillary sinus likely related to CSF leak. 6/28 Transferred to [facility] for surgical management coordinated w/ ENT. 6/29 OR for endoscopic endonasal repair of CSF leak w/ skull base reconstruction and placement of LD. LD @10cc/hr. P/o c/o frontal HA improved w/ pain medication. No further nasal drainage or salty/metallic taste. 7/1 CTH very small interhemispheric SDH stable on subsequent CTH 7/2. 7/5 LD removed and transferred back to [facility] ICU. Currently c/o mild L temporal HA worse w/ lying down.  (05 Jul 2022 15:41)    PAST MEDICAL & SURGICAL HISTORY:  HLD (hyperlipidemia)  Chronic back pain    Allergies    No Known Allergies    Intolerances    REVIEW OF SYSTEMS  All other ROS negative, other than that mentioned in HPI.     HOME MEDICATIONS:  Home Medications:  acetaminophen 325 mg oral tablet: 2 tab(s) orally every 6 hours, As needed, Temp greater or equal to 38C (100.4F), Mild Pain (1 - 3) (28 Jun 2022 13:50)  atorvastatin 40 mg oral tablet: 1 tab(s) orally once a day (20 Jun 2022 16:27)  cyclobenzaprine 10 mg oral tablet: 1 tab(s) orally 3 times a day (20 Jun 2022 16:27)  melatonin 5 mg oral tablet: 1 tab(s) orally once a day (at bedtime), As needed, Insomnia (05 Jul 2022 13:04)  oxyCODONE 5 mg oral tablet: 1 tab(s) orally every 4 hours, As Needed, moderate pain (05 Jul 2022 13:04)  polyethylene glycol 3350 oral powder for reconstitution: 17 gram(s) orally once a day (05 Jul 2022 13:04)  senna leaf extract oral tablet: 1 tab(s) orally 2 times a day (05 Jul 2022 13:04)  sodium chloride 0.65% nasal spray: 1 spray(s) nasal 4 times a day (05 Jul 2022 13:04)  topiramate 25 mg oral tablet: 1 tab(s) orally once a day (20 Jun 2022 16:27)      Vital Signs Last 24 Hrs  T(C): 36.4 (05 Jul 2022 16:01), Max: 37.5 (04 Jul 2022 19:00)  T(F): 97.5 (05 Jul 2022 16:01), Max: 99.5 (04 Jul 2022 19:00)  HR: 84 (05 Jul 2022 18:00) (69 - 100)  BP: 154/90 (05 Jul 2022 18:00) (130/81 - 159/84)  BP(mean): 106 (05 Jul 2022 18:00) (93 - 120)  RR: 17 (05 Jul 2022 18:00) (12 - 20)  SpO2: 98% (05 Jul 2022 18:00) (94% - 98%)      PHYSICAL EXAM:  Detailed Neurologic Exam:    Patient noted with moustache dressing for leakage from nares, mild sanguinous drainage noted, dressing changed, now C/D/I.  Lumbar spine midline noted with surgical sutures at site of previous lumbar drain.     Mental status: The patient is awake and alert and has normal attention span.  The patient is fully oriented in 3 spheres. The patient is oriented to current events. The patient is able to name objects, follow commands, repeat sentences.    Cranial nerves: Pupils equal and react symmetrically to light. There is no visual field deficit to confrontation. Extraocular motion is full with no nystagmus. There is no ptosis. Facial sensation is intact. Facial musculature is symmetric. Palate elevates symmetrically. Shoulder shrug is normal. Tongue is midline.    Motor: There is normal bulk and tone.  There is no tremor.  Strength is 5/5 in the RUE/RLE.   Strength is 5/5 in the LUE/LLE.    Sensation: Intact to light touch in 4 extremities    Cerebellar: deferred    Gait : deferred        LABS:                        11.9   9.19  )-----------( 401      ( 04 Jul 2022 12:53 )             36.7     07-04    141  |  104  |  6<L>  ----------------------------<  76  3.7   |  21<L>  |  0.59    Ca    9.1      04 Jul 2022 16:54  Phos  2.5     07-04  Mg     1.8     07-04    CULTURES:  Culture Results:   No growth (06-22 @ 21:51)      RADIOLOGY & ADDITIONAL STUDIES:    < from: CT Head No Cont (07.02.22 @ 09:59) >    IMPRESSION:    Similar-appearing small acute subdural hemorrhage in the posterior left   interhemispheric fissure.         Patient is a 46y old  Female who presents with a chief complaint of CSF leak (05 Jul 2022 15:41)    HPI: 46yoF PMH HLD, chronic back pain, migraines, transferred from [facility] p/o from repair of CSF leak. Pt initially presented to [facility] on 6/21 w/ L nare nasal drainage since after a COVID swab 5/28. Pt needed swab performed to travel to [place]. While in [place], pt had persistent rhinorrhea since COVID swab, associated fever, HA, cough and generalized weakness. 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 Pt completed 1 week of augmentin. [place] 6[place] and f/u w/ neurology/Dr Adames who sent her for further evaluation. She continued to have fevers up to 102 and came to [facility] ED on 6/20. CTH showed Mildly low-lying cerebellar tonsils. Given Ceftriaxone & Vanco x1. 6/22 ID consulted and started on Cefepime and Vanco. MRI brain +/- & MRV showed  Unremarkable MRI of the brain with and without contrast. Fluid in the left ethmoid and maxillary sinus may be related to CSF leak versus sinusitis. Recommend CT cisternogram for further evaluation. Normal intracranial MR venogram. 6/23 CSF PCR + enterovirus. 6/25 CT maxillofacial Thinning of the cribriform plate with fluid in the left ethmoid and maxillary sinus likely related to CSF leak. 6/28 Transferred to [facility] for surgical management coordinated w/ ENT. 6/29 OR for endoscopic endonasal repair of CSF leak w/ skull base reconstruction and placement of LD. LD @10cc/hr. P/o c/o frontal HA improved w/ pain medication. No further nasal drainage or salty/metallic taste. 7/1 CTH very small interhemispheric SDH stable on subsequent CTH 7/2. 7/5 LD removed and transferred back to [facility] ICU. Currently c/o mild L temporal HA worse w/ lying down.  (05 Jul 2022 15:41)    PAST MEDICAL & SURGICAL HISTORY:  HLD (hyperlipidemia)  Chronic back pain    Allergies    No Known Allergies    Intolerances    REVIEW OF SYSTEMS  All other ROS negative, other than that mentioned in HPI.     HOME MEDICATIONS:  Home Medications:  acetaminophen 325 mg oral tablet: 2 tab(s) orally every 6 hours, As needed, Temp greater or equal to 38C (100.4F), Mild Pain (1 - 3) (28 Jun 2022 13:50)  atorvastatin 40 mg oral tablet: 1 tab(s) orally once a day (20 Jun 2022 16:27)  cyclobenzaprine 10 mg oral tablet: 1 tab(s) orally 3 times a day (20 Jun 2022 16:27)  melatonin 5 mg oral tablet: 1 tab(s) orally once a day (at bedtime), As needed, Insomnia (05 Jul 2022 13:04)  oxyCODONE 5 mg oral tablet: 1 tab(s) orally every 4 hours, As Needed, moderate pain (05 Jul 2022 13:04)  polyethylene glycol 3350 oral powder for reconstitution: 17 gram(s) orally once a day (05 Jul 2022 13:04)  senna leaf extract oral tablet: 1 tab(s) orally 2 times a day (05 Jul 2022 13:04)  sodium chloride 0.65% nasal spray: 1 spray(s) nasal 4 times a day (05 Jul 2022 13:04)  topiramate 25 mg oral tablet: 1 tab(s) orally once a day (20 Jun 2022 16:27)      Vital Signs Last 24 Hrs  T(C): 36.4 (05 Jul 2022 16:01), Max: 37.5 (04 Jul 2022 19:00)  T(F): 97.5 (05 Jul 2022 16:01), Max: 99.5 (04 Jul 2022 19:00)  HR: 84 (05 Jul 2022 18:00) (69 - 100)  BP: 154/90 (05 Jul 2022 18:00) (130/81 - 159/84)  BP(mean): 106 (05 Jul 2022 18:00) (93 - 120)  RR: 17 (05 Jul 2022 18:00) (12 - 20)  SpO2: 98% (05 Jul 2022 18:00) (94% - 98%)      PHYSICAL EXAM:  Detailed Neurologic Exam:    Patient noted with moustache dressing for leakage from nares, mild sanguinous drainage noted, dressing changed, now C/D/I.  Lumbar spine midline noted with surgical sutures at site of previous lumbar drain.     Mental status: The patient is awake and alert and has normal attention span.  The patient is fully oriented in 3 spheres. The patient is oriented to current events. The patient is able to name objects, follow commands, repeat sentences.    Cranial nerves: Pupils equal and react symmetrically to light. There is no visual field deficit to confrontation. Extraocular motion is full with no nystagmus. There is no ptosis. Facial sensation is intact. Facial musculature is symmetric. Palate elevates symmetrically. Shoulder shrug is normal. Tongue is midline.    Motor: There is normal bulk and tone.  There is no tremor.  Strength is 5/5 in the RUE/RLE.   Strength is 5/5 in the LUE/LLE.    Sensation: Intact to light touch in 4 extremities    Cerebellar: deferred    Gait : deferred        LABS:                        11.9   9.19  )-----------( 401      ( 04 Jul 2022 12:53 )             36.7     07-04    141  |  104  |  6<L>  ----------------------------<  76  3.7   |  21<L>  |  0.59    Ca    9.1      04 Jul 2022 16:54  Phos  2.5     07-04  Mg     1.8     07-04    CULTURES:  Culture Results:   No growth (06-22 @ 21:51)      RADIOLOGY & ADDITIONAL STUDIES:    < from: CT Head No Cont (07.02.22 @ 09:59) >    IMPRESSION:    Similar-appearing small acute subdural hemorrhage in the posterior left   interhemispheric fissure.

## 2022-07-05 NOTE — DISCHARGE NOTE PROVIDER - NSDCCPCAREPLAN_GEN_ALL_CORE_FT
PRINCIPAL DISCHARGE DIAGNOSIS  Diagnosis: CSF leak from nose  Assessment and Plan of Treatment: s/p  endoscopic endonasal repair of CSF leak with lumbar drain

## 2022-07-06 ENCOUNTER — TRANSCRIPTION ENCOUNTER (OUTPATIENT)
Age: 47
End: 2022-07-06

## 2022-07-06 LAB
ANION GAP SERPL CALC-SCNC: 15 MMOL/L — SIGNIFICANT CHANGE UP (ref 5–17)
BLD GP AB SCN SERPL QL: SIGNIFICANT CHANGE UP
BUN SERPL-MCNC: 6.3 MG/DL — LOW (ref 8–20)
CALCIUM SERPL-MCNC: 9.5 MG/DL — SIGNIFICANT CHANGE UP (ref 8.6–10.2)
CHLORIDE SERPL-SCNC: 103 MMOL/L — SIGNIFICANT CHANGE UP (ref 98–107)
CO2 SERPL-SCNC: 18 MMOL/L — LOW (ref 22–29)
CREAT SERPL-MCNC: 0.63 MG/DL — SIGNIFICANT CHANGE UP (ref 0.5–1.3)
EGFR: 109 ML/MIN/1.73M2 — SIGNIFICANT CHANGE UP
GLUCOSE SERPL-MCNC: 93 MG/DL — SIGNIFICANT CHANGE UP (ref 70–99)
HCT VFR BLD CALC: 40.8 % — SIGNIFICANT CHANGE UP (ref 34.5–45)
HGB BLD-MCNC: 13.1 G/DL — SIGNIFICANT CHANGE UP (ref 11.5–15.5)
MAGNESIUM SERPL-MCNC: 2 MG/DL — SIGNIFICANT CHANGE UP (ref 1.6–2.6)
MCHC RBC-ENTMCNC: 27.4 PG — SIGNIFICANT CHANGE UP (ref 27–34)
MCHC RBC-ENTMCNC: 32.1 GM/DL — SIGNIFICANT CHANGE UP (ref 32–36)
MCV RBC AUTO: 85.4 FL — SIGNIFICANT CHANGE UP (ref 80–100)
PHOSPHATE SERPL-MCNC: 4.1 MG/DL — SIGNIFICANT CHANGE UP (ref 2.4–4.7)
PLATELET # BLD AUTO: 415 K/UL — HIGH (ref 150–400)
POTASSIUM SERPL-MCNC: 3.7 MMOL/L — SIGNIFICANT CHANGE UP (ref 3.5–5.3)
POTASSIUM SERPL-SCNC: 3.7 MMOL/L — SIGNIFICANT CHANGE UP (ref 3.5–5.3)
RBC # BLD: 4.78 M/UL — SIGNIFICANT CHANGE UP (ref 3.8–5.2)
RBC # FLD: 13.1 % — SIGNIFICANT CHANGE UP (ref 10.3–14.5)
SODIUM SERPL-SCNC: 136 MMOL/L — SIGNIFICANT CHANGE UP (ref 135–145)
WBC # BLD: 11.82 K/UL — HIGH (ref 3.8–10.5)
WBC # FLD AUTO: 11.82 K/UL — HIGH (ref 3.8–10.5)

## 2022-07-06 PROCEDURE — 62270 DX LMBR SPI PNXR: CPT | Mod: 59

## 2022-07-06 PROCEDURE — 99291 CRITICAL CARE FIRST HOUR: CPT | Mod: 25

## 2022-07-06 RX ORDER — POTASSIUM CHLORIDE 20 MEQ
40 PACKET (EA) ORAL ONCE
Refills: 0 | Status: COMPLETED | OUTPATIENT
Start: 2022-07-06 | End: 2022-07-06

## 2022-07-06 RX ORDER — OXYCODONE HYDROCHLORIDE 5 MG/1
10 TABLET ORAL EVERY 6 HOURS
Refills: 0 | Status: DISCONTINUED | OUTPATIENT
Start: 2022-07-06 | End: 2022-07-10

## 2022-07-06 RX ORDER — OXYCODONE HYDROCHLORIDE 5 MG/1
5 TABLET ORAL ONCE
Refills: 0 | Status: DISCONTINUED | OUTPATIENT
Start: 2022-07-06 | End: 2022-07-06

## 2022-07-06 RX ORDER — SODIUM CHLORIDE 9 MG/ML
1000 INJECTION INTRAMUSCULAR; INTRAVENOUS; SUBCUTANEOUS
Refills: 0 | Status: DISCONTINUED | OUTPATIENT
Start: 2022-07-06 | End: 2022-07-07

## 2022-07-06 RX ADMIN — OXYCODONE HYDROCHLORIDE 5 MILLIGRAM(S): 5 TABLET ORAL at 05:40

## 2022-07-06 RX ADMIN — OXYCODONE HYDROCHLORIDE 5 MILLIGRAM(S): 5 TABLET ORAL at 13:22

## 2022-07-06 RX ADMIN — OXYCODONE HYDROCHLORIDE 5 MILLIGRAM(S): 5 TABLET ORAL at 10:00

## 2022-07-06 RX ADMIN — Medication 1 SPRAY(S): at 05:08

## 2022-07-06 RX ADMIN — OXYCODONE HYDROCHLORIDE 10 MILLIGRAM(S): 5 TABLET ORAL at 21:18

## 2022-07-06 RX ADMIN — ACETAZOLAMIDE 500 MILLIGRAM(S): 250 TABLET ORAL at 05:07

## 2022-07-06 RX ADMIN — Medication 40 MILLIEQUIVALENT(S): at 05:41

## 2022-07-06 RX ADMIN — POLYETHYLENE GLYCOL 3350 17 GRAM(S): 17 POWDER, FOR SOLUTION ORAL at 12:20

## 2022-07-06 RX ADMIN — SENNA PLUS 1 TABLET(S): 8.6 TABLET ORAL at 18:36

## 2022-07-06 RX ADMIN — OXYCODONE HYDROCHLORIDE 5 MILLIGRAM(S): 5 TABLET ORAL at 11:00

## 2022-07-06 RX ADMIN — Medication 5 MILLIGRAM(S): at 21:17

## 2022-07-06 RX ADMIN — ATORVASTATIN CALCIUM 40 MILLIGRAM(S): 80 TABLET, FILM COATED ORAL at 21:18

## 2022-07-06 RX ADMIN — Medication 1 SPRAY(S): at 18:36

## 2022-07-06 RX ADMIN — OXYCODONE HYDROCHLORIDE 5 MILLIGRAM(S): 5 TABLET ORAL at 12:22

## 2022-07-06 RX ADMIN — SENNA PLUS 1 TABLET(S): 8.6 TABLET ORAL at 05:07

## 2022-07-06 RX ADMIN — OXYCODONE HYDROCHLORIDE 10 MILLIGRAM(S): 5 TABLET ORAL at 22:00

## 2022-07-06 RX ADMIN — OXYCODONE HYDROCHLORIDE 5 MILLIGRAM(S): 5 TABLET ORAL at 05:10

## 2022-07-06 RX ADMIN — Medication 1 SPRAY(S): at 12:21

## 2022-07-06 NOTE — PROGRESS NOTE ADULT - ASSESSMENT
ASSESSMENT/PLAN:     NEURO:   SBP<160  q4hr neurochecks  diamox 500mg bid  LP by neurorads to eval opening pressure, considering VPS  pain mgt: tylenol and oxy 5 prn  home topamax  Activity: [x] mobilize as tolerated [] Bedrest [x] PT [x] OT [] PMNR    PULM: room air   SpO2>92%  incentive spirometry   OOB    CV: atorvastatin  SBP goal <160    RENAL: monitor I/O  replete lytes prn  voiding  Fluids: IVF while NPO    GI:  Diet: advance diet as tolerated  GI prophylaxis [x] not indicated   zofran prn for nausea  Bowel regimen [x] senna [] other:    ENDO:   Goal euglycemia (-180)    HEME/ONC:  VTE prophylaxis: [] SCDs [x] chemoprophylaxis     ID: afebrile   no leukocytosis    MISC:    I affirm that this patient is critically ill and at high risk for sudden, fatal deterioration due to one or more of the above stated active issues.     This time does not include bedside procedures that are documented separately.   ASSESSMENT/PLAN: 46y old  Female who presents with a chief complaint of CSF leak and meningitis    NEURO:   SBP<160  q4hr neurochecks  diamox 500mg bid  LP to eval opening pressure, considering VPS  pain mgt: tylenol and oxy 5 prn  home topamax  Activity: [x] mobilize as tolerated [] Bedrest [x] PT [x] OT [] PMNR    PULM: room air   SpO2>92%  incentive spirometry   OOB    CV: atorvastatin  SBP goal <160    RENAL: monitor I/O  replete lytes prn  voiding  Fluids: IVF while NPO    GI:  Diet: advance diet as tolerated (vegan)  GI prophylaxis [x] not indicated   zofran prn for nausea  Bowel regimen [x] senna [] other:    ENDO:   Goal euglycemia (-180)    HEME/ONC:  VTE prophylaxis: [] SCDs [x] chemoprophylaxis     ID: afebrile   no leukocytosis    MISC:    I affirm that this patient is critically ill and at high risk for sudden, fatal deterioration due to one or more of the above stated active issues.     This time does not include bedside procedures that are documented separately.

## 2022-07-06 NOTE — PROGRESS NOTE ADULT - SUBJECTIVE AND OBJECTIVE BOX
Chief complaint:   Patient is a 46y old  Female who presents with a chief complaint of CSF leak (06 Jul 2022 00:19)    HPI:  46yoF PMH HLD, chronic back pain, migraines, transferred from [facility] p/o from repair of CSF leak. Pt initially presented to [facility] on 6/21 w/ L nare nasal drainage since after a COVID swab 5/28. Pt needed swab performed to travel to [place]. While in [place], pt had persistent rhinorrhea since COVID swab, associated fever, HA, cough and generalized weakness. 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 Pt completed 1 week of augmentin. [place] 6[place] and f/u w/ neurology/Dr Adames who sent her for further evaluation. She continued to have fevers up to 102 and came to [facility] ED on 6/20. CTH showed Mildly low-lying cerebellar tonsils. Given Ceftriaxone & Vanco x1. 6/22 ID consulted and started on Cefepime and Vanco. MRI brain +/- & MRV showed  Unremarkable MRI of the brain with and without contrast. Fluid in the left ethmoid and maxillary sinus may be related to CSF leak versus sinusitis. Recommend CT cisternogram for further evaluation. Normal intracranial MR venogram. 6/23 CSF PCR + enterovirus. 6/25 CT maxillofacial Thinning of the cribriform plate with fluid in the left ethmoid and maxillary sinus likely related to CSF leak. 6/28 Transferred to [facility] for surgical management coordinated w/ ENT. 6/29 OR for endoscopic endonasal repair of CSF leak w/ skull base reconstruction and placement of LD. LD @10cc/hr. P/o c/o frontal HA improved w/ pain medication. No further nasal drainage or salty/metallic taste. 7/1 CTH very small interhemispheric SDH stable on subsequent CTH 7/2. 7/5 LD removed and transferred back to [facility] ICU. Currently c/o mild L temporal HA worse w/ lying down.  (05 Jul 2022 15:41)        24hr EVENTS:      ROS: [ ]  Unable to assess due to mental status   All other systems negative    -----------------------------------------------------------------------------------------------------------------------------------------------------------------------------------  ICU Vital Signs Last 24 Hrs  T(C): 36.6 (06 Jul 2022 07:46), Max: 36.6 (05 Jul 2022 11:00)  T(F): 97.9 (06 Jul 2022 07:46), Max: 97.9 (05 Jul 2022 11:00)  HR: 66 (06 Jul 2022 08:00) (66 - 91)  BP: 137/80 (06 Jul 2022 08:00) (129/69 - 159/84)  BP(mean): 98 (06 Jul 2022 08:00) (87 - 120)  ABP: --  ABP(mean): --  RR: 16 (06 Jul 2022 08:00) (9 - 19)  SpO2: 93% (06 Jul 2022 08:00) (93% - 99%)      I&O's Summary      MEDICATIONS  (STANDING):  acetaZOLAMIDE    Tablet 500 milliGRAM(s) Oral two times a day  atorvastatin 40 milliGRAM(s) Oral at bedtime  melatonin 5 milliGRAM(s) Oral at bedtime  polyethylene glycol 3350 17 Gram(s) Oral daily  senna 1 Tablet(s) Oral two times a day  sodium chloride 0.65% Nasal 1 Spray(s) Both Nostrils four times a day  topiramate 25 milliGRAM(s) Oral daily      RESPIRATORY:        IMAGING:   Recent imaging studies were reviewed.    LAB RESULTS:                          13.1   11.82 )-----------( 415      ( 06 Jul 2022 04:45 )             40.8           07-06    136  |  103  |  6.3<L>  ----------------------------<  93  3.7   |  18.0<L>  |  0.63    Ca    9.5      06 Jul 2022 04:45  Phos  4.1     07-06  Mg     2.0     07-06          -----------------------------------------------------------------------------------------------------------------------------------------------------------------------------------    PHYSICAL EXAM:  General: Calm, laying in bed  HEENT: MMM  Neuro:  -Mental status- No acute distress, AOx3, conversational, following commands  -CN- PERRL 3mm, EOMI, tongue midline, face symmetric  -Motor- full strength in all ext  -Sensation- intact to LT   -Coordination- no dysmetria noted    CV:   Pulm: Clear to auscultation  Abd: Soft, nontender, nondistended  Ext: No edema  Skin: warm, dry     Chief complaint:   Patient is a 46y old  Female who presents with a chief complaint of CSF leak (06 Jul 2022 00:19)    HPI:  46yoF PMH HLD, chronic back pain, migraines, transferred from [facility] p/o from repair of CSF leak. Pt initially presented to [facility] on 6/21 w/ L nare nasal drainage since after a COVID swab 5/28. Pt needed swab performed to travel to [place]. While in [place], pt had persistent rhinorrhea since COVID swab, associated fever, HA, cough and generalized weakness. 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 Pt completed 1 week of augmentin. [place] 6[place] and f/u w/ neurology/Dr Adames who sent her for further evaluation. She continued to have fevers up to 102 and came to [facility] ED on 6/20. CTH showed Mildly low-lying cerebellar tonsils. Given Ceftriaxone & Vanco x1. 6/22 ID consulted and started on Cefepime and Vanco. MRI brain +/- & MRV showed  Unremarkable MRI of the brain with and without contrast. Fluid in the left ethmoid and maxillary sinus may be related to CSF leak versus sinusitis. Recommend CT cisternogram for further evaluation. Normal intracranial MR venogram. 6/23 CSF PCR + enterovirus. 6/25 CT maxillofacial Thinning of the cribriform plate with fluid in the left ethmoid and maxillary sinus likely related to CSF leak. 6/28 Transferred to [facility] for surgical management coordinated w/ ENT. 6/29 OR for endoscopic endonasal repair of CSF leak w/ skull base reconstruction and placement of LD. LD @10cc/hr. P/o c/o frontal HA improved w/ pain medication. No further nasal drainage or salty/metallic taste. 7/1 CTH very small interhemispheric SDH stable on subsequent CTH 7/2. 7/5 LD removed and transferred back to [facility] ICU. Currently c/o mild L temporal HA worse w/ lying down.  (05 Jul 2022 15:41)    24hr EVENTS:  no acute issues    ROS: mild headache  All other systems negative    -----------------------------------------------------------------------------------------------------------------------------------------------------------------------------------  ICU Vital Signs Last 24 Hrs  T(C): 36.6 (06 Jul 2022 07:46), Max: 36.6 (05 Jul 2022 11:00)  T(F): 97.9 (06 Jul 2022 07:46), Max: 97.9 (05 Jul 2022 11:00)  HR: 66 (06 Jul 2022 08:00) (66 - 91)  BP: 137/80 (06 Jul 2022 08:00) (129/69 - 159/84)  BP(mean): 98 (06 Jul 2022 08:00) (87 - 120)  ABP: --  ABP(mean): --  RR: 16 (06 Jul 2022 08:00) (9 - 19)  SpO2: 93% (06 Jul 2022 08:00) (93% - 99%)      I&O's Summary      MEDICATIONS  (STANDING):  acetaZOLAMIDE    Tablet 500 milliGRAM(s) Oral two times a day  atorvastatin 40 milliGRAM(s) Oral at bedtime  melatonin 5 milliGRAM(s) Oral at bedtime  polyethylene glycol 3350 17 Gram(s) Oral daily  senna 1 Tablet(s) Oral two times a day  sodium chloride 0.65% Nasal 1 Spray(s) Both Nostrils four times a day  topiramate 25 milliGRAM(s) Oral daily      IMAGING:   Recent imaging studies were reviewed.    LAB RESULTS:                          13.1   11.82 )-----------( 415      ( 06 Jul 2022 04:45 )             40.8     07-06    136  |  103  |  6.3<L>  ----------------------------<  93  3.7   |  18.0<L>  |  0.63    Ca    9.5      06 Jul 2022 04:45  Phos  4.1     07-06  Mg     2.0     07-06    -----------------------------------------------------------------------------------------------------------------------------------------------------------------------------------    PHYSICAL EXAM:  General: Calm, laying in bed  HEENT: MMM  Neuro:  -Mental status- No acute distress, AOx3, conversational, following commands  -CN- PERRL 3mm, EOMI, tongue midline, face symmetric  -Motor- full strength in all ext  -Sensation- intact to LT   -Coordination- no dysmetria noted    CV:   Pulm: Clear to auscultation  Abd: Soft, nontender, nondistended  Ext: No edema  Skin: warm, dry

## 2022-07-06 NOTE — PROGRESS NOTE ADULT - ASSESSMENT
Assessment:  46F who presented after COVID swab with persistent rhinorrhea, found to have CSF leak. Tx to [facility] for endoscopic endonasal repair of CSF leak with lumbar drain on 6/29. Lumbar drain removed 7/5 with high pressure CSF outflow from removal site. Tx back to [facility] 7/5 for further care.  - POD 8  - No drainage on gauze at lumbar drain incision  - Mild headache controlled by PRN medications      Plan:   - Q4 hour neuro checks  - SBP goal 100-150  - Transphenoidal precautions in place   - Diamox 500 BID to reduce CSF production to reduce risk of leak  - Possible LP later in week to measure opening pressure  - May require shunt pending course and LP results if performed  - [facility] brainlab protocol pending  - DVT prophylaxis: SCDs only, no lovenox 2/2 possible LP and/or shunt later this week  - Pain control PRN, avoid oversedation  - PT/OT/OOB to chair, maintain HOB @ 30 degrees minimum  - Incentive spirometry  - Further care per NSICU team  - Final plan pending morning rounds with attending

## 2022-07-06 NOTE — PROGRESS NOTE ADULT - NS ATTEND AMEND GEN_ALL_CORE FT
I had an extensive discussion with the pt and her daughter after the LP and the measurement of the opening pressure. 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 I offered  shunt placement in order to treat the high ICP and prevent CSF leak. The indications, alternatives, and risks of the procedure including but not limited to infection, hemorrhage, shunt malfunction, stroke, seizures, fail to position the central catheter inside the slit ventricle and need for reoperation, DVT, PE, MI were explained to the pt in details. All questions were answered. The pt signed the consent.

## 2022-07-06 NOTE — PROGRESS NOTE ADULT - SUBJECTIVE AND OBJECTIVE BOX
Patient is a 46y old  Female who presents with a chief complaint of CSF leak (05 Jul 2022 18:16)    HPI:  46yoF PMH HLD, chronic back pain, migraines, transferred from [facility] p/o from repair of CSF leak. Pt initially presented to [facility] on 6/21 w/ L nare nasal drainage since after a COVID swab 5/28. Pt needed swab performed to travel to [place]. While in [place], pt had persistent rhinorrhea since COVID swab, associated fever, HA, cough and generalized weakness. 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 Pt completed 1 week of augmentin. [place] 6[place] and f/u w/ neurology/Dr Adames who sent her for further evaluation. She continued to have fevers up to 102 and came to [facility] ED on 6/20. CTH showed Mildly low-lying cerebellar tonsils. Given Ceftriaxone & Vanco x1. 6/22 ID consulted and started on Cefepime and Vanco. MRI brain +/- & MRV showed  Unremarkable MRI of the brain with and without contrast. Fluid in the left ethmoid and maxillary sinus may be related to CSF leak versus sinusitis. Recommend CT cisternogram for further evaluation. Normal intracranial MR venogram. 6/23 CSF PCR + enterovirus. 6/25 CT maxillofacial Thinning of the cribriform plate with fluid in the left ethmoid and maxillary sinus likely related to CSF leak. 6/28 Transferred to [facility] for surgical management coordinated w/ ENT. 6/29 OR for endoscopic endonasal repair of CSF leak w/ skull base reconstruction and placement of LD. LD @10cc/hr. P/o c/o frontal HA improved w/ pain medication. No further nasal drainage or salty/metallic taste. 7/1 CTH very small interhemispheric SDH stable on subsequent CTH 7/2. 7/5 LD removed and transferred back to [facility] ICU. Currently c/o mild L temporal HA worse w/ lying down.  (05 Jul 2022 15:41)      Interval history:  Patient seen and examined by neurosurgical team. Patient transferred from [facility] to [facility] today after removal of lumbar drain with high-flow output s/p endoscopic endonasal repair of CSF leak on 6/29. Initiated diamox to reduce CSF production. Patient complaining of mild headache new today, reports it is easily managed with PRN medications. No other complaints at this time. No events reported by staff.       Vital Signs Last 24 Hrs  T(C): 36.6 (05 Jul 2022 20:01), Max: 36.7 (05 Jul 2022 07:00)  T(F): 97.8 (05 Jul 2022 20:01), Max: 98 (05 Jul 2022 07:00)  HR: 69 (05 Jul 2022 20:00) (69 - 86)  BP: 139/77 (05 Jul 2022 20:00) (139/77 - 159/84)  BP(mean): 97 (05 Jul 2022 20:00) (93 - 120)  RR: 15 (05 Jul 2022 20:00) (12 - 19)  SpO2: 97% (05 Jul 2022 20:00) (94% - 99%)      Physical Exam:  Constitutional: NAD, lying in bed  Neuro  * Mental Status:  GCS 15: Awake, alert, oriented to conversation. No aphasia or difficulty speaking. No dysarthria.   * Cranial Nerves: Cnii-Cnxii grossly intact. PERRL, EOMI, tongue midline, no gaze deviation  * Motor: RUE 5/5, LUE 5/5, RLE 5/5, LLE 5/5, no drift   * Sensory: Sensation intact to light touch  * Reflexes: not assessed   Skin: lumbar incision stitched, no drainage on gauze   Head: mustache dressing in place, minimal drainage on gauze       LABS:                        11.9   9.19  )-----------( 401      ( 04 Jul 2022 12:53 )             36.7     07-04    141  |  104  |  6<L>  ----------------------------<  76  3.7   |  21<L>  |  0.59    Ca    9.1      04 Jul 2022 16:54  Phos  2.5     07-04  Mg     1.8     07-04    CULTURES:  Culture Results:   No growth (06-22 @ 21:51)      Medications;  MEDICATIONS  (STANDING):  acetaZOLAMIDE    Tablet 500 milliGRAM(s) Oral two times a day  atorvastatin 40 milliGRAM(s) Oral at bedtime  melatonin 5 milliGRAM(s) Oral at bedtime  polyethylene glycol 3350 17 Gram(s) Oral daily  senna 1 Tablet(s) Oral two times a day  sodium chloride 0.65% Nasal 1 Spray(s) Both Nostrils four times a day  topiramate 25 milliGRAM(s) Oral daily    MEDICATIONS  (PRN):  acetaminophen     Tablet .. 650 milliGRAM(s) Oral every 4 hours PRN Temp greater or equal to 38C (100.4F), Mild Pain (1 - 3)  cyclobenzaprine 10 milliGRAM(s) Oral three times a day PRN Muscle Spasm  oxyCODONE    IR 5 milliGRAM(s) Oral every 4 hours PRN Moderate Pain (4 - 6)      RADIOLOGY & ADDITIONAL STUDIES:  No new neurosurgical imaging to review.     < from: CT Head No Cont (07.02.22 @ 09:59) >  IMPRESSION:    Similar-appearing small acute subdural hemorrhage in the posterior left   interhemispheric fissure.    --- End of Report ---    SILVANO MILLER MD; Attending Radiologist  This document has been electronically signed. Jul 2 2022 10:11AM    < end of copied text >

## 2022-07-06 NOTE — PROCEDURE NOTE - NSTIMEOUT_GEN_A_CORE
Patient's first and last name, , procedure, and correct site confirmed prior to the start of procedure.
68

## 2022-07-07 ENCOUNTER — APPOINTMENT (OUTPATIENT)
Dept: NEUROSURGERY | Facility: HOSPITAL | Age: 47
End: 2022-07-07

## 2022-07-07 ENCOUNTER — TRANSCRIPTION ENCOUNTER (OUTPATIENT)
Age: 47
End: 2022-07-07

## 2022-07-07 LAB
ANION GAP SERPL CALC-SCNC: 18 MMOL/L — HIGH (ref 5–17)
BUN SERPL-MCNC: 9.5 MG/DL — SIGNIFICANT CHANGE UP (ref 8–20)
CALCIUM SERPL-MCNC: 9.5 MG/DL — SIGNIFICANT CHANGE UP (ref 8.6–10.2)
CHLORIDE SERPL-SCNC: 99 MMOL/L — SIGNIFICANT CHANGE UP (ref 98–107)
CO2 SERPL-SCNC: 18 MMOL/L — LOW (ref 22–29)
CREAT SERPL-MCNC: 0.74 MG/DL — SIGNIFICANT CHANGE UP (ref 0.5–1.3)
EGFR: 100 ML/MIN/1.73M2 — SIGNIFICANT CHANGE UP
GLUCOSE SERPL-MCNC: 102 MG/DL — HIGH (ref 70–99)
HCG SERPL QL: NEGATIVE — SIGNIFICANT CHANGE UP
HCT VFR BLD CALC: 37.6 % — SIGNIFICANT CHANGE UP (ref 34.5–45)
HGB BLD-MCNC: 12.2 G/DL — SIGNIFICANT CHANGE UP (ref 11.5–15.5)
MAGNESIUM SERPL-MCNC: 1.9 MG/DL — SIGNIFICANT CHANGE UP (ref 1.6–2.6)
MCHC RBC-ENTMCNC: 27.4 PG — SIGNIFICANT CHANGE UP (ref 27–34)
MCHC RBC-ENTMCNC: 32.4 GM/DL — SIGNIFICANT CHANGE UP (ref 32–36)
MCV RBC AUTO: 84.3 FL — SIGNIFICANT CHANGE UP (ref 80–100)
PHOSPHATE SERPL-MCNC: 4.3 MG/DL — SIGNIFICANT CHANGE UP (ref 2.4–4.7)
PLATELET # BLD AUTO: 301 K/UL — SIGNIFICANT CHANGE UP (ref 150–400)
POTASSIUM SERPL-MCNC: 4 MMOL/L — SIGNIFICANT CHANGE UP (ref 3.5–5.3)
POTASSIUM SERPL-SCNC: 4 MMOL/L — SIGNIFICANT CHANGE UP (ref 3.5–5.3)
RBC # BLD: 4.46 M/UL — SIGNIFICANT CHANGE UP (ref 3.8–5.2)
RBC # FLD: 13.2 % — SIGNIFICANT CHANGE UP (ref 10.3–14.5)
SARS-COV-2 RNA SPEC QL NAA+PROBE: SIGNIFICANT CHANGE UP
SODIUM SERPL-SCNC: 135 MMOL/L — SIGNIFICANT CHANGE UP (ref 135–145)
WBC # BLD: 10.62 K/UL — HIGH (ref 3.8–10.5)
WBC # FLD AUTO: 10.62 K/UL — HIGH (ref 3.8–10.5)

## 2022-07-07 PROCEDURE — 62223 ESTABLISH BRAIN CAVITY SHUNT: CPT | Mod: 62

## 2022-07-07 PROCEDURE — 61781 SCAN PROC CRANIAL INTRA: CPT | Mod: 82,78

## 2022-07-07 PROCEDURE — 70450 CT HEAD/BRAIN W/O DYE: CPT | Mod: 26,77

## 2022-07-07 PROCEDURE — 62223 ESTABLISH BRAIN CAVITY SHUNT: CPT | Mod: 62,78

## 2022-07-07 PROCEDURE — 61781 SCAN PROC CRANIAL INTRA: CPT | Mod: 78

## 2022-07-07 PROCEDURE — 99291 CRITICAL CARE FIRST HOUR: CPT

## 2022-07-07 PROCEDURE — 70450 CT HEAD/BRAIN W/O DYE: CPT | Mod: 26

## 2022-07-07 PROCEDURE — 62223 ESTABLISH BRAIN CAVITY SHUNT: CPT | Mod: 82,78

## 2022-07-07 DEVICE — VALVE STRATA PROGRAMABLE: Type: IMPLANTABLE DEVICE | Status: FUNCTIONAL

## 2022-07-07 DEVICE — SEALANT FLOSEAL FAST PREP HEMOSTATIC MATRIX 10ML: Type: IMPLANTABLE DEVICE | Status: FUNCTIONAL

## 2022-07-07 DEVICE — IMPLANTABLE DEVICE: Type: IMPLANTABLE DEVICE | Status: FUNCTIONAL

## 2022-07-07 DEVICE — SPONGE HSTAT SURGICEL 2X14": Type: IMPLANTABLE DEVICE | Status: FUNCTIONAL

## 2022-07-07 DEVICE — CATH VENT STD 23CM: Type: IMPLANTABLE DEVICE | Status: FUNCTIONAL

## 2022-07-07 RX ORDER — CHLORHEXIDINE GLUCONATE 213 G/1000ML
1 SOLUTION TOPICAL DAILY
Refills: 0 | Status: DISCONTINUED | OUTPATIENT
Start: 2022-07-07 | End: 2022-07-10

## 2022-07-07 RX ORDER — TOPIRAMATE 25 MG
1 TABLET ORAL
Qty: 0 | Refills: 0 | DISCHARGE

## 2022-07-07 RX ORDER — ONDANSETRON 8 MG/1
4 TABLET, FILM COATED ORAL EVERY 6 HOURS
Refills: 0 | Status: DISCONTINUED | OUTPATIENT
Start: 2022-07-07 | End: 2022-07-10

## 2022-07-07 RX ORDER — CEFAZOLIN SODIUM 1 G
2000 VIAL (EA) INJECTION EVERY 8 HOURS
Refills: 0 | Status: DISCONTINUED | OUTPATIENT
Start: 2022-07-07 | End: 2022-07-09

## 2022-07-07 RX ORDER — SODIUM CHLORIDE 9 MG/ML
500 INJECTION INTRAMUSCULAR; INTRAVENOUS; SUBCUTANEOUS ONCE
Refills: 0 | Status: COMPLETED | OUTPATIENT
Start: 2022-07-07 | End: 2022-07-07

## 2022-07-07 RX ORDER — ACETAMINOPHEN 500 MG
1000 TABLET ORAL ONCE
Refills: 0 | Status: COMPLETED | OUTPATIENT
Start: 2022-07-07 | End: 2022-07-07

## 2022-07-07 RX ORDER — PANTOPRAZOLE SODIUM 20 MG/1
40 TABLET, DELAYED RELEASE ORAL DAILY
Refills: 0 | Status: DISCONTINUED | OUTPATIENT
Start: 2022-07-07 | End: 2022-07-07

## 2022-07-07 RX ORDER — LEVETIRACETAM 250 MG/1
500 TABLET, FILM COATED ORAL EVERY 12 HOURS
Refills: 0 | Status: DISCONTINUED | OUTPATIENT
Start: 2022-07-07 | End: 2022-07-07

## 2022-07-07 RX ORDER — ATORVASTATIN CALCIUM 80 MG/1
1 TABLET, FILM COATED ORAL
Qty: 0 | Refills: 0 | DISCHARGE

## 2022-07-07 RX ORDER — NICARDIPINE HYDROCHLORIDE 30 MG/1
5 CAPSULE, EXTENDED RELEASE ORAL
Qty: 40 | Refills: 0 | Status: DISCONTINUED | OUTPATIENT
Start: 2022-07-07 | End: 2022-07-08

## 2022-07-07 RX ORDER — LABETALOL HCL 100 MG
5 TABLET ORAL EVERY 4 HOURS
Refills: 0 | Status: DISCONTINUED | OUTPATIENT
Start: 2022-07-07 | End: 2022-07-08

## 2022-07-07 RX ORDER — OXYCODONE HYDROCHLORIDE 5 MG/1
1 TABLET ORAL
Qty: 0 | Refills: 0 | DISCHARGE

## 2022-07-07 RX ORDER — HYDRALAZINE HCL 50 MG
5 TABLET ORAL EVERY 4 HOURS
Refills: 0 | Status: DISCONTINUED | OUTPATIENT
Start: 2022-07-07 | End: 2022-07-08

## 2022-07-07 RX ADMIN — Medication 1 SPRAY(S): at 00:37

## 2022-07-07 RX ADMIN — Medication 1 SPRAY(S): at 12:12

## 2022-07-07 RX ADMIN — Medication 5 MILLIGRAM(S): at 21:40

## 2022-07-07 RX ADMIN — Medication 100 MILLIGRAM(S): at 21:41

## 2022-07-07 RX ADMIN — Medication 1 SPRAY(S): at 17:20

## 2022-07-07 RX ADMIN — Medication 400 MILLIGRAM(S): at 23:14

## 2022-07-07 RX ADMIN — Medication 100 MILLIGRAM(S): at 14:17

## 2022-07-07 RX ADMIN — SODIUM CHLORIDE 70 MILLILITER(S): 9 INJECTION INTRAMUSCULAR; INTRAVENOUS; SUBCUTANEOUS at 12:06

## 2022-07-07 RX ADMIN — OXYCODONE HYDROCHLORIDE 5 MILLIGRAM(S): 5 TABLET ORAL at 22:40

## 2022-07-07 RX ADMIN — OXYCODONE HYDROCHLORIDE 10 MILLIGRAM(S): 5 TABLET ORAL at 12:05

## 2022-07-07 RX ADMIN — SODIUM CHLORIDE 70 MILLILITER(S): 9 INJECTION INTRAMUSCULAR; INTRAVENOUS; SUBCUTANEOUS at 01:36

## 2022-07-07 RX ADMIN — POLYETHYLENE GLYCOL 3350 17 GRAM(S): 17 POWDER, FOR SOLUTION ORAL at 12:15

## 2022-07-07 RX ADMIN — SENNA PLUS 1 TABLET(S): 8.6 TABLET ORAL at 17:20

## 2022-07-07 RX ADMIN — Medication 1000 MILLIGRAM(S): at 23:29

## 2022-07-07 RX ADMIN — Medication 1 DROP(S): at 21:44

## 2022-07-07 RX ADMIN — ATORVASTATIN CALCIUM 40 MILLIGRAM(S): 80 TABLET, FILM COATED ORAL at 21:40

## 2022-07-07 RX ADMIN — OXYCODONE HYDROCHLORIDE 5 MILLIGRAM(S): 5 TABLET ORAL at 21:40

## 2022-07-07 RX ADMIN — Medication 5 MILLIGRAM(S): at 21:41

## 2022-07-07 RX ADMIN — OXYCODONE HYDROCHLORIDE 10 MILLIGRAM(S): 5 TABLET ORAL at 13:05

## 2022-07-07 RX ADMIN — CHLORHEXIDINE GLUCONATE 1 APPLICATION(S): 213 SOLUTION TOPICAL at 12:21

## 2022-07-07 RX ADMIN — SODIUM CHLORIDE 500 MILLILITER(S): 9 INJECTION INTRAMUSCULAR; INTRAVENOUS; SUBCUTANEOUS at 20:00

## 2022-07-07 NOTE — PROGRESS NOTE ADULT - SUBJECTIVE AND OBJECTIVE BOX
Chief complaint:   Patient is a 46y old  Female who presents with a chief complaint of CSF leak (07 Jul 2022 00:12)    HPI:  46yoF PMH HLD, chronic back pain, migraines, transferred from [facility] p/o from repair of CSF leak. Pt initially presented to [facility] on 6/21 w/ L nare nasal drainage since after a COVID swab 5/28. Pt needed swab performed to travel to [place]. While in [place], pt had persistent rhinorrhea since COVID swab, associated fever, HA, cough and generalized weakness. 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 Pt completed 1 week of augmentin. [place] and f/u w/ neurology/Dr Adames who sent her for further evaluation. She continued to have fevers up to 102 and came to [facility] ED on 6/20. CTH showed Mildly low-lying cerebellar tonsils. Given Ceftriaxone & Vanco x1. 6/22 ID consulted and started on Cefepime and Vanco. MRI brain +/- & MRV showed  Unremarkable MRI of the brain with and without contrast. Fluid in the left ethmoid and maxillary sinus may be related to CSF leak versus sinusitis. Recommend CT cisternogram for further evaluation. Normal intracranial MR venogram. 6/23 CSF PCR + enterovirus. 6/25 CT maxillofacial Thinning of the cribriform plate with fluid in the left ethmoid and maxillary sinus likely related to CSF leak. 6/28 Transferred to [facility] for surgical management coordinated w/ ENT. 6/29 OR for endoscopic endonasal repair of CSF leak w/ skull base reconstruction and placement of LD. LD @10cc/hr. P/o c/o frontal HA improved w/ pain medication. No further nasal drainage or salty/metallic taste. 7/1 CTH very small interhemispheric SDH stable on subsequent CTH 7/2. 7/5 LD removed and transferred back to [facility] ICU. Currently c/o mild L temporal HA worse w/ lying down.  (05 Jul 2022 15:41)    24hr EVENTS:  LP with high OP    ROS: mild headache  All other systems negative    -----------------------------------------------------------------------------------------------------------------------------------------------------------------------------------  ICU Vital Signs Last 24 Hrs  T(C): 36.6 (07 Jul 2022 04:08), Max: 36.8 (06 Jul 2022 16:01)  T(F): 97.9 (07 Jul 2022 04:08), Max: 98.3 (06 Jul 2022 16:01)  HR: 85 (07 Jul 2022 06:00) (67 - 97)  BP: 153/105 (07 Jul 2022 06:00) (115/65 - 153/105)  BP(mean): 117 (07 Jul 2022 06:00) (81 - 117)  ABP: --  ABP(mean): --  RR: 22 (07 Jul 2022 06:00) (13 - 22)  SpO2: 97% (07 Jul 2022 05:00) (95% - 98%)      I&O's Summary    06 Jul 2022 07:01  -  07 Jul 2022 07:00  --------------------------------------------------------  IN: 560 mL / OUT: 0 mL / NET: 560 mL        MEDICATIONS  (STANDING):  atorvastatin 40 milliGRAM(s) Oral at bedtime  melatonin 5 milliGRAM(s) Oral at bedtime  polyethylene glycol 3350 17 Gram(s) Oral daily  senna 1 Tablet(s) Oral two times a day  sodium chloride 0.65% Nasal 1 Spray(s) Both Nostrils four times a day  sodium chloride 0.9%. 1000 milliLiter(s) (70 mL/Hr) IV Continuous <Continuous>  topiramate 25 milliGRAM(s) Oral daily      IMAGING:   Recent imaging studies were reviewed.    LAB RESULTS:                          12.2   10.62 )-----------( 301      ( 07 Jul 2022 04:07 )             37.6           07-07    135  |  99  |  9.5  ----------------------------<  102<H>  4.0   |  18.0<L>  |  0.74    Ca    9.5      07 Jul 2022 04:07  Phos  4.3     07-07  Mg     1.9     07-07      -----------------------------------------------------------------------------------------------------------------------------------------------------------------------------------    PHYSICAL EXAM:  General: Calm, laying in bed  HEENT: MMM  Neuro:  -Mental status- No acute distress, AOx3, conversational, following commands  -CN- PERRL 3mm, EOMI, tongue midline, face symmetric  -Motor- full strength in all ext  -Sensation- intact to LT   -Coordination- no dysmetria noted    CV:   Pulm: Clear to auscultation  Abd: Soft, nontender, nondistended  Ext: No edema  Skin: warm, dry

## 2022-07-07 NOTE — BRIEF OPERATIVE NOTE - OPERATION/FINDINGS
Shunt functioning well, placed intraperitoneal with no kinks noted. See operative note for additional findings.
Pulsatile CSF w/ first pass of catheter and tunneled to peritoneum. Distal catheter secured by general surgeon and resident
Pulsatile CSF w/ first pass of catheter tunneled to peritoneum. Distal catheter secured by general surgeon and resident

## 2022-07-07 NOTE — DIETITIAN INITIAL EVALUATION ADULT - CONTINUE CURRENT NUTRITION CARE PLAN
- resume po diet as medically feasible/tolerable. If pt with suboptimal po intake, add Ensure Plant TID (180 kcal, 20g protein per serving)./yes

## 2022-07-07 NOTE — PHYSICAL THERAPY INITIAL EVALUATION ADULT - ASR EQUIP NEEDS DISCH PT EVAL
[facility]
[place]   [address]                     HISTORY AND PHYSICAL          
_______________________________________________________________________________
 
Name:       NAMAN JUAREZBRITT AVILA            Room #:         510-P       [facility] [place]  
..#:      [id number]                       Account #:      [id number]  
Admission:  08/20/20    Attend Phys:    Vini Barnes MD 
Discharge:  08/26/20    Date of Birth:  [birth year]  
                                                          Report #: [id number]
                                                                    [id number]   
_______________________________________________________________________________
THIS REPORT FOR:  
 
cc:  Lester Joyce,Vini Bailey MD                                         ~
CC: Vini Joyce
 
DATE OF SERVICE:  08/21/2020
 
 
HISTORY AND PHYSICAL/POSTADMISSION PHYSICIAN EVALUATION
 
HISTORY OF PRESENT ILLNESS:  The patient is a 66-year-old -American male
admitted to [facility] for increased shortness of breath.  He had
a right total hip arthroplasty on 07/28/2020 at [facility] and was
discharged to a skilled nursing facility where he had been residing.  He saw his
pulmonary doctor and was complaining of increased shortness of breath and was
sent to the Emergency Room where he was found to have acute hypoxic respiratory
failure, pneumonia, left upper lobe pulmonary embolism.  He was started on
Lovenox given O2 and oral doxycycline.  He was also given IV Lasix for lower
extremity edema.  Followed closely by the Pulmonary Service and the hospitalist
service.  He was felt to be ready and has now been admitted for acute
in-hospital inpatient rehabilitation.
 
As far his past medical history, allergies, home setting, habits, please see the
full admission history and physical documentation.  He has been a half pack a
day smoker.
 
SOCIAL HISTORY:  Does live with his wife could stay on the ground level with
half
bathroom.  Wife does not work.  He has got 13 steps up to his regular bedroom.
 
MEDICATIONS:  Please see the full medication listing.
 
REVIEW OF SYSTEMS:  See the full listing as noted.  He denied any chest pain,
shortness of breath or abdominal discomfort.  Notes that he follows with Dr. Camarena
 for cardiac issues and apparently typically tends to be tachycardic.
 
PHYSICAL EXAMINATION:
GENERAL:  A 66-year-old -American male in no obvious distress.
VITAL SIGNS:  Last recorded temperature 97.8, pulse 109, respirations 22, blood
pressure 130/79.
NEUROLOGIC:  He is alert.  He is pleasant.
HEENT:  Appeared to be benign.  He is on room air.  Typically uses O2 with his
CPAP.  Facies are symmetric.
 
 
 
[facility]
[address] [place]   [address]                     HISTORY AND PHYSICAL          
_______________________________________________________________________________
 
Name:       ENEDELIA JUAREZ            Room #:         510-P       [facility] [place]  
[facility].#:      [id number]                       Account #:      [id number]  
Admission:  08/20/20    Attend Phys:    Vini Barnes MD 
Discharge:  08/26/20    Date of Birth:  [birth year]  
                                                          Report #: [id number]
                                                                    [id number]   
_______________________________________________________________________________
CHEST:  Sounded clear.  He might have some decreased diffuse breath sounds.
CARDIOVASCULAR:  Regular rate and rhythm.
ABDOMEN:  Bowel sounds positive, nontender.
GENITOURINARY AND RECTAL:  Deferred.
EXTREMITIES:  Functional range of motion of both upper extremities, 
appeared equal.  No focal calf swelling.  Prior right hip staples are noted.  He
is sit to stand, standby assistance.  He is ambulating short distances with a
front-wheeled walker.
 
ASSESSMENT:  A 66-year-old -American male with the following problem
list:
1.  Pulmonary rehabilitation.
2.  Acute hypoxic respiratory failure.
3.  Recent right total hip arthroplasty for degenerative arthritis on
07/28/2020, allowed weightbearing as tolerated.
4.  Left upper lobe pneumonia.
5.  Healthcare-associated pneumonia.
6.  Lumbar radiculopathy.
7.  Hypertension.
8.  Chronic obstructive pulmonary disease.
9.  Obesity.
 
PLAN:  The patient has been admitted for acute inpatient rehabilitation.  From a
postadmission physician evaluation perspective, there are no relevant changes
since the preadmission screening.  Please see the review of prior and current
medical and functional conditions and comorbidities.  Please see the patient's
previous and current functional status.  As far as risk of complications, he
does have the multiple medical comorbidities as noted above.  Initial plan of
care involves the interdisciplinary acute inpatient rehabilitation program. 
Measurable functional goals would be for the patient to become modified
independent with transfers, mobility, ADLs, so that he can hopefully return back
to his prior living situation.  Prognosis is reasonably good with estimated
length of stay probably at least 7-14 days pending progress.  Potential barriers
would include his multiple medical comorbidities and decreased functional
status.
 
The patient meets diagnostic criteria for an acute in-hospital inpatient
rehabilitation stay.  He meets the medical necessity criteria.  He does have the
tolerance for therapies and has appropriate discharge goals back to the home
setting.
 
 
 
 
[address]   [address]                     HISTORY AND PHYSICAL          
_______________________________________________________________________________
 
Name:       ENEDELIA JUAREZ JR           Room #:         510-P       [facility] [place]  
[facility].#:      [id number]                       Account #:      [id number]  
Admission:  08/20/20    Attend Phys:    Vini Barnes MD 
Discharge:  08/26/20    Date of Birth:  [birth year]  
                                                          Report #: [id number]
                                                                    [id number]   
_______________________________________________________________________________
 
ADDENDUM:  He is to continue with his hip precautions.
 
 
 
 
 
 
 
 
 
 
 
 
 
 
 
 
 
 
 
 
 
 
 
 
 
 
 
 
 
 
 
 
 
 
 
 
 
 
 
 
 
 
  <ELECTRONICALLY SIGNED>
   By: Vini Barnes MD         
  08/28/20     0946
D: 08/21/20 1410                           _____________________________________
T: 08/21/20 1449                           Vini Barnes MD           /nt
ongoing assessment re appropriate Assistive Device d/c recommendations

## 2022-07-07 NOTE — PROGRESS NOTE ADULT - ASSESSMENT
ASSESSMENT/PLAN: 46y old  Female who presents with a chief complaint of CSF leak and meningitis    NEURO:   q1hr neurochecks post-op  OR today for VPS  pain mgt: tylenol and oxy 5 prn  home topamax  Activity: [x] mobilize as tolerated [] Bedrest [x] PT [x] OT [] PMNR    PULM: room air   SpO2>92%  incentive spirometry   OOB    CV: atorvastatin  SBP goal <140    RENAL: monitor I/O  replete lytes prn  voiding  Fluids: IVF while NPO    GI:  Diet: advance diet as tolerated (vegan)  GI prophylaxis [x] not indicated   zofran prn for nausea  Bowel regimen [x] senna [] other:    ENDO:   Goal euglycemia (-180)    HEME/ONC:  VTE prophylaxis: [] SCDs [x] chemoprophylaxis held post-op    ID: afebrile   no leukocytosis    MISC:    I affirm that this patient is critically ill and at high risk for sudden, fatal deterioration due to one or more of the above stated active issues.     This time does not include bedside procedures that are documented separately.   ASSESSMENT/PLAN: 46y old  Female who presents with a chief complaint of CSF leak and meningitis    NEURO:   q1hr neurochecks post-op  OR today for VPS  pain mgt: tylenol and oxy 5 prn  CTH post-op  home topamax  Activity: [x] mobilize as tolerated [] Bedrest [x] PT [x] OT [] PMNR    PULM: room air   SpO2>92%  incentive spirometry   OOB    CV: atorvastatin  SBP goal <140    RENAL: monitor I/O  replete lytes prn  voiding  Fluids: IVF while NPO    GI:  Diet: advance diet as tolerated (vegan)  GI prophylaxis [x] not indicated   zofran prn for nausea  Bowel regimen [x] senna [] other:    ENDO:   Goal euglycemia (-180)    HEME/ONC:  VTE prophylaxis: [] SCDs [x] chemoprophylaxis held post-op    ID: afebrile   no leukocytosis    MISC:    I affirm that this patient is critically ill and at high risk for sudden, fatal deterioration due to one or more of the above stated active issues.     This time does not include bedside procedures that are documented separately.

## 2022-07-07 NOTE — BRIEF OPERATIVE NOTE - NSICDXBRIEFPROCEDURE_GEN_ALL_CORE_FT
PROCEDURES:   shunt 07-Jul-2022 11:22:43  Austen Peres  
PROCEDURES:   shunt 07-Jul-2022 11:22:43  Austen Peres

## 2022-07-07 NOTE — DIETITIAN INITIAL EVALUATION ADULT - OTHER INFO
46 year old female PMH HLD, chronic back pain, migraines, transferred from [facility] s/p endoscopic endonasal repair of CSF leak w/ skull base reconstruction and placement of LD. LD removed.     Attempted to interview, pt in OR for VPS. Pt previously on a regular (vegan) diet. Per RD assessment from 6/27/22, pt was eating good at that time. Weight then was 233 lbs. Current weight documented 241.1 lbs. RD to follow up with subjective interview as feasible.

## 2022-07-07 NOTE — PHYSICAL THERAPY INITIAL EVALUATION ADULT - PERTINENT HX OF CURRENT PROBLEM, REHAB EVAL
pt was sent into ED by neurologist for neuro eval 2/2 CSF leak post covid swab. pt required covid swab to travel to [place]. 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 pt was then transferred to [facility] for endoscopic endonasal repair of CSF leak c/ skull base reconstruction and placement of LD on 6/29 and pt isPOD #0 for  shunt placement.

## 2022-07-07 NOTE — OCCUPATIONAL THERAPY INITIAL EVALUATION ADULT - PERTINENT HX OF CURRENT PROBLEM, REHAB EVAL
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

## 2022-07-07 NOTE — PHYSICAL THERAPY INITIAL EVALUATION ADULT - ADDITIONAL COMMENTS
Pt lives with spouse and son (two dtrs present at bedside are over all of the time) in a private home with 5 RENEA 1 handrail onto porch + 1 RENEA no handrails + 6-8 steps to upper main level/bed&bath. Pt's PLOF was independent in all ADLs/ambulation without an Assistive Device and (+) working/driving PTA. Pt has no DME at home.

## 2022-07-07 NOTE — OCCUPATIONAL THERAPY INITIAL EVALUATION ADULT - LIVES WITH, PROFILE
and son, in a private house with 1/5 steps to enter with railing and 6 steps inside with railing; pt's family available to assist upon discharge/children/spouse

## 2022-07-07 NOTE — DIETITIAN INITIAL EVALUATION ADULT - NS FNS DIET ORDER
Diet, NPO after Midnight:      NPO Start Date: 06-Jul-2022,   NPO Start Time: 23:59  Except Medications (07-06-22 @ 13:41)

## 2022-07-07 NOTE — PHYSICAL THERAPY INITIAL EVALUATION ADULT - DISCHARGE DISPOSITION, PT EVAL
home with supervision prn for safety and/or least restrictive Assistive Device pending progress and pending stair assessment home with supervision prn for safety, least restrictive Assistive Device and outpatient PT if deemed appropriate/when cleared by MD, pending progress and pending stair assessment

## 2022-07-07 NOTE — PROGRESS NOTE ADULT - ASSESSMENT
46F presented s/p COVID swab c/o persistent rhinorrhea, found to have CSF leak. Trf to [facility] for endoscopic endonasal repair of CSF leak with lumbar drain on 6/29. Lumbar drain removed 7/5 with high pressure CSF outflow from removal site. Trf back to [facility] 7/5 for further care. LP done 7/6 illustrating high opening pressure.   s/p Right  shunt, strata II @ 1.5 POD#0         Plan  - Q1 neuro checks  - HOB 30 degrees  - Pain control PRN: avoid oversedation. Tylenol 650q6, Oxy 5/10q4  - SBP <130; Hydralazine/Labetalol 10q2  - Keppra 500 BID  - Ancef 2g q8  - Lipitor 40  - Topamax 25  - Flexeril 10 TID PRN  - Advance diet as tolerated   - Zofran PRN  - Miralax/Senna- monitor BM's  - Continue Ocean spray PRN   - Incentive spirometry encouraged   - OOB w/ assistance   - b/l SCDs; hold AC/AP/chemical DVT prophylaxis at this time  - Wound: May remove head dressing on 7/10/22 (POD#3). Leave open to air. May wash head on 7/15/22 AM (POD#5)- using mild soap, avoid tugging/pulling on incision. Pat dry.  - TRANSPHENOIDAL PRECAUTIONS: No straws, NGT, OGT, NC, High Flow oxygen, blowing, Nothing in nares unless cleared by neurosurgery team   - CTH in 6 hours or sooner if dec in mental status  - Xray Shunt series ordered for AM  - AM labs   - PT/OT  - General surgery team following  - Plan discussed with ICU team  - Medical management/supportive care per NSICU  - D/w Dr. Hanson 46F presented s/p COVID swab c/o persistent rhinorrhea, found to have CSF leak. Trf to [facility] for endoscopic endonasal repair of CSF leak with lumbar drain on 6/29. Lumbar drain removed 7/5 with high pressure CSF outflow from removal site. Trf back to [facility] 7/5 for further care. LP done 7/6 illustrating high opening pressure.   s/p Right  shunt, strata II @ 1.5 POD#0      Plan  - Q1 neuro checks  - HOB 30 degrees  - Pain control PRN: avoid oversedation. Tylenol 650q6, Oxy 5/10q4  - SBP <130; Hydralazine/Labetalol 10q2  - Keppra 500 BID  - Ancef 2g q8  - Lipitor 40  - Topamax 25  - Flexeril 10 TID PRN  - Advance diet as tolerated   - Zofran PRN  - Miralax/Senna- monitor BM's  - Continue Ocean spray PRN   - Incentive spirometry encouraged   - OOB w/ assistance   - b/l SCDs; hold AC/AP/chemical DVT prophylaxis at this time  - Wound: May remove head dressing on 7/10/22 (POD#3). Leave open to air. May wash head on 7/15/22 AM (POD#5)- using mild soap, avoid tugging/pulling on incision. Pat dry.  - TRANSPHENOIDAL PRECAUTIONS: No straws, NGT, OGT, NC, High Flow oxygen, blowing, Nothing in nares unless cleared by neurosurgery team   - CTH in 6 hours or sooner if dec in mental status  - Xray Shunt series ordered for AM  - AM labs   - PT/OT  - General surgery team following  - Plan discussed with ICU team  - Medical management/supportive care per NSICU  - D/w Dr. Hanson

## 2022-07-07 NOTE — PROGRESS NOTE ADULT - ASSESSMENT
Assessment:  46F who presented after COVID swab with persistent rhinorrhea, found to have CSF leak. Tx to [facility] for endoscopic endonasal repair of CSF leak with lumbar drain on 6/29. Lumbar drain removed 7/5 with high pressure CSF outflow from removal site. Tx back to [facility] 7/5 for further care.  - POD 9  - LP showed high opening pressure      Plan:   - Q4 hour neuro checks  - SBP goal 100-150  - Transphenoidal precautions in place   - Plan for shunt today 2/2 high opening pressure seen on LP  - CTH brainlab protocol pending prior to OR   - DVT prophylaxis: SCDs only, no lovenox 2/2 shunt   - Pain control PRN, avoid oversedation  - PT/OT/OOB to chair, maintain HOB @ 30 degrees minimum  - Incentive spirometry  - Further care per NSICU team  - Final plan pending morning rounds with attending

## 2022-07-07 NOTE — CHART NOTE - NSCHARTNOTEFT_GEN_A_CORE
Notified of burning L eye by RN. We examined the patient at beside- no erythema noted, but mild scleral edema laterally with tearing. Will trial artificial tears and saline rinse.

## 2022-07-07 NOTE — CHART NOTE - NSCHARTNOTEFT_GEN_A_CORE
POC Post Op Check     HPI/OVERNIGHT EVENTS:  POD 0 s/p  shunt, doing well. Patient complains of clear fluid/eye irritation. Otherwise no complaints. Pain well controlled. HDS.     MEDICATIONS  (STANDING):  artificial  tears Solution 1 Drop(s) Both EYES three times a day  atorvastatin 40 milliGRAM(s) Oral at bedtime  ceFAZolin   IVPB 2000 milliGRAM(s) IV Intermittent every 8 hours  chlorhexidine 2% Cloths 1 Application(s) Topical daily  melatonin 5 milliGRAM(s) Oral at bedtime  polyethylene glycol 3350 17 Gram(s) Oral daily  senna 1 Tablet(s) Oral two times a day  sodium chloride 0.65% Nasal 1 Spray(s) Both Nostrils four times a day  topiramate 25 milliGRAM(s) Oral daily    MEDICATIONS  (PRN):  acetaminophen     Tablet .. 650 milliGRAM(s) Oral every 4 hours PRN Temp greater or equal to 38C (100.4F), Mild Pain (1 - 3)  cyclobenzaprine 10 milliGRAM(s) Oral three times a day PRN Muscle Spasm  hydrALAZINE Injectable 5 milliGRAM(s) IV Push every 4 hours PRN SBP>130  labetalol Injectable 5 milliGRAM(s) IV Push every 4 hours PRN SBP>130  ondansetron Injectable 4 milliGRAM(s) IV Push every 6 hours PRN Nausea and/or Vomiting  oxyCODONE    IR 10 milliGRAM(s) Oral every 6 hours PRN Severe Pain (7 - 10)  oxyCODONE    IR 5 milliGRAM(s) Oral every 4 hours PRN Moderate Pain (4 - 6)      Vital Signs Last 24 Hrs  T(C): 36.7 (07 Jul 2022 15:15), Max: 36.7 (06 Jul 2022 20:01)  T(F): 98.1 (07 Jul 2022 15:15), Max: 98.1 (07 Jul 2022 15:15)  HR: 88 (07 Jul 2022 16:00) (71 - 101)  BP: 138/90 (07 Jul 2022 16:00) (115/65 - 153/105)  BP(mean): 105 (07 Jul 2022 16:00) (81 - 117)  RR: 14 (07 Jul 2022 16:00) (11 - 22)  SpO2: 96% (07 Jul 2022 16:00) (94% - 100%)    Parameters below as of 07 Jul 2022 16:00  Patient On (Oxygen Delivery Method): room air        Constitutional: patient resting comfortably in bed, in no acute distress  HEENT: Dressing in place w/ minimal s/t  Respiratory: respirations are unlabored, no accessory muscle use, no conversational dyspnea  Cardiovascular: regular rate & rhythm  Gastrointestinal: Abdomen soft, non-tender, non-distended, no rebound tenderness / guarding, incisions w/dermabond in place c/d/i   Neurological: A&O x 3; no gross sensory / motor / coordination deficits        I&O's Detail    06 Jul 2022 07:01  -  07 Jul 2022 07:00  --------------------------------------------------------  IN:    sodium chloride 0.9%: 560 mL  Total IN: 560 mL    OUT:  Total OUT: 0 mL    Total NET: 560 mL      07 Jul 2022 07:01  -  07 Jul 2022 17:54  --------------------------------------------------------  IN:    sodium chloride 0.9%: 420 mL  Total IN: 420 mL    OUT:  Total OUT: 0 mL    Total NET: 420 mL          LABS:                        12.2   10.62 )-----------( 301      ( 07 Jul 2022 04:07 )             37.6     07-07    135  |  99  |  9.5  ----------------------------<  102<H>  4.0   |  18.0<L>  |  0.74    Ca    9.5      07 Jul 2022 04:07  Phos  4.3     07-07  Mg     1.9     07-07    Plan:   - F/U  shut study in AM   - Okay for diet from gen surgery perspective, per NSGY   - Pain Control MMD   - Remainder of care per primary team

## 2022-07-07 NOTE — BRIEF OPERATIVE NOTE - NSICDXBRIEFPREOP_GEN_ALL_CORE_FT
PRE-OP DIAGNOSIS:  Elevated intracranial pressure 07-Jul-2022 11:23:54  Austen Peres  
PRE-OP DIAGNOSIS:  Elevated intracranial pressure 07-Jul-2022 11:23:54  Austen Peres

## 2022-07-07 NOTE — BRIEF OPERATIVE NOTE - NSICDXBRIEFPOSTOP_GEN_ALL_CORE_FT
POST-OP DIAGNOSIS:  Elevated intracranial pressure 07-Jul-2022 11:24:04  Austen Peres  
POST-OP DIAGNOSIS:  Elevated intracranial pressure 07-Jul-2022 11:24:04  Austen Peres

## 2022-07-07 NOTE — PHYSICAL THERAPY INITIAL EVALUATION ADULT - GAIT DISTANCE, PT EVAL
5 steps from bed to chair, limited due to c/o eye pain with eyes open due to watery/itchy eyes post anesthesia/surgery

## 2022-07-07 NOTE — PROGRESS NOTE ADULT - SUBJECTIVE AND OBJECTIVE BOX
POST-OPERATIVE NOTE  Procedure: Right ventriculoperitoneal shunt placement, Srata II @ 1.5  Diagnosis/Indication: Increased intracranial pressure on LP  Surgeon: Dr. Jose Antonio Hanson  Co-Surgeon: Dr. Haydee Valdivia    INTERVAL HPI/ACUTE EVENTS:  46F presented s/p COVID swab c/o persistent rhinorrhea, found to have CSF leak. Trf to [facility] for endoscopic endonasal repair of CSF leak with lumbar drain on 6/29. Lumbar drain removed 7/5 with high pressure CSF outflow from removal site. Trf back to [facility] 7/5 for further care. LP done 7/6 illustrating high opening pressure.   s/p Right  shunt, strata II @ 1.5 POD#0   Patient tolerated procedure well. Right  shunt placed w/ pulsatile CSF on first pass of catheter and tunneled to peritoneum. Distal catheter secured by general surgeon and resident. EBL 25 cc, fluid intake 800, urine output 350 cc. Patient extubated in OR and transferred to NSICU.     VITALS:  T(C): 36.6 (07-07-22 @ 04:08), Max: 36.8 (07-06-22 @ 16:01)  HR: 94 (07-07-22 @ 07:30) (71 - 97)  BP: 137/79 (07-07-22 @ 07:00) (115/65 - 153/105)  RR: 18 (07-07-22 @ 07:30) (13 - 22)  SpO2: 96% (07-07-22 @ 07:30) (95% - 98%)  Wt(kg): --    PHYSICAL EXAM:  GENERAL: NAD, well-groomed, well-developed  HEAD:  S/p R crani. Dressing clean, dry, intact. Dried blood noted, not fully saturated.  DRAINS: Subgaleal/epidural/subdural drain to bulb suction/thumbprint suction/gravity. Serosanguinous drainage noted.  NECK: C-collar in place. Dressing clean, dry, intact  WOUND: Dressing clean dry intact  DHIRAJ COMA SCORE: E- V- M- =       E: 4= opens eyes spontaneously 3= to voice 2= to noxious 1= no opening       V: 5= oriented 4= confused 3= inappropriate words 2= incomprehensible sounds 1= nonverbal 1T= intubated       M: 6= follows commands 5= localizes 4= withdraws 3= flexor posturing 2= extensor posturing 1= no movement  MENTAL STATUS: AAO x3; Awake/Comatose; Opens eyes spontaneously/to voice/to light touch/to noxious stimuli; Appropriately conversant without aphasia/Nonverbal; following simple commands/mimicking/not following commands  CRANIAL NERVES: Visual acuity normal for age, visual fields full to confrontation, PERRL. EOMI without nystagmus. Facial sensation intact V1-3 distribution b/l. Face symmetric w/ normal eye closure and smile, tongue midline. Hearing grossly intact. Speech clear. Head turning and shoulder shrug intact.   REFLEXES: PERRL. Corneals intact b/l. Gag intact. Cough intact. Oculocephalic reflex intact (Doll's eye). Negative Bowles's b/l. Negative clonus b/l  MOTOR: strength 5/5 b/l upper and lower extremities  Uppers     Delt (C5/6)     Bicep (C5/6)     Wrist Extend (C6)     Tricep (C7)     HG (C8/T1)  R                     5/5                 5/5                         5/5                           5/5                   5/5  L                      5/5                 5/5                         5/5                           5/5                   5/5  Lowers      HF(L1/L2)     KE (L3)     DF (L4)     EHL (L5)     PF (S1)      R                     5/5              5/5           5/5           5/5            5/5  L                     5/5               5/5          5/5            5/5            5/5  SENSATION: grossly intact to light touch all extremities  COORDINATION: Gait intact; rapid alternating movements intact; heel to shin intact; no upper extremity dysmetria  CHEST/LUNG: Clear to auscultation bilaterally; no rales, rhonchi, wheezing, or rubs  HEART: +S1/+S2; Regular rate and rhythm; no murmurs, rubs, or gallops  ABDOMEN: Soft, nontender, nondistended; bowel sounds present all four quadrants  EXTREMITIES:  2+ peripheral pulses, no clubbing, cyanosis, or edema  SKIN: Warm, dry; no rashes or lesions    LABS:             12.2   10.62 )-----------( 301      ( 07 Jul 2022 04:07 )             37.6     07-07    135  |  99  |  9.5  ----------------------------<  102<H>  4.0   |  18.0<L>  |  0.74    Ca    9.5      07 Jul 2022 04:07  Phos  4.3     07-07  Mg     1.9     07-07        RADIOLOGY/OTHER:  CT Head No Cont (07.07.22 @ 05:59)   Patient returned for repeat head CT on July 7, 2022 at 5:53 AM.  The size and configuration of ventricles appear unchanged.  Mixed attenuated acute subdural hematoma along the posterior   interhemispheric region on the left side is again seen and unchanged.   Also again seen and unchanged is hemorrhagic contusion involving the left   frontal region.  Sinus mucosal thickening air-fluid levels are again seen as described   above.  IMPRESSION:   Stable exam.     POST-OPERATIVE NOTE  Procedure: Right ventriculoperitoneal shunt placement, Strata II @ 1.5  Diagnosis/Indication: Increased intracranial pressure on LP  Surgeon: Dr. Jose Antonio Hanson  Co-Surgeon: Dr. Haydee Valdivia    INTERVAL HPI/ACUTE EVENTS:  46F presented s/p COVID swab c/o persistent rhinorrhea, found to have CSF leak. Trf to [facility] for endoscopic endonasal repair of CSF leak with lumbar drain on 6/29. Lumbar drain removed 7/5 with high pressure CSF outflow from removal site. Trf back to [facility] 7/5 for further care. LP done 7/6 illustrating high opening pressure.   s/p Right  shunt, strata II @ 1.5 POD#0   Patient tolerated procedure well. Right  shunt placed w/ pulsatile CSF on first pass of catheter and tunneled to peritoneum. Distal catheter secured by general surgeon and resident. EBL 25 cc, fluid intake 800, urine output 350 cc. Patient extubated in OR and transferred to NSICU. Patient seen later in afternoon, tolerating diet, complaining of mild abdominal pain>>> head pain.     VITALS:  T(C): 36.6 (07-07-22 @ 04:08), Max: 36.8 (07-06-22 @ 16:01)  HR: 94 (07-07-22 @ 07:30) (71 - 97)  BP: 137/79 (07-07-22 @ 07:00) (115/65 - 153/105)  RR: 18 (07-07-22 @ 07:30) (13 - 22)  SpO2: 96% (07-07-22 @ 07:30) (95% - 98%)  Wt(kg): --    PHYSICAL EXAM:  GENERAL: NAD, well-groomed, well-developed  HEAD:  S/p R crani. Dressing clean, dry, intact. Dried blood noted, not fully saturated.  WOUND: Dressing clean, dry, intact  DHIRAJ COMA SCORE: E-4 V-5 M-6 = 15       E: 4= opens eyes spontaneously 3= to voice 2= to noxious 1= no opening       V: 5= oriented 4= confused 3= inappropriate words 2= incomprehensible sounds 1= nonverbal 1T= intubated       M: 6= follows commands 5= localizes 4= withdraws 3= flexor posturing 2= extensor posturing 1= no movement  MENTAL STATUS: AAO x3; Awake; Opens eyes spontaneously; Appropriately conversant without aphasia; following simple commands  CRANIAL NERVES: Visual acuity normal for age, PERRL. EOMI without nystagmus. Facial sensation intact V1-3 distribution b/l. Face symmetric w/ normal eye closure and smile, tongue midline. Hearing grossly intact. Speech clear.   MOTOR: strength 5/5 b/l upper and lower extremities; no drift  SENSATION: grossly intact to light touch all extremities  CHEST/LUNG: Nonlabored breaths  SKIN: Warm, dry    LABS:             12.2   10.62 )-----------( 301      ( 07 Jul 2022 04:07 )             37.6     07-07    135  |  99  |  9.5  ----------------------------<  102<H>  4.0   |  18.0<L>  |  0.74    Ca    9.5      07 Jul 2022 04:07  Phos  4.3     07-07  Mg     1.9     07-07        RADIOLOGY/OTHER:  CT Head No Cont (07.07.22 @ 05:59)   Patient returned for repeat head CT on July 7, 2022 at 5:53 AM.  The size and configuration of ventricles appear unchanged.  Mixed attenuated acute subdural hematoma along the posterior   interhemispheric region on the left side is again seen and unchanged.   Also again seen and unchanged is hemorrhagic contusion involving the left   frontal region.  Sinus mucosal thickening air-fluid levels are again seen as described   above.  IMPRESSION:   Stable exam.

## 2022-07-07 NOTE — DIETITIAN INITIAL EVALUATION ADULT - PERTINENT LABORATORY DATA
07-07    135  |  99  |  9.5  ----------------------------<  102<H>  4.0   |  18.0<L>  |  0.74    Ca    9.5      07 Jul 2022 04:07  Phos  4.3     07-07  Mg     1.9     07-07    A1C with Estimated Average Glucose Result: 5.5 % (06-28-22 @ 23:05)

## 2022-07-07 NOTE — DIETITIAN INITIAL EVALUATION ADULT - PERTINENT MEDS FT
MEDICATIONS  (STANDING):  atorvastatin 40 milliGRAM(s) Oral at bedtime  melatonin 5 milliGRAM(s) Oral at bedtime  polyethylene glycol 3350 17 Gram(s) Oral daily  senna 1 Tablet(s) Oral two times a day  sodium chloride 0.65% Nasal 1 Spray(s) Both Nostrils four times a day  sodium chloride 0.9%. 1000 milliLiter(s) (70 mL/Hr) IV Continuous <Continuous>  topiramate 25 milliGRAM(s) Oral daily    MEDICATIONS  (PRN):  acetaminophen     Tablet .. 650 milliGRAM(s) Oral every 4 hours PRN Temp greater or equal to 38C (100.4F), Mild Pain (1 - 3)  cyclobenzaprine 10 milliGRAM(s) Oral three times a day PRN Muscle Spasm  oxyCODONE    IR 5 milliGRAM(s) Oral every 4 hours PRN Moderate Pain (4 - 6)  oxyCODONE    IR 10 milliGRAM(s) Oral every 6 hours PRN Severe Pain (7 - 10)

## 2022-07-07 NOTE — PHYSICAL THERAPY INITIAL EVALUATION ADULT - GENERAL OBSERVATIONS, REHAB EVAL
Pt received on 3NOR, pt ok for PT by covering RN Americo. co-treated with Elizabeth GODOY. pt's family present at bedside t/o eval, pt consented for family to be present. pt observed semi-yeboah in bed with IV, telemonitor with /BP cuff, samano, pleasant, cooperative, A&O and c/o persistent itchy/watery eyes t/o eval.

## 2022-07-08 LAB
ANION GAP SERPL CALC-SCNC: 14 MMOL/L — SIGNIFICANT CHANGE UP (ref 5–17)
BUN SERPL-MCNC: 11.2 MG/DL — SIGNIFICANT CHANGE UP (ref 8–20)
CALCIUM SERPL-MCNC: 9 MG/DL — SIGNIFICANT CHANGE UP (ref 8.6–10.2)
CHLORIDE SERPL-SCNC: 102 MMOL/L — SIGNIFICANT CHANGE UP (ref 98–107)
CO2 SERPL-SCNC: 17 MMOL/L — LOW (ref 22–29)
CREAT SERPL-MCNC: 0.63 MG/DL — SIGNIFICANT CHANGE UP (ref 0.5–1.3)
EGFR: 109 ML/MIN/1.73M2 — SIGNIFICANT CHANGE UP
GLUCOSE SERPL-MCNC: 113 MG/DL — HIGH (ref 70–99)
HCT VFR BLD CALC: 37.1 % — SIGNIFICANT CHANGE UP (ref 34.5–45)
HGB BLD-MCNC: 12.3 G/DL — SIGNIFICANT CHANGE UP (ref 11.5–15.5)
MAGNESIUM SERPL-MCNC: 1.8 MG/DL — SIGNIFICANT CHANGE UP (ref 1.6–2.6)
MCHC RBC-ENTMCNC: 27.3 PG — SIGNIFICANT CHANGE UP (ref 27–34)
MCHC RBC-ENTMCNC: 33.2 GM/DL — SIGNIFICANT CHANGE UP (ref 32–36)
MCV RBC AUTO: 82.4 FL — SIGNIFICANT CHANGE UP (ref 80–100)
PHOSPHATE SERPL-MCNC: 2.9 MG/DL — SIGNIFICANT CHANGE UP (ref 2.4–4.7)
PLATELET # BLD AUTO: 513 K/UL — HIGH (ref 150–400)
POTASSIUM SERPL-MCNC: 3.7 MMOL/L — SIGNIFICANT CHANGE UP (ref 3.5–5.3)
POTASSIUM SERPL-SCNC: 3.7 MMOL/L — SIGNIFICANT CHANGE UP (ref 3.5–5.3)
RBC # BLD: 4.5 M/UL — SIGNIFICANT CHANGE UP (ref 3.8–5.2)
RBC # FLD: 13.2 % — SIGNIFICANT CHANGE UP (ref 10.3–14.5)
SODIUM SERPL-SCNC: 132 MMOL/L — LOW (ref 135–145)
WBC # BLD: 18.12 K/UL — HIGH (ref 3.8–10.5)
WBC # FLD AUTO: 18.12 K/UL — HIGH (ref 3.8–10.5)

## 2022-07-08 PROCEDURE — 99232 SBSQ HOSP IP/OBS MODERATE 35: CPT

## 2022-07-08 RX ORDER — ENOXAPARIN SODIUM 100 MG/ML
40 INJECTION SUBCUTANEOUS
Refills: 0 | Status: DISCONTINUED | OUTPATIENT
Start: 2022-07-08 | End: 2022-07-10

## 2022-07-08 RX ORDER — SODIUM CHLORIDE 9 MG/ML
500 INJECTION INTRAMUSCULAR; INTRAVENOUS; SUBCUTANEOUS ONCE
Refills: 0 | Status: COMPLETED | OUTPATIENT
Start: 2022-07-08 | End: 2022-07-08

## 2022-07-08 RX ORDER — POTASSIUM PHOSPHATE, MONOBASIC POTASSIUM PHOSPHATE, DIBASIC 236; 224 MG/ML; MG/ML
15 INJECTION, SOLUTION INTRAVENOUS ONCE
Refills: 0 | Status: COMPLETED | OUTPATIENT
Start: 2022-07-08 | End: 2022-07-08

## 2022-07-08 RX ORDER — SODIUM CHLORIDE 9 MG/ML
1 INJECTION INTRAMUSCULAR; INTRAVENOUS; SUBCUTANEOUS EVERY 8 HOURS
Refills: 0 | Status: DISCONTINUED | OUTPATIENT
Start: 2022-07-08 | End: 2022-07-10

## 2022-07-08 RX ORDER — MAGNESIUM SULFATE 500 MG/ML
1 VIAL (ML) INJECTION ONCE
Refills: 0 | Status: COMPLETED | OUTPATIENT
Start: 2022-07-08 | End: 2022-07-08

## 2022-07-08 RX ORDER — LABETALOL HCL 100 MG
5 TABLET ORAL EVERY 4 HOURS
Refills: 0 | Status: DISCONTINUED | OUTPATIENT
Start: 2022-07-08 | End: 2022-07-10

## 2022-07-08 RX ORDER — HYDRALAZINE HCL 50 MG
5 TABLET ORAL EVERY 4 HOURS
Refills: 0 | Status: DISCONTINUED | OUTPATIENT
Start: 2022-07-08 | End: 2022-07-10

## 2022-07-08 RX ADMIN — Medication 1 DROP(S): at 13:29

## 2022-07-08 RX ADMIN — Medication 100 MILLIGRAM(S): at 21:15

## 2022-07-08 RX ADMIN — Medication 100 MILLIGRAM(S): at 06:45

## 2022-07-08 RX ADMIN — Medication 1 DROP(S): at 06:44

## 2022-07-08 RX ADMIN — ATORVASTATIN CALCIUM 40 MILLIGRAM(S): 80 TABLET, FILM COATED ORAL at 21:15

## 2022-07-08 RX ADMIN — Medication 25 MILLIGRAM(S): at 12:12

## 2022-07-08 RX ADMIN — SODIUM CHLORIDE 1 GRAM(S): 9 INJECTION INTRAMUSCULAR; INTRAVENOUS; SUBCUTANEOUS at 06:44

## 2022-07-08 RX ADMIN — OXYCODONE HYDROCHLORIDE 5 MILLIGRAM(S): 5 TABLET ORAL at 13:00

## 2022-07-08 RX ADMIN — SODIUM CHLORIDE 1 GRAM(S): 9 INJECTION INTRAMUSCULAR; INTRAVENOUS; SUBCUTANEOUS at 21:15

## 2022-07-08 RX ADMIN — SODIUM CHLORIDE 500 MILLILITER(S): 9 INJECTION INTRAMUSCULAR; INTRAVENOUS; SUBCUTANEOUS at 08:59

## 2022-07-08 RX ADMIN — POLYETHYLENE GLYCOL 3350 17 GRAM(S): 17 POWDER, FOR SOLUTION ORAL at 12:12

## 2022-07-08 RX ADMIN — SENNA PLUS 1 TABLET(S): 8.6 TABLET ORAL at 06:44

## 2022-07-08 RX ADMIN — Medication 100 GRAM(S): at 06:44

## 2022-07-08 RX ADMIN — ENOXAPARIN SODIUM 40 MILLIGRAM(S): 100 INJECTION SUBCUTANEOUS at 18:28

## 2022-07-08 RX ADMIN — Medication 1 SPRAY(S): at 00:42

## 2022-07-08 RX ADMIN — CHLORHEXIDINE GLUCONATE 1 APPLICATION(S): 213 SOLUTION TOPICAL at 12:12

## 2022-07-08 RX ADMIN — Medication 1 SPRAY(S): at 06:44

## 2022-07-08 RX ADMIN — SENNA PLUS 1 TABLET(S): 8.6 TABLET ORAL at 18:28

## 2022-07-08 RX ADMIN — POTASSIUM PHOSPHATE, MONOBASIC POTASSIUM PHOSPHATE, DIBASIC 62.5 MILLIMOLE(S): 236; 224 INJECTION, SOLUTION INTRAVENOUS at 06:45

## 2022-07-08 RX ADMIN — Medication 1 SPRAY(S): at 12:12

## 2022-07-08 RX ADMIN — Medication 100 MILLIGRAM(S): at 13:32

## 2022-07-08 RX ADMIN — OXYCODONE HYDROCHLORIDE 5 MILLIGRAM(S): 5 TABLET ORAL at 12:15

## 2022-07-08 RX ADMIN — SODIUM CHLORIDE 1 GRAM(S): 9 INJECTION INTRAMUSCULAR; INTRAVENOUS; SUBCUTANEOUS at 13:28

## 2022-07-08 NOTE — PROGRESS NOTE ADULT - SUBJECTIVE AND OBJECTIVE BOX
HPI:  46yoF PMH HLD, chronic back pain, migraines, transferred from [facility] p/o from repair of CSF leak. Pt initially presented to [facility] on 6/21 w/ L nare nasal drainage since after a COVID swab 5/28. Pt needed swab performed to travel to [place]. While in [place], pt had persistent rhinorrhea since COVID swab, associated fever, HA, cough and generalized weakness. 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 Pt completed 1 week of augmentin. [place] 6[place] and f/u w/ neurology/Dr Adames who sent her for further evaluation. She continued to have fevers up to 102 and came to [facility] ED on 6/20. CTH showed Mildly low-lying cerebellar tonsils. Given Ceftriaxone & Vanco x1. 6/22 ID consulted and started on Cefepime and Vanco. MRI brain +/- & MRV showed  Unremarkable MRI of the brain with and without contrast. Fluid in the left ethmoid and maxillary sinus may be related to CSF leak versus sinusitis. Recommend CT cisternogram for further evaluation. Normal intracranial MR venogram. 6/23 CSF PCR + enterovirus. 6/25 CT maxillofacial Thinning of the cribriform plate with fluid in the left ethmoid and maxillary sinus likely related to CSF leak. 6/28 Transferred to [facility] for surgical management coordinated w/ ENT. 6/29 OR for endoscopic endonasal repair of CSF leak w/ skull base reconstruction and placement of LD. LD @10cc/hr. P/o c/o frontal HA improved w/ pain medication. No further nasal drainage or salty/metallic taste. 7/1 CTH very small interhemispheric SDH stable on subsequent CTH 7/2. 7/5 LD removed and transferred back to [facility] ICU. Currently c/o mild L temporal HA worse w/ lying down.  (05 Jul 2022 15:41)    Interval history:  Patient seen and examined while resting in bed. Pt s/p VPS yesterday. Postoperatively c/o left eye burning that improved with artificial tears. Endorsed олег-incisional abdominal pain, resolved with ofirmev.     Physical Exam:  Constitutional: NAD, lying in bed  Neuro  * Mental Status:  GCS 15: Awake, alert, oriented to conversation. No aphasia or difficulty speaking. No dysarthria.   * Cranial Nerves: Cnii-Cnxii grossly intact. PERRL, EOMI, no gaze deviation  * Motor: RUE 5/5, LUE 5/5, RLE 5/5, LLE 5/5, no drift  * Sensory: Sensation intact to light touch  * Reflexes: not assessed     Vital Signs Last 24 Hrs  T(C): 36.7 (08 Jul 2022 00:18), Max: 36.7 (07 Jul 2022 11:35)  T(F): 98.1 (08 Jul 2022 00:18), Max: 98.1 (07 Jul 2022 15:15)  HR: 96 (08 Jul 2022 00:00) (72 - 124)  BP: 129/76 (08 Jul 2022 00:00) (107/85 - 153/105)  BP(mean): 92 (08 Jul 2022 00:00) (83 - 117)  RR: 16 (08 Jul 2022 00:00) (11 - 23)  SpO2: 94% (08 Jul 2022 00:00) (94% - 100%)    I&O's Summary  06 Jul 2022 07:01  -  07 Jul 2022 07:00  --------------------------------------------------------  IN: 560 mL / OUT: 0 mL / NET: 560 mL    07 Jul 2022 07:01  -  08 Jul 2022 03:27  --------------------------------------------------------  IN: 920 mL / OUT: 1725 mL / NET: -805 mL    LABS:             12.2   10.62 )-----------( 301      ( 07 Jul 2022 04:07 )             37.6     135  |  99  |  9.5  ----------------------------<  102<H>  4.0   |  18.0<L>  |  0.74    Ca    9.5      07 Jul 2022 04:07  Phos  4.3     07-07  Mg     1.9     07-07

## 2022-07-08 NOTE — CHART NOTE - NSCHARTNOTEFT_GEN_A_CORE
HPI:  46yoF PMH HLD, chronic back pain, migraines, transferred from [facility] p/o from repair of CSF leak. Pt initially presented to [facility] on 6/21 w/ L nare nasal drainage since after a COVID swab 5/28. Pt needed swab performed to travel to [place]. While in [place], pt had persistent rhinorrhea since COVID swab, associated fever, HA, cough and generalized weakness. 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 Pt completed 1 week of augmentin. [place] 6[place] and f/u w/ neurology/Dr Adames who sent her for further evaluation. She continued to have fevers up to 102 and came to [facility] ED on 6/20. CTH showed Mildly low-lying cerebellar tonsils. Given Ceftriaxone & Vanco x1. 6/22 ID consulted and started on Cefepime and Vanco. MRI brain +/- & MRV showed  Unremarkable MRI of the brain with and without contrast. Fluid in the left ethmoid and maxillary sinus may be related to CSF leak versus sinusitis. Recommend CT cisternogram for further evaluation. Normal intracranial MR venogram. 6/23 CSF PCR + enterovirus. 6/25 CT maxillofacial Thinning of the cribriform plate with fluid in the left ethmoid and maxillary sinus likely related to CSF leak. 6/28 Transferred to [facility] for surgical management coordinated w/ ENT. 6/29 OR for endoscopic endonasal repair of CSF leak w/ skull base reconstruction and placement of LD. LD @10cc/hr. P/o c/o frontal HA improved w/ pain medication. No further nasal drainage or salty/metallic taste. 7/1 CTH very small interhemispheric SDH stable on subsequent CTH 7/2. 7/5 LD removed and transferred back to [facility] ICU. Currently c/o mild L temporal HA worse w/ lying down.     INTERVAL HPI:      Vital Signs Last 24 Hrs  T(C): 36.4 (08 Jul 2022 07:44), Max: 36.7 (07 Jul 2022 11:35)  T(F): 97.6 (08 Jul 2022 07:44), Max: 98.1 (07 Jul 2022 15:15)  HR: 94 (08 Jul 2022 08:00) (77 - 124)  BP: 141/73 (08 Jul 2022 08:00) (107/85 - 152/77)  BP(mean): 91 (08 Jul 2022 08:00) (83 - 108)  RR: 18 (08 Jul 2022 08:00) (11 - 23)  SpO2: 95% (08 Jul 2022 08:00) (93% - 100%)    Parameters below as of 08 Jul 2022 08:00  Patient On (Oxygen Delivery Method): room air        PHYSICAL EXAM:  GENERAL: NAD, well-groomed, well-developed  HEAD:  Atraumatic, normocephalic  DRAINS:   WOUND: Dressing clean dry intact  DHIRAJ COMA SCORE: E- V- M- =       E: 4= opens eyes spontaneously 3= to voice 2= to noxious 1= no opening       V: 5= oriented 4= confused 3= inappropriate words 2= incomprehensible sounds 1= nonverbal 1T= intubated       M: 6= follows commands 5= localizes 4= withdraws 3= flexor posturing 2= extensor posturing 1= no movement  MENTAL STATUS: AAO x3; Awake/Comatose; Opens eyes spontaneously/to voice/to light touch/to noxious stimuli; Appropriately conversant without aphasia/Nonverbal; following simple commands/mimicking/not following commands  CRANIAL NERVES: Visual acuity normal for age, visual fields full to confrontation, PERRL. EOMI without nystagmus. Facial sensation intact V1-3 distribution b/l. Face symmetric w/ normal eye closure and smile, tongue midline. Hearing grossly intact. Speech clear. Head turning and shoulder shrug intact.   REFLEXES: PERRL. Corneals intact b/l. Gag intact. Cough intact. Oculocephalic reflex intact (Doll's eye). Negative Bowles's b/l. Negative clonus b/l  MOTOR: strength 5/5 b/l upper and lower extremities  Uppers     Delt (C5/6)     Bicep (C5/6)     Wrist Extend (C6)     Tricep (C7)     HG (C8/T1)  R                     5/5                 5/5                         5/5                           5/5                   5/5  L                      5/5                 5/5                         5/5                           5/5                   5/5  Lowers      HF(L1/L2)     KE (L3)     DF (L4)     EHL (L5)     PF (S1)      R                     5/5              5/5           5/5           5/5            5/5  L                     5/5               5/5          5/5            5/5            5/5  SENSATION: grossly intact to light touch all extremities  COORDINATION: Gait intact; rapid alternating movements intact; heel to shin intact; no upper extremity dysmetria  CHEST/LUNG: Clear to auscultation bilaterally; no rales, rhonchi, wheezing, or rubs  HEART: +S1/+S2; Regular rate and rhythm; no murmurs, rubs, or gallops  ABDOMEN: Soft, nontender, nondistended; bowel sounds present all four quadrants  EXTREMITIES:  2+ peripheral pulses, no clubbing, cyanosis, or edema  SKIN: Warm, dry; no rashes or lesions    LABS:                        12.3   18.12 )-----------( 513      ( 08 Jul 2022 04:19 )             37.1     07-08    132<L>  |  102  |  11.2  ----------------------------<  113<H>  3.7   |  17.0<L>  |  0.63    Ca    9.0      08 Jul 2022 04:19  Phos  2.9     07-08  Mg     1.8     07-08 07-07 @ 07:01  -  07-08 @ 07:00  --------------------------------------------------------  IN: 1057.5 mL / OUT: 2265 mL / NET: -1207.5 mL        RADIOLOGY & ADDITIONAL TESTS:          CAPRINI SCORE [CLOT]:  Patient has an estimated Caprini score of greater than 5.  However, the patient's unique clinical situation will be addressed in an individual manner to determine appropriate anticoagulation treatment, if any. HPI:  46yoF PMH HLD, chronic back pain, migraines, transferred from [facility] p/o from repair of CSF leak. Pt initially presented to [facility] on 6/21 w/ L nare nasal drainage since after a COVID swab 5/28. Pt needed swab performed to travel to [place]. While in [place], pt had persistent rhinorrhea since COVID swab, associated fever, HA, cough and generalized weakness. 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 Pt completed 1 week of augmentin. [place] 6[place] and f/u w/ neurology/Dr Adames who sent her for further evaluation. She continued to have fevers up to 102 and came to [facility] ED on 6/20. CTH showed Mildly low-lying cerebellar tonsils. Given Ceftriaxone & Vanco x1. 6/22 ID consulted and started on Cefepime and Vanco. MRI brain +/- & MRV showed  Unremarkable MRI of the brain with and without contrast. Fluid in the left ethmoid and maxillary sinus may be related to CSF leak versus sinusitis. Recommend CT cisternogram for further evaluation. Normal intracranial MR venogram. 6/23 CSF PCR + enterovirus. 6/25 CT maxillofacial Thinning of the cribriform plate with fluid in the left ethmoid and maxillary sinus likely related to CSF leak. 6/28 Transferred to [facility] for surgical management coordinated w/ ENT. 6/29 OR for endoscopic endonasal repair of CSF leak w/ skull base reconstruction and placement of LD. LD @10cc/hr. P/o c/o frontal HA improved w/ pain medication. No further nasal drainage or salty/metallic taste. 7/1 CTH very small interhemispheric SDH stable on subsequent CTH 7/2. 7/5 LD removed and transferred back to [facility] ICU. Currently c/o mild L temporal HA worse w/ lying down.     INTERVAL HPI:        Vital Signs Last 24 Hrs  T(C): 36.4 (08 Jul 2022 07:44), Max: 36.7 (07 Jul 2022 11:35)  T(F): 97.6 (08 Jul 2022 07:44), Max: 98.1 (07 Jul 2022 15:15)  HR: 94 (08 Jul 2022 08:00) (77 - 124)  BP: 141/73 (08 Jul 2022 08:00) (107/85 - 152/77)  BP(mean): 91 (08 Jul 2022 08:00) (83 - 108)  RR: 18 (08 Jul 2022 08:00) (11 - 23)  SpO2: 95% (08 Jul 2022 08:00) (93% - 100%)    Parameters below as of 08 Jul 2022 08:00  Patient On (Oxygen Delivery Method): room air        PHYSICAL EXAM:  GENERAL: NAD, well-groomed, well-developed  HEAD:  Atraumatic, normocephalic  DRAINS:   WOUND: Dressing clean dry intact  DHIRAJ COMA SCORE: E- V- M- =       E: 4= opens eyes spontaneously 3= to voice 2= to noxious 1= no opening       V: 5= oriented 4= confused 3= inappropriate words 2= incomprehensible sounds 1= nonverbal 1T= intubated       M: 6= follows commands 5= localizes 4= withdraws 3= flexor posturing 2= extensor posturing 1= no movement  MENTAL STATUS: AAO x3; Awake/Comatose; Opens eyes spontaneously/to voice/to light touch/to noxious stimuli; Appropriately conversant without aphasia/Nonverbal; following simple commands/mimicking/not following commands  CRANIAL NERVES: Visual acuity normal for age, visual fields full to confrontation, PERRL. EOMI without nystagmus. Facial sensation intact V1-3 distribution b/l. Face symmetric w/ normal eye closure and smile, tongue midline. Hearing grossly intact. Speech clear. Head turning and shoulder shrug intact.   REFLEXES: PERRL. Corneals intact b/l. Gag intact. Cough intact. Oculocephalic reflex intact (Doll's eye). Negative Bowles's b/l. Negative clonus b/l  MOTOR: strength 5/5 b/l upper and lower extremities  Uppers     Delt (C5/6)     Bicep (C5/6)     Wrist Extend (C6)     Tricep (C7)     HG (C8/T1)  R                     5/5                 5/5                         5/5                           5/5                   5/5  L                      5/5                 5/5                         5/5                           5/5                   5/5  Lowers      HF(L1/L2)     KE (L3)     DF (L4)     EHL (L5)     PF (S1)      R                     5/5              5/5           5/5           5/5            5/5  L                     5/5               5/5          5/5            5/5            5/5  SENSATION: grossly intact to light touch all extremities  COORDINATION: Gait intact; rapid alternating movements intact; heel to shin intact; no upper extremity dysmetria  CHEST/LUNG: Clear to auscultation bilaterally; no rales, rhonchi, wheezing, or rubs  HEART: +S1/+S2; Regular rate and rhythm; no murmurs, rubs, or gallops  ABDOMEN: Soft, nontender, nondistended; bowel sounds present all four quadrants  EXTREMITIES:  2+ peripheral pulses, no clubbing, cyanosis, or edema  SKIN: Warm, dry; no rashes or lesions    LABS:                        12.3   18.12 )-----------( 513      ( 08 Jul 2022 04:19 )             37.1     07-08    132<L>  |  102  |  11.2  ----------------------------<  113<H>  3.7   |  17.0<L>  |  0.63    Ca    9.0      08 Jul 2022 04:19  Phos  2.9     07-08  Mg     1.8     07-08 07-07 @ 07:01  -  07-08 @ 07:00  --------------------------------------------------------  IN: 1057.5 mL / OUT: 2265 mL / NET: -1207.5 mL        RADIOLOGY & ADDITIONAL TESTS:          CAPRINI SCORE [CLOT]:  Patient has an estimated Caprini score of greater than 5.  However, the patient's unique clinical situation will be addressed in an individual manner to determine appropriate anticoagulation treatment, if any. HPI:  46yoF PMH HLD, chronic back pain, migraines, transferred from [facility] p/o from repair of CSF leak. Pt initially presented to [facility] on 6/21 w/ L nare nasal drainage since after a COVID swab 5/28. Pt needed swab performed to travel to [place]. While in [place], pt had persistent rhinorrhea since COVID swab, associated fever, HA, cough and generalized weakness. 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 Pt completed 1 week of augmentin. [place] 6[place] and f/u w/ neurology/Dr Adames who sent her for further evaluation. She continued to have fevers up to 102 and came to [facility] ED on 6/20. CTH showed Mildly low-lying cerebellar tonsils. Given Ceftriaxone & Vanco x1. 6/22 ID consulted and started on Cefepime and Vanco. MRI brain +/- & MRV showed  Unremarkable MRI of the brain with and without contrast. Fluid in the left ethmoid and maxillary sinus may be related to CSF leak versus sinusitis. Recommend CT cisternogram for further evaluation. Normal intracranial MR venogram. 6/23 CSF PCR + enterovirus. 6/25 CT maxillofacial Thinning of the cribriform plate with fluid in the left ethmoid and maxillary sinus likely related to CSF leak. 6/28 Transferred to [facility] for surgical management coordinated w/ ENT. 6/29 OR for endoscopic endonasal repair of CSF leak w/ skull base reconstruction and placement of LD. LD @10cc/hr. P/o c/o frontal HA improved w/ pain medication. No further nasal drainage or salty/metallic taste. 7/1 CTH very small interhemispheric SDH stable on subsequent CTH 7/2. 7/5 LD removed and transferred back to [facility] ICU. Currently c/o mild L temporal HA worse w/ lying down.     INTERVAL HPI:  6/29: Transsphenoidal repair of CSF leak w/ skull based reconstruction + lumbar drain placement at [facility]  7/1: Small interhemispheric SDH noted, stable on subsequent CTH  7/5: Trns to [facility], lumbar drain D/C'd  7/6: LP opening pressure 33  7/7: OR for placement of R  shunt (Strata 2 @ 1.5)       Vital Signs Last 24 Hrs  T(C): 36.4 (08 Jul 2022 07:44), Max: 36.7 (07 Jul 2022 11:35)  T(F): 97.6 (08 Jul 2022 07:44), Max: 98.1 (07 Jul 2022 15:15)  HR: 94 (08 Jul 2022 08:00) (77 - 124)  BP: 141/73 (08 Jul 2022 08:00) (107/85 - 152/77)  BP(mean): 91 (08 Jul 2022 08:00) (83 - 108)  RR: 18 (08 Jul 2022 08:00) (11 - 23)  SpO2: 95% (08 Jul 2022 08:00) (93% - 100%)    Parameters below as of 08 Jul 2022 08:00  Patient On (Oxygen Delivery Method): room air      PHYSICAL EXAM:  GENERAL: NAD, well-groomed  HEAD:  s/p R  shunt, dressing C/D/I, normocephalic  DHIRAJ COMA SCORE: E-4 V-5 M-6 = 15  MENTAL STATUS: AAO x3; Awake; Opens eyes spontaneously; Appropriately conversant without aphasia; following simple commands  CRANIAL NERVES: L periorbital edema; Visual acuity normal for age, visual fields full to confrontation, PERRL. EOMI without nystagmus. Facial sensation intact V1-3 distribution b/l. Face symmetric w/ normal eye closure and smile, tongue midline.   MOTOR: strength 5/5 b/l upper and lower extremities  SENSATION: grossly intact to light touch all extremities  EXTREMITIES:  2+ peripheral pulses, no clubbing, cyanosis, or edema  SKIN: Warm, dry; no rashes or lesions    LABS:                        12.3   18.12 )-----------( 513      ( 08 Jul 2022 04:19 )             37.1     07-08    132<L>  |  102  |  11.2  ----------------------------<  113<H>  3.7   |  17.0<L>  |  0.63    Ca    9.0      08 Jul 2022 04:19  Phos  2.9     07-08  Mg     1.8     07-08 07-07 @ 07:01 - 07-08 @ 07:00  --------------------------------------------------------  IN: 1057.5 mL / OUT: 2265 mL / NET: -1207.5 mL        RADIOLOGY & ADDITIONAL TESTS:  < from: CT Head No Cont (07.07.22 @ 18:23) >    IMPRESSION: Right frontal ventricular catheter with tip in the left basal   ganglia. Decreased ventricular size compared with previous examinations   earlier today.    --- End of Report ---            CARLOZ SMITH MD; Attending Radiologist  This document has been electronically signed. Jul 7 2022  8:35PM    < end of copied text >    ASSESSMENT:  46F who presented after COVID swab with persistent rhinorrhea, found to have CSF leak. Tx to [facility] for endoscopic endonasal repair of CSF leak with lumbar drain on 6/29. Lumbar drain removed 7/5 with high pressure CSF outflow from removal site. Tx back to [facility] 7/5 for further care. Now s/p VPS placement, strata 2 @ 1.5, on 7/7.    PLAN:    Neuro:  -q4h neuro checks  -Topiramate 25 mg  -Melatonin 5 mg  -PRN: Tylenol, Oxy 5/10, Flexeril 10mg TID  - HPI:  46yoF PMH HLD, chronic back pain, migraines, transferred from [facility] p/o from repair of CSF leak. Pt initially presented to [facility] on 6/21 w/ L nare nasal drainage since after a COVID swab 5/28. Pt needed swab performed to travel to [place]. While in [place], pt had persistent rhinorrhea since COVID swab, associated fever, HA, cough and generalized weakness. 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 Pt completed 1 week of augmentin. [place] 6[place] and f/u w/ neurology/Dr Adames who sent her for further evaluation. She continued to have fevers up to 102 and came to [facility] ED on 6/20. CTH showed Mildly low-lying cerebellar tonsils. Given Ceftriaxone & Vanco x1. 6/22 ID consulted and started on Cefepime and Vanco. MRI brain +/- & MRV showed  Unremarkable MRI of the brain with and without contrast. Fluid in the left ethmoid and maxillary sinus may be related to CSF leak versus sinusitis. Recommend CT cisternogram for further evaluation. Normal intracranial MR venogram. 6/23 CSF PCR + enterovirus. 6/25 CT maxillofacial Thinning of the cribriform plate with fluid in the left ethmoid and maxillary sinus likely related to CSF leak. 6/28 Transferred to [facility] for surgical management coordinated w/ ENT. 6/29 OR for endoscopic endonasal repair of CSF leak w/ skull base reconstruction and placement of LD. LD @10cc/hr. P/o c/o frontal HA improved w/ pain medication. No further nasal drainage or salty/metallic taste. 7/1 CTH very small interhemispheric SDH stable on subsequent CTH 7/2. 7/5 LD removed and transferred back to [facility] ICU. Currently c/o mild L temporal HA worse w/ lying down.     INTERVAL HPI:  6/29: Transsphenoidal repair of CSF leak w/ skull based reconstruction + lumbar drain placement at [facility]  7/1: Small interhemispheric SDH noted, stable on subsequent CTH  7/5: Trns to [facility], lumbar drain D/C'd  7/6: LP opening pressure 33  7/7: OR for placement of R  shunt (Strata 2 @ 1.5)       Vital Signs Last 24 Hrs  T(C): 36.4 (08 Jul 2022 07:44), Max: 36.7 (07 Jul 2022 11:35)  T(F): 97.6 (08 Jul 2022 07:44), Max: 98.1 (07 Jul 2022 15:15)  HR: 94 (08 Jul 2022 08:00) (77 - 124)  BP: 141/73 (08 Jul 2022 08:00) (107/85 - 152/77)  BP(mean): 91 (08 Jul 2022 08:00) (83 - 108)  RR: 18 (08 Jul 2022 08:00) (11 - 23)  SpO2: 95% (08 Jul 2022 08:00) (93% - 100%)    Parameters below as of 08 Jul 2022 08:00  Patient On (Oxygen Delivery Method): room air      PHYSICAL EXAM:  GENERAL: NAD, well-groomed  HEAD:  s/p R  shunt, dressing C/D/I, normocephalic  DHIRAJ COMA SCORE: E-4 V-5 M-6 = 15  MENTAL STATUS: AAO x3; Awake; Opens eyes spontaneously; Appropriately conversant without aphasia; following simple commands  CRANIAL NERVES: L periorbital edema; Visual acuity normal for age, visual fields full to confrontation, PERRL. EOMI without nystagmus. Facial sensation intact V1-3 distribution b/l. Face symmetric w/ normal eye closure and smile, tongue midline.   MOTOR: strength 5/5 b/l upper and lower extremities  SENSATION: grossly intact to light touch all extremities  EXTREMITIES:  2+ peripheral pulses, no clubbing, cyanosis, or edema  SKIN: Warm, dry; no rashes or lesions    LABS:                        12.3   18.12 )-----------( 513      ( 08 Jul 2022 04:19 )             37.1     07-08    132<L>  |  102  |  11.2  ----------------------------<  113<H>  3.7   |  17.0<L>  |  0.63    Ca    9.0      08 Jul 2022 04:19  Phos  2.9     07-08  Mg     1.8     07-08 07-07 @ 07:01 - 07-08 @ 07:00  --------------------------------------------------------  IN: 1057.5 mL / OUT: 2265 mL / NET: -1207.5 mL        RADIOLOGY & ADDITIONAL TESTS:  < from: CT Head No Cont (07.07.22 @ 18:23) >    IMPRESSION: Right frontal ventricular catheter with tip in the left basal   ganglia. Decreased ventricular size compared with previous examinations   earlier today.    --- End of Report ---            CARLOZ SMITH MD; Attending Radiologist  This document has been electronically signed. Jul 7 2022  8:35PM    < end of copied text >    ASSESSMENT:  46F who presented after COVID swab with persistent rhinorrhea, found to have CSF leak. Tx to [facility] for endoscopic endonasal repair of CSF leak with lumbar drain on 6/29. Lumbar drain removed 7/5 with high pressure CSF outflow from removal site. Tx back to [facility] 7/5 for further care. Now s/p VPS placement, strata 2 @ 1.5, on 7/7.    PLAN:    Neuro:  -q4h neuro checks  -Topiramate 25 mg  -Melatonin 5 mg  -PRN: Tylenol, Oxy 5/10, Flexeril 10mg TID  -Nasal saline spray QID  -Transsphenoidal precautions; nothing by nares until cleared by NSG; no straws, OGT/NGT, NC/high flow, nose blowing  -CTH in AM 7/9    CV: -160  -Lipitor 40  -PRN: Hydralazine/Labetalol    Resp:  -RA    GI: Vegan diet  -Bowel regimen  -PRN Zofran    :  -TOV  -Salt tabs 1g TID    Heme:  -SCDs for DVT PPX  -Lovenox to begin tonight (7/8)    ID:   -Ana-op Ancef    Lines, skin, other:  -Suture at lumbar drain site  -Nasal dressing  -Artificial tears     Patient is stable and appropriate to be downgraded to neurosurgery floor, any bed. Patient signed out to neurosurgery team, all questions/concerns answered. HPI:  46yoF PMH HLD, chronic back pain, migraines, transferred from [facility] p/o from repair of CSF leak. Pt initially presented to [facility] on 6/21 w/ L nare nasal drainage since after a COVID swab 5/28. Pt needed swab performed to travel to [place]. While in [place], pt had persistent rhinorrhea since COVID swab, associated fever, HA, cough and generalized weakness. 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 Pt completed 1 week of augmentin. [place] 6[place] and f/u w/ neurology/Dr Adames who sent her for further evaluation. She continued to have fevers up to 102 and came to [facility] ED on 6/20. CTH showed Mildly low-lying cerebellar tonsils. Given Ceftriaxone & Vanco x1. 6/22 ID consulted and started on Cefepime and Vanco. MRI brain +/- & MRV showed  Unremarkable MRI of the brain with and without contrast. Fluid in the left ethmoid and maxillary sinus may be related to CSF leak versus sinusitis. Recommend CT cisternogram for further evaluation. Normal intracranial MR venogram. 6/23 CSF PCR + enterovirus. 6/25 CT maxillofacial Thinning of the cribriform plate with fluid in the left ethmoid and maxillary sinus likely related to CSF leak. 6/28 Transferred to [facility] for surgical management coordinated w/ ENT. 6/29 OR for endoscopic endonasal repair of CSF leak w/ skull base reconstruction and placement of LD. LD @10cc/hr. P/o c/o frontal HA improved w/ pain medication. No further nasal drainage or salty/metallic taste. 7/1 CTH very small interhemispheric SDH stable on subsequent CTH 7/2. 7/5 LD removed and transferred back to [facility] ICU. Currently c/o mild L temporal HA worse w/ lying down.     INTERVAL HPI:  6/29: Transsphenoidal repair of CSF leak w/ skull based reconstruction + lumbar drain placement at [facility]  7/1: Small interhemispheric SDH noted, stable on subsequent CTH  7/5: Trns to [facility], lumbar drain D/C'd  7/6: LP opening pressure 33  7/7: OR for placement of R  shunt (Strata 2 @ 1.5)       Vital Signs Last 24 Hrs  T(C): 36.4 (08 Jul 2022 07:44), Max: 36.7 (07 Jul 2022 11:35)  T(F): 97.6 (08 Jul 2022 07:44), Max: 98.1 (07 Jul 2022 15:15)  HR: 94 (08 Jul 2022 08:00) (77 - 124)  BP: 141/73 (08 Jul 2022 08:00) (107/85 - 152/77)  BP(mean): 91 (08 Jul 2022 08:00) (83 - 108)  RR: 18 (08 Jul 2022 08:00) (11 - 23)  SpO2: 95% (08 Jul 2022 08:00) (93% - 100%)    Parameters below as of 08 Jul 2022 08:00  Patient On (Oxygen Delivery Method): room air      PHYSICAL EXAM:  GENERAL: NAD, well-groomed  HEAD:  s/p R  shunt, dressing C/D/I, normocephalic  DHIRAJ COMA SCORE: E-4 V-5 M-6 = 15  MENTAL STATUS: AAO x3; Awake; Opens eyes spontaneously; Appropriately conversant without aphasia; following simple commands  CRANIAL NERVES: L periorbital edema; Visual acuity normal for age, visual fields full to confrontation, PERRL. EOMI without nystagmus. Facial sensation intact V1-3 distribution b/l. Face symmetric w/ normal eye closure and smile, tongue midline.   MOTOR: strength 5/5 b/l upper and lower extremities  SENSATION: grossly intact to light touch all extremities  EXTREMITIES:  2+ peripheral pulses, no clubbing, cyanosis, or edema  SKIN: Warm, dry; no rashes or lesions    LABS:                        12.3   18.12 )-----------( 513      ( 08 Jul 2022 04:19 )             37.1     07-08    132<L>  |  102  |  11.2  ----------------------------<  113<H>  3.7   |  17.0<L>  |  0.63    Ca    9.0      08 Jul 2022 04:19  Phos  2.9     07-08  Mg     1.8     07-08 07-07 @ 07:01 - 07-08 @ 07:00  --------------------------------------------------------  IN: 1057.5 mL / OUT: 2265 mL / NET: -1207.5 mL        RADIOLOGY & ADDITIONAL TESTS:  < from: CT Head No Cont (07.07.22 @ 18:23) >    IMPRESSION: Right frontal ventricular catheter with tip in the left basal   ganglia. Decreased ventricular size compared with previous examinations   earlier today.    --- End of Report ---            CARLOZ SMITH MD; Attending Radiologist  This document has been electronically signed. Jul 7 2022  8:35PM    < end of copied text >    ASSESSMENT:  46F who presented after COVID swab with persistent rhinorrhea, found to have CSF leak. Tx to [facility] for endoscopic endonasal repair of CSF leak with lumbar drain on 6/29. Lumbar drain removed 7/5 with high pressure CSF outflow from removal site. Tx back to [facility] 7/5 for further care. Now s/p VPS placement, strata 2 @ 1.5, on 7/7.    PLAN:    Neuro:  -q4h neuro checks  -Topiramate 25 mg  -Melatonin 5 mg  -PRN: Tylenol, Oxy 5/10, Flexeril 10mg TID  -Nasal saline spray QID  -Transsphenoidal precautions; nothing by nares until cleared by NSG; no straws, OGT/NGT, NC/high flow, nose blowing  -CTH in AM 7/9  -PT/OT/OOB    CV: -160  -Lipitor 40  -PRN: Hydralazine/Labetalol    Resp:  -RA    GI: Vegan diet  -Bowel regimen  -PRN Zofran    :  -TOV  -Salt tabs 1g TID    Heme:  -SCDs for DVT PPX  -Lovenox to begin tonight (7/8)    ID:   -Ana-op Ancef    Lines, skin, other:  -Suture at lumbar drain site  -Nasal dressing  -Artificial tears     Patient is stable and appropriate to be downgraded to neurosurgery floor, any bed. Patient signed out to neurosurgery team, all questions/concerns answered.

## 2022-07-08 NOTE — PROGRESS NOTE ADULT - ASSESSMENT
Assessment:  46F who presented after COVID swab with persistent rhinorrhea, found to have CSF leak. Tx to [facility] for endoscopic endonasal repair of CSF leak with lumbar drain on 6/29. Lumbar drain removed 7/5 with high pressure CSF outflow from removal site. Tx back to [facility] 7/5 for further care. Now s/p VPS placement, strata 2 @ 1.5, on 7/7  - POD 9/1  - LP showed high opening pressure    Plan:   - Q1 hour neuro checks  - SBP goal 100-130 postop, discuss liberalizing in AM   - Transphenoidal precautions in place   - DVT prophylaxis: SCDs only, no lovenox 2/2 shunt placement  - Pain control PRN, avoid oversedation  - PT/OT/OOB to chair, maintain HOB @ 30 degrees minimum  - Incentive spirometry  - Further care per NSICU team  - Final plan pending morning rounds with attending

## 2022-07-09 LAB
ANION GAP SERPL CALC-SCNC: 13 MMOL/L — SIGNIFICANT CHANGE UP (ref 5–17)
BUN SERPL-MCNC: 8.3 MG/DL — SIGNIFICANT CHANGE UP (ref 8–20)
CALCIUM SERPL-MCNC: 9.1 MG/DL — SIGNIFICANT CHANGE UP (ref 8.6–10.2)
CHLORIDE SERPL-SCNC: 102 MMOL/L — SIGNIFICANT CHANGE UP (ref 98–107)
CO2 SERPL-SCNC: 23 MMOL/L — SIGNIFICANT CHANGE UP (ref 22–29)
CREAT SERPL-MCNC: 0.63 MG/DL — SIGNIFICANT CHANGE UP (ref 0.5–1.3)
EGFR: 109 ML/MIN/1.73M2 — SIGNIFICANT CHANGE UP
GLUCOSE SERPL-MCNC: 96 MG/DL — SIGNIFICANT CHANGE UP (ref 70–99)
HCT VFR BLD CALC: 37.3 % — SIGNIFICANT CHANGE UP (ref 34.5–45)
HGB BLD-MCNC: 12.1 G/DL — SIGNIFICANT CHANGE UP (ref 11.5–15.5)
MAGNESIUM SERPL-MCNC: 2 MG/DL — SIGNIFICANT CHANGE UP (ref 1.6–2.6)
MCHC RBC-ENTMCNC: 27.4 PG — SIGNIFICANT CHANGE UP (ref 27–34)
MCHC RBC-ENTMCNC: 32.4 GM/DL — SIGNIFICANT CHANGE UP (ref 32–36)
MCV RBC AUTO: 84.4 FL — SIGNIFICANT CHANGE UP (ref 80–100)
PHOSPHATE SERPL-MCNC: 2.8 MG/DL — SIGNIFICANT CHANGE UP (ref 2.4–4.7)
PLATELET # BLD AUTO: 380 K/UL — SIGNIFICANT CHANGE UP (ref 150–400)
POTASSIUM SERPL-MCNC: 3.7 MMOL/L — SIGNIFICANT CHANGE UP (ref 3.5–5.3)
POTASSIUM SERPL-SCNC: 3.7 MMOL/L — SIGNIFICANT CHANGE UP (ref 3.5–5.3)
RBC # BLD: 4.42 M/UL — SIGNIFICANT CHANGE UP (ref 3.8–5.2)
RBC # FLD: 13.3 % — SIGNIFICANT CHANGE UP (ref 10.3–14.5)
SODIUM SERPL-SCNC: 138 MMOL/L — SIGNIFICANT CHANGE UP (ref 135–145)
WBC # BLD: 10.91 K/UL — HIGH (ref 3.8–10.5)
WBC # FLD AUTO: 10.91 K/UL — HIGH (ref 3.8–10.5)

## 2022-07-09 PROCEDURE — 70450 CT HEAD/BRAIN W/O DYE: CPT | Mod: 26

## 2022-07-09 RX ADMIN — Medication 1 SPRAY(S): at 05:46

## 2022-07-09 RX ADMIN — SODIUM CHLORIDE 1 GRAM(S): 9 INJECTION INTRAMUSCULAR; INTRAVENOUS; SUBCUTANEOUS at 21:55

## 2022-07-09 RX ADMIN — Medication 100 MILLIGRAM(S): at 13:42

## 2022-07-09 RX ADMIN — SODIUM CHLORIDE 1 GRAM(S): 9 INJECTION INTRAMUSCULAR; INTRAVENOUS; SUBCUTANEOUS at 05:46

## 2022-07-09 RX ADMIN — ATORVASTATIN CALCIUM 40 MILLIGRAM(S): 80 TABLET, FILM COATED ORAL at 21:55

## 2022-07-09 RX ADMIN — SODIUM CHLORIDE 1 GRAM(S): 9 INJECTION INTRAMUSCULAR; INTRAVENOUS; SUBCUTANEOUS at 13:43

## 2022-07-09 RX ADMIN — Medication 1 SPRAY(S): at 17:11

## 2022-07-09 RX ADMIN — Medication 1 DROP(S): at 13:43

## 2022-07-09 RX ADMIN — Medication 1 SPRAY(S): at 11:41

## 2022-07-09 RX ADMIN — Medication 1 DROP(S): at 21:56

## 2022-07-09 RX ADMIN — Medication 25 MILLIGRAM(S): at 11:41

## 2022-07-09 RX ADMIN — SENNA PLUS 1 TABLET(S): 8.6 TABLET ORAL at 17:11

## 2022-07-09 RX ADMIN — Medication 100 MILLIGRAM(S): at 05:45

## 2022-07-09 RX ADMIN — ENOXAPARIN SODIUM 40 MILLIGRAM(S): 100 INJECTION SUBCUTANEOUS at 17:10

## 2022-07-09 RX ADMIN — SENNA PLUS 1 TABLET(S): 8.6 TABLET ORAL at 05:46

## 2022-07-09 NOTE — PROGRESS NOTE ADULT - SUBJECTIVE AND OBJECTIVE BOX
INTERVAL HPI/OVERNIGHT EVENTS:  46F presented s/p COVID swab c/o persistent rhinorrhea, found to have CSF leak. Trf to [facility] for endoscopic endonasal repair of CSF leak with lumbar drain on 6/29. Lumbar drain removed 7/5 with high pressure CSF outflow from removal site. Trf back to [facility] 7/5 for further care. LP done 7/6 illustrating high opening pressure.   s/p Right  shunt, strata II @ 1.5 POD#2  Patient seen and examined this morning with neurosurgical team. Patient denies any leaking from nose, ear, back of throat, salty taste, dizziness nausea, vomiting HA. Patient only complaining of abdominal incisional pain. Site inspected, wound well approximated,. no redness/dehiscences noted. CTH complete, stable. Pending XRAY Shunt series. Discussed w/ daughters, patient,  plan, pending shunt series. Patient states she believes she will feel comfortable leaving tomorrow. AM labs; sodium today 138.     Vital Signs Last 24 Hrs  T(C): 37.2 (09 Jul 2022 17:22), Max: 37.2 (09 Jul 2022 17:22)  T(F): 99 (09 Jul 2022 17:22), Max: 99 (09 Jul 2022 17:22)  HR: 96 (09 Jul 2022 17:22) (83 - 96)  BP: 138/83 (09 Jul 2022 17:22) (138/83 - 178/84)  BP(mean): --  RR: 18 (09 Jul 2022 17:22) (18 - 19)  SpO2: 96% (09 Jul 2022 17:22) (91% - 97%)    Parameters below as of 09 Jul 2022 17:22  Patient On (Oxygen Delivery Method): room air      PHYSICAL EXAM:  GENERAL: NAD, well-groomed, well-developed  HEAD:  S/p R crani. Dressing clean, dry, intact. Dried blood noted, not fully saturated.  WOUND: Cranial dressing clean, dry, intact. Abdominal incisions dry, edges approximated well, no redness/drainage/nontender to light palpation.   DHIRAJ COMA SCORE: E-4 V-5 M-6 = 15       E: 4= opens eyes spontaneously 3= to voice 2= to noxious 1= no opening       V: 5= oriented 4= confused 3= inappropriate words 2= incomprehensible sounds 1= nonverbal 1T= intubated       M: 6= follows commands 5= localizes 4= withdraws 3= flexor posturing 2= extensor posturing 1= no movement  MENTAL STATUS: AAO x3; Awake; Opens eyes spontaneously; Appropriately conversant without aphasia; following simple commands  CRANIAL NERVES: Visual acuity normal for age, PERRL. EOMI without nystagmus. Facial sensation intact V1-3 distribution b/l. Face symmetric w/ normal eye closure and smile, tongue midline. Hearing grossly intact. Speech clear.   MOTOR: strength 5/5 b/l upper and lower extremities; no drift  SENSATION: grossly intact to light touch all extremities  CHEST/LUNG: Nonlabored breaths  SKIN: Warm, dry      LABS:             12.1   10.91 )-----------( 380      ( 09 Jul 2022 07:05 )             37.3     07-09    138  |  102  |  8.3  ----------------------------<  96  3.7   |  23.0  |  0.63    Ca    9.1      09 Jul 2022 07:05  Phos  2.8     07-09  Mg     2.0     07-09 07-08 @ 07:01  -  07-09 @ 07:00  --------------------------------------------------------  IN: 550 mL / OUT: 750 mL / NET: -200 mL          RADIOLOGY & ADDITIONAL TESTS:  CT Head No Cont (07.09.22 @ 10:50)   IMPRESSION:   No interval change when compared with the prior CT study from   7/7/2022.

## 2022-07-09 NOTE — PROGRESS NOTE ADULT - ASSESSMENT
46F presented s/p COVID swab c/o persistent rhinorrhea, found to have CSF leak. Trf to [facility] for endoscopic endonasal repair of CSF leak with lumbar drain on 6/29. Lumbar drain removed 7/5 with high pressure CSF outflow from removal site. Trf back to [facility] 7/5 for further care. LP done 7/6 illustrating high opening pressure.   s/p Right  shunt, strata II @ 1.5 POD#2      Plan  - Q4 neuro checks  - HOB 30 degrees  - Pain control PRN: avoid oversedation. Tylenol 650q6, Oxy 5/10q4  - SBP <160; Hydralazine/Labetalol 10q2  - Keppra 500 BID  - Ancef 2g q8  - Lipitor 40  - Topamax 25  - Flexeril 10 TID PRN  - Advance diet as tolerated   - Zofran PRN  - Miralax/Senna- monitor BM's  - Continue Ocean spray PRN, Artifical tears    - Salt tabs 1g TID  - Incentive spirometry encouraged   - OOB w/ assistance   - b/l SCDs; Lovenox 40  - Will remove head dressing on 7/10/22 (POD#3). Leave open to air.   - May wash head on 7/15/22 AM (POD#5)- using mild soap, avoid tugging/pulling on incision. Pat dry.  - TRANSPHENOIDAL PRECAUTIONS: No straws, NGT, OGT, NC, High Flow oxygen, blowing, Nothing in nares unless cleared by neurosurgery team   - Xray Shunt series ordered x 2   - AM labs   - PT/OT  - General surgery team following  - Dispo planning for tomorrow  - D/w Dr. Hanson

## 2022-07-10 ENCOUNTER — TRANSCRIPTION ENCOUNTER (OUTPATIENT)
Age: 47
End: 2022-07-10

## 2022-07-10 VITALS
RESPIRATION RATE: 18 BRPM | TEMPERATURE: 99 F | SYSTOLIC BLOOD PRESSURE: 134 MMHG | HEART RATE: 95 BPM | DIASTOLIC BLOOD PRESSURE: 85 MMHG | OXYGEN SATURATION: 94 %

## 2022-07-10 LAB
ANION GAP SERPL CALC-SCNC: 11 MMOL/L — SIGNIFICANT CHANGE UP (ref 5–17)
APTT BLD: 34.3 SEC — SIGNIFICANT CHANGE UP (ref 27.5–35.5)
BUN SERPL-MCNC: 8.6 MG/DL — SIGNIFICANT CHANGE UP (ref 8–20)
CALCIUM SERPL-MCNC: 9.4 MG/DL — SIGNIFICANT CHANGE UP (ref 8.6–10.2)
CHLORIDE SERPL-SCNC: 102 MMOL/L — SIGNIFICANT CHANGE UP (ref 98–107)
CO2 SERPL-SCNC: 26 MMOL/L — SIGNIFICANT CHANGE UP (ref 22–29)
CREAT SERPL-MCNC: 0.61 MG/DL — SIGNIFICANT CHANGE UP (ref 0.5–1.3)
EGFR: 110 ML/MIN/1.73M2 — SIGNIFICANT CHANGE UP
GLUCOSE SERPL-MCNC: 97 MG/DL — SIGNIFICANT CHANGE UP (ref 70–99)
HCT VFR BLD CALC: 39.7 % — SIGNIFICANT CHANGE UP (ref 34.5–45)
HGB BLD-MCNC: 12.9 G/DL — SIGNIFICANT CHANGE UP (ref 11.5–15.5)
INR BLD: 1.24 RATIO — HIGH (ref 0.88–1.16)
MAGNESIUM SERPL-MCNC: 2.1 MG/DL — SIGNIFICANT CHANGE UP (ref 1.6–2.6)
MCHC RBC-ENTMCNC: 27.6 PG — SIGNIFICANT CHANGE UP (ref 27–34)
MCHC RBC-ENTMCNC: 32.5 GM/DL — SIGNIFICANT CHANGE UP (ref 32–36)
MCV RBC AUTO: 85 FL — SIGNIFICANT CHANGE UP (ref 80–100)
PHOSPHATE SERPL-MCNC: 3.2 MG/DL — SIGNIFICANT CHANGE UP (ref 2.4–4.7)
PLATELET # BLD AUTO: 461 K/UL — HIGH (ref 150–400)
POTASSIUM SERPL-MCNC: 4 MMOL/L — SIGNIFICANT CHANGE UP (ref 3.5–5.3)
POTASSIUM SERPL-SCNC: 4 MMOL/L — SIGNIFICANT CHANGE UP (ref 3.5–5.3)
PROTHROM AB SERPL-ACNC: 14.4 SEC — HIGH (ref 10.5–13.4)
RBC # BLD: 4.67 M/UL — SIGNIFICANT CHANGE UP (ref 3.8–5.2)
RBC # FLD: 13.2 % — SIGNIFICANT CHANGE UP (ref 10.3–14.5)
SODIUM SERPL-SCNC: 139 MMOL/L — SIGNIFICANT CHANGE UP (ref 135–145)
WBC # BLD: 8.62 K/UL — SIGNIFICANT CHANGE UP (ref 3.8–10.5)
WBC # FLD AUTO: 8.62 K/UL — SIGNIFICANT CHANGE UP (ref 3.8–10.5)

## 2022-07-10 PROCEDURE — 74018 RADEX ABDOMEN 1 VIEW: CPT | Mod: 26

## 2022-07-10 PROCEDURE — 71045 X-RAY EXAM CHEST 1 VIEW: CPT | Mod: 26

## 2022-07-10 PROCEDURE — 70250 X-RAY EXAM OF SKULL: CPT | Mod: 26

## 2022-07-10 RX ORDER — CYCLOBENZAPRINE HYDROCHLORIDE 10 MG/1
1 TABLET, FILM COATED ORAL
Qty: 0 | Refills: 0 | DISCHARGE

## 2022-07-10 RX ORDER — CYCLOBENZAPRINE HYDROCHLORIDE 10 MG/1
1 TABLET, FILM COATED ORAL
Qty: 0 | Refills: 0 | DISCHARGE
Start: 2022-07-10

## 2022-07-10 RX ORDER — SODIUM CHLORIDE 9 MG/ML
1 INJECTION INTRAMUSCULAR; INTRAVENOUS; SUBCUTANEOUS
Qty: 60 | Refills: 0
Start: 2022-07-10 | End: 2022-08-08

## 2022-07-10 RX ADMIN — Medication 25 MILLIGRAM(S): at 13:08

## 2022-07-10 RX ADMIN — SODIUM CHLORIDE 1 GRAM(S): 9 INJECTION INTRAMUSCULAR; INTRAVENOUS; SUBCUTANEOUS at 05:24

## 2022-07-10 RX ADMIN — Medication 1 SPRAY(S): at 05:25

## 2022-07-10 RX ADMIN — SENNA PLUS 1 TABLET(S): 8.6 TABLET ORAL at 05:24

## 2022-07-10 RX ADMIN — SODIUM CHLORIDE 1 GRAM(S): 9 INJECTION INTRAMUSCULAR; INTRAVENOUS; SUBCUTANEOUS at 13:08

## 2022-07-10 RX ADMIN — Medication 1 DROP(S): at 05:25

## 2022-07-10 NOTE — DISCHARGE NOTE PROVIDER - NSDCCPCAREPLAN_GEN_ALL_CORE_FT
PRINCIPAL DISCHARGE DIAGNOSIS  Diagnosis: Increased intracranial pressure  Assessment and Plan of Treatment:       SECONDARY DISCHARGE DIAGNOSES  Diagnosis: CSF leak from nose  Assessment and Plan of Treatment:

## 2022-07-10 NOTE — DISCHARGE NOTE PROVIDER - NSDCMRMEDTOKEN_GEN_ALL_CORE_FT
acetaminophen 325 mg oral tablet: 2 tab(s) orally every 6 hours, As needed, Temp greater or equal to 38C (100.4F), Mild Pain (1 - 3)  atorvastatin 40 mg oral tablet: 1 tab(s) orally once a day  CMP, Mg, Phos: CMP, Mg, Phos before 7/20/22  please send results to Dr Hanson or ENT Dr Choi or pt&#x27;s PCP  for f/u hyponatremia, last Na+ 139 on 7/10 on 1g TID  cyclobenzaprine 10 mg oral tablet: 1 tab(s) orally 3 times a day, As needed, Muscle Spasm  melatonin 5 mg oral tablet: 1 tab(s) orally once a day (at bedtime), As needed, Insomnia  Occupational Therapy : Please Evaluate and Treat post-op   ocular lubricant ophthalmic solution: 1 drop(s) to each affected eye 3 times a day  oxyCODONE 5 mg oral tablet: 1 tab(s) orally every 4 hours, As Needed, moderate pain  polyethylene glycol 3350 oral powder for reconstitution: 17 gram(s) orally once a day  senna leaf extract oral tablet: 1 tab(s) orally 2 times a day  sodium chloride 0.65% nasal spray: 1 spray(s) nasal 4 times a day  sodium chloride 1 g oral tablet: 1 tab(s) orally 2 times a day   topiramate 25 mg oral tablet: 1 tab(s) orally once a day

## 2022-07-10 NOTE — DISCHARGE NOTE PROVIDER - NSDCFUADDAPPT_GEN_ALL_CORE_FT
pls call to schedule appointments w ENT 1 week from d/c for nasal post-op eval and NSx before 7/20 for surgical wound eval and staple removal.     please obtain f/u labs for electrolytes management before office visit w ENT or Dr Hanson. Rx provided. TY

## 2022-07-10 NOTE — DISCHARGE NOTE PROVIDER - NSDCFUADDINST_GEN_ALL_CORE_FT
ok to shower while avoiding direct water to the incision, ok to have water run over the incision, use mild soap/ baby shampoo to wash while showering/ pat dry afterwards  cover incision as needed w a clean/cotton hat or cap to avoid irritation/ rubbing/friction.   no hot tubs /jacuzzi until cleared by Dr Hanson / office NP after outpatient visit.  call office if have wound discharge/swelling/redness/ worsening pain or fever vs return to the hospital ED      avoid blowing your nose or manipulation/swabs/digital trauma & dab drainage w tissue

## 2022-07-10 NOTE — DISCHARGE NOTE PROVIDER - CARE PROVIDER_API CALL
Jose Antonio Hanson; PhD)  Neurosurgery  [address] [place] [address]  Phone: [phone]  Fax: [phone]  Established Patient  Follow Up Time: 2 months    Gilbert Choi)  Otolaryngology  [address] [place] [address] [address]  [place] [address]  Phone: [phone]  Fax: [phone]  Established Patient  Follow Up Time: 1 week

## 2022-07-10 NOTE — DISCHARGE NOTE PROVIDER - PROVIDER TOKENS
PROVIDER:[TOKEN:[[id number]],FOLLOWUP:[2 months],ESTABLISHEDPATIENT:[T]],PROVIDER:[TOKEN:[[id number]],FOLLOWUP:[1 week],ESTABLISHEDPATIENT:[T]]

## 2022-07-10 NOTE — DISCHARGE NOTE PROVIDER - HOSPITAL COURSE
INTERVAL HPI/OVERNIGHT EVENTS:  46F presented s/p COVID swab c/o persistent rhinorrhea, found to have CSF leak. Trf to [facility] for endoscopic endonasal repair of CSF leak with lumbar drain on 6/29. Lumbar drain removed 7/5 with high pressure CSF outflow from removal site. Trf back to [facility] 7/5 for further care. LP done 7/6 illustrating high opening pressure.   s/p Right  shunt, strata II @ 1.5 POD#3 and is doing well/ baseline nasal drainage after mustache dressing removed.  Patient seen and examined this morning with neurosurgical team. Patient denies any leaking from ear, back of throat, salty taste, dizziness nausea, vomiting HA. Patient only complaining of mild abdominal incisional pain. Site inspected, wound well approximated w dermatome. no redness/dehiscences noted to abdominal and cranial incision w staples present, incisions open to air w a bouffant. CTH complete, stable & XRAY Shunt series done today/ reviewed w Dr Hanson - patent w/o discontinuation or kinking/ official report pending.   Patient states she believes she will feel comfortable leaving today and f/u W Dr Hanson and Ky Solomon ENT at [facility].  pt has been seen by PT/OT and deemed     Vital Signs Last 24 Hrs  T(C): 37.1 (10 Jul 2022 08:00), Max: 37.2 (09 Jul 2022 17:22)  T(F): 98.7 (10 Jul 2022 08:00), Max: 99 (09 Jul 2022 17:22)  HR: 95 (10 Jul 2022 08:00) (86 - 96)  BP: 134/85 (10 Jul 2022 08:00) (126/78 - 142/84)  BP(mean): --  RR: 18 (10 Jul 2022 08:00) (17 - 19)  SpO2: 94% (10 Jul 2022 08:00) (94% - 96%)    Parameters below as of 10 Jul 2022 08:00  Patient On (Oxygen Delivery Method): room air      I&O's Summary    09 Jul 2022 07:01  -  10 Jul 2022 07:00  --------------------------------------------------------  IN: 0 mL / OUT: 200 mL / NET: -200 mL                              12.9   8.62  )-----------( 461      ( 10 Jul 2022 08:42 )             39.7     07-10    139  |  102  |  8.6  ----------------------------<  97  4.0   |  26.0  |  0.61    Ca    9.4      10 Jul 2022 08:42  Phos  3.2     07-10  Mg     2.1     07-10      < from: CT Head No Cont (07.09.22 @ 10:50) >  IMPRESSION: No interval change when compared with the prior CT study from 7/7/2022.  < end of copied text > INTERVAL HPI/OVERNIGHT EVENTS:  46F presented s/p COVID swab c/o persistent rhinorrhea, found to have CSF leak. Trf to [facility] for endoscopic endonasal repair of CSF leak with lumbar drain on 6/29. Lumbar drain removed 7/5 with high pressure CSF outflow from removal site. Trf back to [facility] 7/5 for further care. LP done 7/6 illustrating high opening pressure.   s/p Right  shunt, strata II @ 1.5 POD#3 and is doing well/ baseline nasal drainage after mustache dressing removed.  Patient seen and examined this morning with neurosurgical team. Patient denies any leaking from ear, back of throat, salty taste, dizziness nausea, vomiting HA. Patient only complaining of mild abdominal incisional pain. Site inspected, wound well approximated w dermatome. no redness/dehiscences noted to abdominal and cranial incision w staples present, incisions open to air w a bouffant. CTH complete, stable & XRAY Shunt series done today/ reviewed w Dr Hanson - patent w/o discontinuation or kinking/ official report pending.   Patient states she believes she will feel comfortable leaving today and f/u W Dr Hanson and Ky Solomon ENT at [facility].  pt has been seen by PT/OT and deemed     Vital Signs Last 24 Hrs  T(C): 37.1 (10 Jul 2022 08:00), Max: 37.2 (09 Jul 2022 17:22)  T(F): 98.7 (10 Jul 2022 08:00), Max: 99 (09 Jul 2022 17:22)  HR: 95 (10 Jul 2022 08:00) (86 - 96)  BP: 134/85 (10 Jul 2022 08:00) (126/78 - 142/84)  BP(mean): --  RR: 18 (10 Jul 2022 08:00) (17 - 19)  SpO2: 94% (10 Jul 2022 08:00) (94% - 96%)    Parameters below as of 10 Jul 2022 08:00  Patient On (Oxygen Delivery Method): room air      I&O's Summary    09 Jul 2022 07:01  -  10 Jul 2022 07:00  --------------------------------------------------------  IN: 0 mL / OUT: 200 mL / NET: -200 mL                              12.9   8.62  )-----------( 461      ( 10 Jul 2022 08:42 )             39.7     07-10    139  |  102  |  8.6  ----------------------------<  97  4.0   |  26.0  |  0.61    Ca    9.4      10 Jul 2022 08:42  Phos  3.2     07-10  Mg     2.1     07-10      < from: CT Head No Cont (07.09.22 @ 10:50) >  IMPRESSION: No interval change when compared with the prior CT study from 7/7/2022.  < end of copied text >    Addendum:  The patient's subdural hemorrhage has no clinical significance with the CSF repair surgery.

## 2022-07-10 NOTE — DISCHARGE NOTE PROVIDER - NSDCACTIVITY_GEN_ALL_CORE
Do not drive or operate machinery/Showering allowed/Do not make important decisions/Walking - Indoors allowed/No heavy lifting/straining/Walking - Outdoors allowed Discharge planning issues

## 2022-07-10 NOTE — DISCHARGE NOTE NURSING/CASE MANAGEMENT/SOCIAL WORK - NSDCPEFALRISK_GEN_ALL_CORE
For information on Fall & Injury Prevention, visit: [email or web address] OR  [email or web address] OR  https://www.cdc.gov/steadi/patient.html

## 2022-07-10 NOTE — DISCHARGE NOTE NURSING/CASE MANAGEMENT/SOCIAL WORK - PATIENT PORTAL LINK FT
You can access the FollowMyHealth Patient Portal offered by [facility] by registering at the following website: [email or web address]. By joining [employer]’s FollowMyHealth portal, you will also be able to view your health information using other applications (apps) compatible with our system.

## 2022-07-10 NOTE — DISCHARGE NOTE PROVIDER - NSDCFUSCHEDAPPT_GEN_ALL_CORE_FT
Gilbert Choi  [facility]  OTOLARYNG [address] [place] [address]  Scheduled Appointment: 08/01/2022

## 2022-07-18 ENCOUNTER — APPOINTMENT (OUTPATIENT)
Dept: OTOLARYNGOLOGY | Facility: CLINIC | Age: 47
End: 2022-07-18

## 2022-07-18 VITALS
WEIGHT: 146 LBS | DIASTOLIC BLOOD PRESSURE: 82 MMHG | TEMPERATURE: 97.8 F | HEIGHT: 71 IN | BODY MASS INDEX: 20.44 KG/M2 | SYSTOLIC BLOOD PRESSURE: 127 MMHG | HEART RATE: 88 BPM

## 2022-07-18 PROCEDURE — 99024 POSTOP FOLLOW-UP VISIT: CPT

## 2022-07-18 PROCEDURE — 31237 NSL/SINS NDSC SURG BX POLYPC: CPT | Mod: 50,58

## 2022-07-18 NOTE — HISTORY OF PRESENT ILLNESS
[de-identified] : 46 year old female with history of spontaneous left nostril CSF leak\par s/p Endoscopic endonasal repair of cerebrospinal fluid leak with skull base reconstruction, lumbar drain placement with use of neuronavigation 06/29/22\par Presents today for post operative evaluation.\par After procedure - patient with elevated intra cranial pressure.\par s/p ventriculoperitoneal shunt placement 07/07/22\par Followed by Neuro Dr. Hanson - appointment on 7/21\par \par States Breathing without difficulty.\par Continues nasal washes daily\par Reports occasional bleeding and dried blood from the nose.\par Denies pain, fever, and clear fluid from the nose.

## 2022-07-18 NOTE — ASSESSMENT
[FreeTextEntry1] : 45 y/o F with history of spontaneous left nostril CSF leak,  s/p Endoscopic endonasal repair of cerebrospinal fluid leak with skull base reconstruction, lumbar drain placement with use of neuronavigation 06/29/22\par no leak per hx or on exam today, did not suction recon \par The patient was debrided bilaterally with rigid suction as a result of nasal crusting. breathing much improved after detriment. Patient should continue to avoid nose blowing, heavy lifting or exercising, and should start a regimen of over the counter nasal saline spray for moisturization of the nasal cavities\par will follow up to assure no scarring and keep sinuses open\par - Use nasal irrigation at least 3- 5 times a day\par

## 2022-07-18 NOTE — CONSULT LETTER
[FreeTextEntry1] : Dear Dr. LEÓN AMAYA,\par I had the pleasure of evaluating your patient SANDY PITTS, thank you for allowing us to participate in their care. please see full note detailing our visit below.\par If you have any questions, please do not hesitate to call me and I would be happy to discuss further. \par \par Gilbert Choi M.D.\par Attending Physician,  \par Department of Otolaryngology - Head and Neck Surgery\par [facility] \par Office: [phone]\par Fax: [phone]\par

## 2022-07-18 NOTE — PROCEDURE
[FreeTextEntry6] : procedure - bilateral endoscopic nasal  debridement\par Bilateral nasal cavities inspected with #0 ridged sinus endoscope. septum appeared midline and turbinates well reduced. bilateral middle meatuses were explored and suction and agitator were used to open ostiums and open any forming scar bands. all sinuses were explored and open at end of procedure\par did not suction recon \par

## 2022-07-18 NOTE — REASON FOR VISIT
[Post-Operative Visit] : a post-operative visit [FreeTextEntry2] : s/p Endoscopic endonasal repair of cerebrospinal fluid leak with skull base reconstruction, lumbar drain placement with use of neuronavigation 06/29/22

## 2022-07-18 NOTE — END OF VISIT
[FreeTextEntry3] : I personally saw and examined YOHAN CERVANTES in detail. I spoke to Tila HENDRICKS regarding the assessment and plan of care.  I preformed the procedures and I reviewed the above assessment and plan of care, and agree. I have made changes in changes in the body of the note where appropriate.\par

## 2022-07-21 ENCOUNTER — APPOINTMENT (OUTPATIENT)
Dept: NEUROSURGERY | Facility: CLINIC | Age: 47
End: 2022-07-21

## 2022-07-21 VITALS
BODY MASS INDEX: 30.38 KG/M2 | HEIGHT: 71 IN | DIASTOLIC BLOOD PRESSURE: 88 MMHG | OXYGEN SATURATION: 95 % | SYSTOLIC BLOOD PRESSURE: 143 MMHG | WEIGHT: 217 LBS | TEMPERATURE: 98 F | HEART RATE: 102 BPM

## 2022-07-21 PROCEDURE — ZZZZZ: CPT

## 2022-07-21 NOTE — PHYSICAL EXAM
[FreeTextEntry1] : Awake, alert, and oriented x 3. VSS. In no apparent distress.   Short and long term memory intact.  Speech is clear and appropriate.  Affect is normal.  Voice is strong.  Respirations easy and even.  Normal skin color and pigmentation.  The sclera and conjunctiva normal.  Ears, nose, and neck normal in appearance.  EOMI, no nystagmus, facial sensation intact symmetrically, face symmetrical, hearing intact bilaterally, tongue and palate midline, head turning and shoulder shrug symmetric and no tongue deviation with protrusion.  No pronator drift.   No past-pointing, no tremors noted, no dysdiadochokinesia, and finger to nose dysmetria was not present.  Romberg negative.  \par Right hand dominant.\par  shunt present.  Depresses readily and refills readily.  Incision healing well. Lumbar drain sutures removed without difficulty. No signs of infection. Abd stab wounds with glue intact healing well. \par Rises from a seated position in a comfortable fashion.\par \par Gait is well coordinated and stable without the use of an assistive device.   Able to perform tandem walk without loss of balance.   Motor strength in the upper extremities 5/5 in the biceps, triceps, and hand .  Motor strength in the lower extremities is 5/5 in the iliopsoas, quadriceps, and hamstrings.  \par \par

## 2022-07-21 NOTE — HISTORY OF PRESENT ILLNESS
[FreeTextEntry1] : 47 year old right handed female of [place] origin presents to the office with her daughter for a post operative check for CSF leak repair and VPS.\par On 5/28/2022, she had a covid swab done prior to traveling to [place].  Approximately one hour later, she noticed clear drainage from left nare. She did not think much of it and landed in [place] on 5/31. The next day, she developed a fever which continued the following day. Between the continued fever and leakage left nare, she went to MD who suspected a CSF leak. He placed her on Augmentin for one week. 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 CT also demonstrated the leak and an ENT recommended surgery for repair. She decided to fly home to [place] and saw Dr. Adames for evaluation. He recommended she go to ED at [facility] on 6/20. She was placed on antibiotics for 8 days which she spent at hospital. She was then transferred to [facility] 6/28 for further care and went to the OR 6/28 for repair of CSF leak with Dr. Choi, ENT with Lumbar drain. 7/1 ct scan showed very small interhemispheric SDH. 7/5 drain was removed and was transferred back to [facility] on 7/5 under the care of Dr. Hanson due to continued headaches. She underwent placement of Strata valve VPS on 7/7 for intracranial hypertension valve set at 1.5. She was discharged to home on 7/10. \par \par Today, she presents with her daughter. She denies c/o. Cranial staples intact and removed. VPS pumping and refilling adequately, set at 1.5. Incision clean and intact. Lumbar drain sutures removed without signs of infection. \par Patient denies headaches, dizziness, drainage from nares, or any other neurological symptoms. \par Patient aware to call office with concerns or questions. \par She saw ENT this week and said she is doing well. \par \par Plan: FU with Dr. Hanson end August, beginning of Sept 2022\par Non contrast brain ct\par FU with ENT

## 2022-08-01 ENCOUNTER — APPOINTMENT (OUTPATIENT)
Dept: OTOLARYNGOLOGY | Facility: CLINIC | Age: 47
End: 2022-08-01

## 2022-08-01 VITALS
HEART RATE: 92 BPM | TEMPERATURE: 98.3 F | SYSTOLIC BLOOD PRESSURE: 136 MMHG | BODY MASS INDEX: 30.38 KG/M2 | HEIGHT: 71 IN | WEIGHT: 217 LBS | DIASTOLIC BLOOD PRESSURE: 86 MMHG

## 2022-08-01 PROCEDURE — 31237 NSL/SINS NDSC SURG BX POLYPC: CPT | Mod: LT

## 2022-08-01 PROCEDURE — 99024 POSTOP FOLLOW-UP VISIT: CPT

## 2022-08-01 RX ORDER — ATORVASTATIN CALCIUM 40 MG/1
40 TABLET, FILM COATED ORAL
Qty: 90 | Refills: 0 | Status: ACTIVE | COMMUNITY
Start: 2022-03-25

## 2022-08-01 NOTE — END OF VISIT
[FreeTextEntry3] : I personally saw and examined SANDY PITTS in detail.  I spoke to GINETTE Coyne regarding the assessment and plan of care. I performed the procedures and relevant physical exam.  I have reviewed the above assessment and plan of care and I agree.  I have made changes to the body of the note wherever necessary and appropriate.\par

## 2022-08-01 NOTE — ASSESSMENT
[FreeTextEntry1] : 45 y/o F with history of spontaneous left nostril CSF leak,  s/p Endoscopic endonasal repair of cerebrospinal fluid leak with skull base reconstruction, lumbar drain placement with use of neuronavigation 06/29/22\par no leak per hx or on exam today, did not suction recon \par The patient was debrided bilaterally with rigid suction as a result of nasal crusting. breathing much improved after debridement. Patient should continue to avoid nose blowing, straw use, heavy lifting or exercising, and should start a regimen of over the counter nasal saline spray for moisturization of the nasal cavities\par will follow up to assure no scarring and keep sinuses open\par \par - Use nasal irrigation at least twice a day with added Mupiricin\par - f/u 3 weeks\par

## 2022-08-01 NOTE — HISTORY OF PRESENT ILLNESS
[de-identified] : 46 year old female with history of spontaneous left nostril CSF leak\par s/p Endoscopic endonasal repair of cerebrospinal fluid leak with skull base reconstruction, lumbar drain placement with use of neuronavigation 06/29/22\par After procedure - patient with elevated intra cranial pressure.\par s/p ventriculoperitoneal shunt placement 07/07/22\par Followed by Neuro Dr. Hanson - appointment on 7/21\par \par States Breathing without difficulty.\par Continues nasal washes daily\par Reports occasional bleeding and dried blood from the nose.\par Denies pain, fever, and clear fluid from the nose. [FreeTextEntry1] : saw neuro last month and healing well from their opinion, feels pressure in left nostril at times, and has recently been sneezing, doing evita med sinus rinse 5x daily

## 2022-08-01 NOTE — PROCEDURE
[FreeTextEntry6] : procedure - Left endoscopic nasal  debridement\par Bilateral nasal cavities inspected with #0 ridged sinus endoscope. septum appeared midline and turbinates well reduced. bilateral middle meatuses were explored and suction and agitator were used to open ostiums and open any forming scar bands. all sinuses were explored and open at end of procedure\par did not suction recon \par

## 2022-08-11 PROCEDURE — 96365 THER/PROPH/DIAG IV INF INIT: CPT

## 2022-08-11 PROCEDURE — C1889: CPT

## 2022-08-11 PROCEDURE — 70486 CT MAXILLOFACIAL W/O DYE: CPT

## 2022-08-11 PROCEDURE — 70450 CT HEAD/BRAIN W/O DYE: CPT

## 2022-08-11 PROCEDURE — 70553 MRI BRAIN STEM W/O & W/DYE: CPT

## 2022-08-11 PROCEDURE — 86769 SARS-COV-2 COVID-19 ANTIBODY: CPT

## 2022-08-11 PROCEDURE — 96368 THER/DIAG CONCURRENT INF: CPT

## 2022-08-11 PROCEDURE — 86850 RBC ANTIBODY SCREEN: CPT

## 2022-08-11 PROCEDURE — 83735 ASSAY OF MAGNESIUM: CPT

## 2022-08-11 PROCEDURE — 36415 COLL VENOUS BLD VENIPUNCTURE: CPT

## 2022-08-11 PROCEDURE — 93005 ELECTROCARDIOGRAM TRACING: CPT

## 2022-08-11 PROCEDURE — 96366 THER/PROPH/DIAG IV INF ADDON: CPT

## 2022-08-11 PROCEDURE — 74018 RADEX ABDOMEN 1 VIEW: CPT

## 2022-08-11 PROCEDURE — U0003: CPT

## 2022-08-11 PROCEDURE — U0005: CPT

## 2022-08-11 PROCEDURE — 70545 MR ANGIOGRAPHY HEAD W/DYE: CPT

## 2022-08-11 PROCEDURE — 86900 BLOOD TYPING SEROLOGIC ABO: CPT

## 2022-08-11 PROCEDURE — 87483 CNS DNA AMP PROBE TYPE 12-25: CPT

## 2022-08-11 PROCEDURE — 86335 IMMUNFIX E-PHORSIS/URINE/CSF: CPT

## 2022-08-11 PROCEDURE — 86901 BLOOD TYPING SEROLOGIC RH(D): CPT

## 2022-08-11 PROCEDURE — 84157 ASSAY OF PROTEIN OTHER: CPT

## 2022-08-11 PROCEDURE — 87070 CULTURE OTHR SPECIMN AEROBIC: CPT

## 2022-08-11 PROCEDURE — 71045 X-RAY EXAM CHEST 1 VIEW: CPT

## 2022-08-11 PROCEDURE — 87205 SMEAR GRAM STAIN: CPT

## 2022-08-11 PROCEDURE — 89051 BODY FLUID CELL COUNT: CPT

## 2022-08-11 PROCEDURE — 85610 PROTHROMBIN TIME: CPT

## 2022-08-11 PROCEDURE — 85027 COMPLETE CBC AUTOMATED: CPT

## 2022-08-11 PROCEDURE — 82945 GLUCOSE OTHER FLUID: CPT

## 2022-08-11 PROCEDURE — 85730 THROMBOPLASTIN TIME PARTIAL: CPT

## 2022-08-11 PROCEDURE — 84100 ASSAY OF PHOSPHORUS: CPT

## 2022-08-11 PROCEDURE — 80048 BASIC METABOLIC PNL TOTAL CA: CPT

## 2022-08-11 PROCEDURE — 99285 EMERGENCY DEPT VISIT HI MDM: CPT

## 2022-08-11 PROCEDURE — 97116 GAIT TRAINING THERAPY: CPT

## 2022-08-11 PROCEDURE — 86140 C-REACTIVE PROTEIN: CPT

## 2022-08-11 PROCEDURE — 80202 ASSAY OF VANCOMYCIN: CPT

## 2022-08-11 PROCEDURE — 80053 COMPREHEN METABOLIC PANEL: CPT

## 2022-08-11 PROCEDURE — G1004: CPT

## 2022-08-11 PROCEDURE — 85652 RBC SED RATE AUTOMATED: CPT

## 2022-08-11 PROCEDURE — 84703 CHORIONIC GONADOTROPIN ASSAY: CPT

## 2022-08-11 PROCEDURE — 70250 X-RAY EXAM OF SKULL: CPT

## 2022-08-11 PROCEDURE — 85025 COMPLETE CBC W/AUTO DIFF WBC: CPT

## 2022-08-11 PROCEDURE — 84702 CHORIONIC GONADOTROPIN TEST: CPT

## 2022-08-11 PROCEDURE — 87040 BLOOD CULTURE FOR BACTERIA: CPT

## 2022-08-11 PROCEDURE — 83605 ASSAY OF LACTIC ACID: CPT

## 2022-08-19 ENCOUNTER — OUTPATIENT (OUTPATIENT)
Dept: OUTPATIENT SERVICES | Facility: HOSPITAL | Age: 47
LOS: 1 days | End: 2022-08-19
Payer: MEDICAID

## 2022-08-19 ENCOUNTER — APPOINTMENT (OUTPATIENT)
Dept: CT IMAGING | Facility: CLINIC | Age: 47
End: 2022-08-19

## 2022-08-19 DIAGNOSIS — G93.2 BENIGN INTRACRANIAL HYPERTENSION: ICD-10-CM

## 2022-08-19 DIAGNOSIS — Z00.8 ENCOUNTER FOR OTHER GENERAL EXAMINATION: ICD-10-CM

## 2022-08-19 PROCEDURE — 70450 CT HEAD/BRAIN W/O DYE: CPT

## 2022-08-19 PROCEDURE — 70450 CT HEAD/BRAIN W/O DYE: CPT | Mod: 26

## 2022-08-23 ENCOUNTER — APPOINTMENT (OUTPATIENT)
Dept: OTOLARYNGOLOGY | Facility: CLINIC | Age: 47
End: 2022-08-23

## 2022-08-23 VITALS
DIASTOLIC BLOOD PRESSURE: 76 MMHG | HEART RATE: 91 BPM | SYSTOLIC BLOOD PRESSURE: 116 MMHG | HEIGHT: 70 IN | BODY MASS INDEX: 31.78 KG/M2 | WEIGHT: 222 LBS

## 2022-08-23 PROCEDURE — 31237 NSL/SINS NDSC SURG BX POLYPC: CPT | Mod: LT,58

## 2022-08-23 PROCEDURE — 99024 POSTOP FOLLOW-UP VISIT: CPT

## 2022-08-23 NOTE — HISTORY OF PRESENT ILLNESS
[de-identified] : 46 year old female with history of spontaneous left nostril CSF leak\par s/p Endoscopic endonasal repair of cerebrospinal fluid leak with skull base reconstruction, lumbar drain placement with use of neuronavigation 06/29/22\par After procedure - patient with elevated intra cranial pressure.\par s/p ventriculoperitoneal shunt placement 07/07/22\par Followed by Neuro Dr. Hanson - appointment on 7/21\par  [FreeTextEntry1] : using mupiricin daily, with sinus rinses 5x a day, no bleeding. No leaks thus far. Sometimes mouth breathing at night but overall feels breathing has improved. She feels that both eyes have started to occasionally feel heavy, she is unable to focus intermittently, when she rests the symptoms resolve.\par she is scheduled to see Dr. Hanson at the end of the month

## 2022-08-23 NOTE — ASSESSMENT
[FreeTextEntry1] : 46 y/o F with history of spontaneous left nostril CSF leak,  s/p Endoscopic endonasal repair of cerebrospinal fluid leak with skull base reconstruction, lumbar drain placement with use of neuronavigation 06/29/22\par no leak per hx or on exam today, did not suction recon \par The patient was debrided bilaterally with rigid suction as a result of nasal crusting. breathing much improved after debridement. Patient should continue to avoid nose blowing, straw use, heavy lifting or exercising, and should start a regimen of over the counter nasal saline spray for moisturization of the nasal cavities\par will follow up to assure no scarring and keep sinuses open\par - Use nasal irrigation at least twice a day\par - f/u 2-3 months\par

## 2022-08-23 NOTE — PROCEDURE
[FreeTextEntry6] : procedure - Left endoscopic nasal  debridement\par Bilateral nasal cavities inspected with #0 ridged sinus endoscope. septum appeared midline and turbinates well reduced. bilateral middle meatuses were explored and suction and agitator were used to open ostiums and open any forming scar bands. all sinuses were explored and open at end of procedure\par did not suction recon\par

## 2022-08-23 NOTE — PHYSICAL EXAM
[Normal] : inferior turbinates and middle turbinates are normal [de-identified] : minimal nasal crusting

## 2022-08-23 NOTE — CONSULT LETTER
[Please see my note below.] : Please see my note below. [FreeTextEntry1] : Dear Dr. LEÓN AMAYA \par I had the pleasure of evaluating your patient SANDY PITTS, thank you for allowing us to participate in their care. please see full note detailing our visit below.\par If you have any questions, please do not hesitate to call me and I would be happy to discuss further. \par \par Gilbert Choi M.D.\par Attending Physician,  \par Department of Otolaryngology - Head and Neck Surgery\par [facility] \par Office: [phone]\par Fax: [phone]\par \par

## 2022-08-30 ENCOUNTER — APPOINTMENT (OUTPATIENT)
Dept: NEUROSURGERY | Facility: CLINIC | Age: 47
End: 2022-08-30

## 2022-08-30 VITALS
HEART RATE: 93 BPM | WEIGHT: 222 LBS | TEMPERATURE: 98.1 F | SYSTOLIC BLOOD PRESSURE: 120 MMHG | DIASTOLIC BLOOD PRESSURE: 85 MMHG | BODY MASS INDEX: 31.78 KG/M2 | OXYGEN SATURATION: 96 % | HEIGHT: 70 IN

## 2022-08-30 PROCEDURE — 99024 POSTOP FOLLOW-UP VISIT: CPT

## 2022-08-30 NOTE — PHYSICAL EXAM
[General Appearance - Alert] : alert [General Appearance - In No Acute Distress] : in no acute distress [General Appearance - Well Nourished] : well nourished [Oriented To Time, Place, And Person] : oriented to person, place, and time [Impaired Insight] : insight and judgment were intact [Affect] : the affect was normal [Person] : oriented to person [Place] : oriented to place [Time] : oriented to time [Motor Strength] : muscle strength was normal in all four extremities [Sensation Tactile Decrease] : light touch was intact [Sensation Pain / Temperature Decrease] : pain and temperature was intact [Balance] : balance was intact [Sclera] : the sclera and conjunctiva were normal [PERRL With Normal Accommodation] : pupils were equal in size, round, reactive to light, with normal accommodation [] : no respiratory distress [Abnormal Walk] : normal gait [Involuntary Movements] : no involuntary movements were seen [Motor Tone] : muscle strength and tone were normal [Coordination - Dysmetria Impaired Finger-to-Nose Bilateral] : not present

## 2022-08-30 NOTE — REASON FOR VISIT
[Other: _____] : [unfilled] [de-identified] : Right frontal ventriculoperitoneal shunt placement with Strata Regular 2 set and 1.5.  [de-identified] : 7/7/22

## 2022-08-30 NOTE — HISTORY OF PRESENT ILLNESS
[FreeTextEntry1] : SANDY PITTS is a 47 year old female who presents for post op neurosurgical evaluation of CSF leak repairn and VPS done on 7/7/22.\par To review: On 5/28/2022, she had a covid swab done prior to traveling to [place]. Approximately one hour later, she noticed clear drainage from left nare. She did not think much of it and landed in [place] on 5/31. The next day, she developed a fever which continued the following day. Between the continued fever and leakage left nare, she went to MD who suspected a CSF leak. He placed her on Augmentin for one week. 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 CT also demonstrated the leak and an ENT recommended surgery for repair. She decided to fly home to [place] and saw Dr. Adames for evaluation. He recommended she go to ED at [facility] on 6/20. She was placed on antibiotics for 8 days which she spent at hospital. She was then transferred to [facility] 6/28 for further care and went to the OR 6/28 for repair of CSF leak with Dr. Choi, ENT with Lumbar drain. 7/1 ct scan showed very small interhemispheric SDH. 7/5 drain was removed and was transferred back to [facility] on 7/5 under the care of Dr. Hanson due to continued headaches. She underwent placement of Strata valve VPS on 7/7 for intracranial hypertension valve set at 1.5. She was discharged to home on 7/10. \par \par Today, she presents with her daughter. VPS pumping and refilling adequately, set at 1.5. Patient denies headaches, dizziness, drainage from nares, or any other neurological symptoms. She states her vision is blurry and has an opthalmology appt next month. \par

## 2022-08-30 NOTE — DATA REVIEWED
[de-identified] : CT BRAIN - ORDERED BY: ALINE NOONAN\par \par \par PROCEDURE DATE: 08/19/2022\par \par \par \par INTERPRETATION: CLINICAL INDICATION: Intracranial hypertension,  shunt, follow-up\par \par 5mm axial sections of the brain were obtained from base to vertex, without the intravenous administration of contrast material. Coronal and sagittal computer generated reconstructed as are available.\par \par Comparison is made with the prior CT of 7/9/2022.\par \par There is a right-sided frontal wilfredo hole through which a  shunt catheter crosses to the left frontal horn, the tip is in the basal ganglia. The left lateral ventricle is slitlike. The right lateral ventricle. The largest is normal in size.\par \par There is no hemorrhage or mass. There is normal gray-white differentiation. Postoperative changes with opacification of the left nasal cavity the left sphenoid sinus and left maxillary sinus have predominantly cleared.\par \par \par \par IMPRESSION: No change in ventricle size since 7/9/2022. Progressive clearing of left-sided paranasal sinuses.

## 2022-08-30 NOTE — ASSESSMENT
[FreeTextEntry1] : Patient with above history and imaging. No neurological deficits. VPS checked and set at 1.5. Pt will f/u with opthalmology and then back in our office with report. \par \par Plan:\par Opthalmology appt\par f/u after above\par Patient knows to call the office if there are any new or worsening symptoms. \par All questions and concerns answered and addressed in detail to patient's complete satisfaction. Patient verbalized understanding and agreed to plan.\par \par

## 2022-08-31 ENCOUNTER — APPOINTMENT (OUTPATIENT)
Dept: OBGYN | Facility: CLINIC | Age: 47
End: 2022-08-31

## 2022-08-31 VITALS
DIASTOLIC BLOOD PRESSURE: 90 MMHG | HEIGHT: 70 IN | SYSTOLIC BLOOD PRESSURE: 135 MMHG | BODY MASS INDEX: 32.58 KG/M2 | WEIGHT: 227.56 LBS

## 2022-08-31 PROCEDURE — 99215 OFFICE O/P EST HI 40 MIN: CPT

## 2022-08-31 NOTE — PHYSICAL EXAM
[Chaperone Present] : A chaperone was present in the examining room during all aspects of the physical examination [FreeTextEntry1] : Celia [Appropriately responsive] : appropriately responsive [Alert] : alert [No Acute Distress] : no acute distress [No Lymphadenopathy] : no lymphadenopathy [Regular Rate Rhythm] : regular rate rhythm [No Murmurs] : no murmurs [Clear to Auscultation B/L] : clear to auscultation bilaterally [Soft] : soft [Non-tender] : non-tender [Non-distended] : non-distended [No HSM] : No HSM [No Lesions] : no lesions [No Mass] : no mass [Oriented x3] : oriented x3 [Examination Of The Breasts] : a normal appearance [No Masses] : no breast masses were palpable [Labia Majora] : normal [Labia Minora] : normal [Normal] : normal [Enlarged ___ wks] : enlarged [unfilled] ~Uweeks [Uterine Adnexae] : normal

## 2022-09-07 LAB — CYTOLOGY CVX/VAG DOC THIN PREP: NORMAL

## 2022-09-26 ENCOUNTER — APPOINTMENT (OUTPATIENT)
Dept: NEUROLOGY | Facility: CLINIC | Age: 47
End: 2022-09-26

## 2022-09-27 NOTE — PATIENT PROFILE ADULT - FUNCTIONAL ASSESSMENT - BASIC MOBILITY 1.
No respiratory distress. No stridor, Lungs sounds clear with good aeration bilaterally. 4 = No assist / stand by assistance

## 2022-10-07 ENCOUNTER — APPOINTMENT (OUTPATIENT)
Dept: NEUROSURGERY | Facility: CLINIC | Age: 47
End: 2022-10-07

## 2022-10-07 VITALS
DIASTOLIC BLOOD PRESSURE: 77 MMHG | WEIGHT: 225 LBS | SYSTOLIC BLOOD PRESSURE: 126 MMHG | HEIGHT: 70 IN | TEMPERATURE: 97.8 F | OXYGEN SATURATION: 98 % | HEART RATE: 94 BPM | BODY MASS INDEX: 32.21 KG/M2

## 2022-10-07 PROCEDURE — 99214 OFFICE O/P EST MOD 30 MIN: CPT

## 2022-10-07 NOTE — PHYSICAL EXAM
[FreeTextEntry1] : Awake, alert, and oriented x 3. VSS. In no apparent distress.   Short and long term memory intact.  Speech is clear and appropriate.  Affect is normal.  Voice is strong.  Respirations easy and even.  Normal skin color and pigmentation.  The sclera and conjunctiva normal.  Ears, nose, and neck normal in appearance.  EOMI, no nystagmus, facial sensation intact symmetrically, face symmetrical, hearing intact bilaterally, tongue and palate midline, head turning and shoulder shrug symmetric and no tongue deviation with protrusion.  No pronator drift.   No past-pointing, no tremors noted, no dysdiadochokinesia, and finger to nose dysmetria was not present.  Romberg negative.  \par Right hand dominant.\par  shunt present.Set at 1.5.  Depresses readily and refills readily.  Incision healed nicely. \par Rises from a seated position in a comfortable fashion.\par \par Gait is well coordinated and stable without the use of an assistive device. Motor strength in the upper extremities 5/5 in the biceps, triceps, and hand .  Motor strength in the lower extremities is 5/5 in the iliopsoas, quadriceps, and hamstrings.  \par \par
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
## 2022-10-31 ENCOUNTER — APPOINTMENT (OUTPATIENT)
Dept: OBGYN | Facility: CLINIC | Age: 47
End: 2022-10-31

## 2022-10-31 VITALS
HEIGHT: 70 IN | DIASTOLIC BLOOD PRESSURE: 86 MMHG | WEIGHT: 227 LBS | BODY MASS INDEX: 32.5 KG/M2 | SYSTOLIC BLOOD PRESSURE: 112 MMHG

## 2022-10-31 DIAGNOSIS — R10.2 PELVIC AND PERINEAL PAIN: ICD-10-CM

## 2022-10-31 DIAGNOSIS — Z86.018 PERSONAL HISTORY OF OTHER BENIGN NEOPLASM: ICD-10-CM

## 2022-10-31 PROCEDURE — 99213 OFFICE O/P EST LOW 20 MIN: CPT

## 2022-10-31 NOTE — PHYSICAL EXAM
[Chaperone Present] : A chaperone was present in the examining room during all aspects of the physical examination [Normal] : uterus [No Bleeding] : there was no active vaginal bleeding [Tenderness] : tender [Enlarged ___ wks] : enlarged [unfilled] ~Uweeks [Uterine Adnexae] : were not tender and not enlarged

## 2022-11-01 ENCOUNTER — APPOINTMENT (OUTPATIENT)
Dept: ULTRASOUND IMAGING | Facility: CLINIC | Age: 47
End: 2022-11-01

## 2022-11-01 ENCOUNTER — OUTPATIENT (OUTPATIENT)
Dept: OUTPATIENT SERVICES | Facility: HOSPITAL | Age: 47
LOS: 1 days | End: 2022-11-01
Payer: MEDICAID

## 2022-11-01 DIAGNOSIS — R10.2 PELVIC AND PERINEAL PAIN: ICD-10-CM

## 2022-11-01 DIAGNOSIS — D25.9 LEIOMYOMA OF UTERUS, UNSPECIFIED: ICD-10-CM

## 2022-11-01 DIAGNOSIS — Z00.8 ENCOUNTER FOR OTHER GENERAL EXAMINATION: ICD-10-CM

## 2022-11-01 PROCEDURE — 76856 US EXAM PELVIC COMPLETE: CPT | Mod: 26

## 2022-11-01 PROCEDURE — 76830 TRANSVAGINAL US NON-OB: CPT | Mod: 26

## 2022-11-01 PROCEDURE — 76856 US EXAM PELVIC COMPLETE: CPT

## 2022-11-01 PROCEDURE — 76830 TRANSVAGINAL US NON-OB: CPT

## 2022-11-15 ENCOUNTER — APPOINTMENT (OUTPATIENT)
Dept: OTOLARYNGOLOGY | Facility: CLINIC | Age: 47
End: 2022-11-15

## 2022-11-15 PROCEDURE — 31231 NASAL ENDOSCOPY DX: CPT

## 2022-11-15 PROCEDURE — 99213 OFFICE O/P EST LOW 20 MIN: CPT | Mod: 25

## 2022-11-15 NOTE — ASSESSMENT
[FreeTextEntry1] : 48 y/o F with history of spontaneous left nostril CSF leak,  s/p Endoscopic endonasal repair of cerebrospinal fluid leak with skull base reconstruction, lumbar drain placement with use of neuronavigation 06/29/22. Pt with occasional sinus pressure and nasal congestion at night. On exam, pt is healing very well. Overall, pt is doing very well and no need to debrid in office today. \par \par

## 2022-11-15 NOTE — REASON FOR VISIT
[Subsequent Evaluation] : a subsequent evaluation for [FreeTextEntry2] : spontaneous left nostril CSF leak

## 2022-11-15 NOTE — HISTORY OF PRESENT ILLNESS
[de-identified] : 46 year old female with history of spontaneous left nostril CSF leak\par s/p Endoscopic endonasal repair of cerebrospinal fluid leak with skull base reconstruction, lumbar drain placement with use of neuronavigation 06/29/22 after procedure - patient with elevated intra cranial pressure. s/p ventriculoperitoneal shunt placement 07/07/22\par States has been doing well, still follow up with Neurologist Dr. Hanson.\par Reports occasional sinus pressure and nasal congestion at night time.\par Daughter states that there is intermittent blurry vision.\par Seen by opthalmology - nerves all wnl.\par Continues saline irrigation 1-2 times daily.\par Denies nasal drainage and bleeding.

## 2022-11-15 NOTE — PROCEDURE
[FreeTextEntry6] : Procedure performed: Nasal Endoscopy- Diagnostic\par Pre-op indication(s): nasal congestion\par Post-op indication(s): nasal congestion \par Verbal and/or written consent obtained from patient\par Anterior rhinoscopy insufficient to account for symptoms\par Scope #: 3,  flexible fiber optic telescope \par The scope was introduced in the nasal passage between the middle and inferior turbinates to exam the inferior portion of the middle meatus and the fontanelle, as well as the maxillary ostia.  Next, the scope was passed medically and posteriorly to the middle turbinates to examine the sphenoethmoid recess and the superior turbinate region.\par Upon visualization the finders are as follows:\par Nasal Septum: sigmoidal septal deviation\par Right  Mucosa: boggy turbinates, Mucous: scant, Polyp: not seen, Inferior Turbinate: boggy, Middle Turbinate: normal, Superior Turbinate: normal, Inferior Meatus: narrow, Middle Meatus: narrow, Super Meatus:normal, Sphenoethmoidal Recess: clear\par left - middle turbinate gone, skull base open and clear, open adjacent sinuses, no evidence leak or debris\par

## 2022-11-15 NOTE — CONSULT LETTER
[Please see my note below.] : Please see my note below. [FreeTextEntry1] : Dear Dr. LEÓN AMAYA \par I had the pleasure of evaluating your patient SANDY PITTS, thank you for allowing us to participate in their care. please see full note detailing our visit below.\par If you have any questions, please do not hesitate to call me and I would be happy to discuss further. \par \par Gilbert Choi M.D.\par Attending Physician,  \par Department of Otolaryngology - Head and Neck Surgery\par [facility] \par Office: [phone]\par Fax: [phone]\par \par

## 2022-11-29 ENCOUNTER — APPOINTMENT (OUTPATIENT)
Dept: NEUROSURGERY | Facility: CLINIC | Age: 47
End: 2022-11-29
Payer: MEDICAID

## 2022-11-29 ENCOUNTER — OUTPATIENT (OUTPATIENT)
Dept: OUTPATIENT SERVICES | Facility: HOSPITAL | Age: 47
LOS: 1 days | End: 2022-11-29

## 2022-11-29 ENCOUNTER — APPOINTMENT (OUTPATIENT)
Dept: MRI IMAGING | Facility: CLINIC | Age: 47
End: 2022-11-29

## 2022-11-29 VITALS
WEIGHT: 220 LBS | HEIGHT: 71 IN | HEART RATE: 87 BPM | DIASTOLIC BLOOD PRESSURE: 79 MMHG | BODY MASS INDEX: 30.8 KG/M2 | TEMPERATURE: 97.6 F | SYSTOLIC BLOOD PRESSURE: 126 MMHG | OXYGEN SATURATION: 99 %

## 2022-11-29 DIAGNOSIS — Z00.8 ENCOUNTER FOR OTHER GENERAL EXAMINATION: ICD-10-CM

## 2022-11-29 PROCEDURE — 72197 MRI PELVIS W/O & W/DYE: CPT | Mod: 26

## 2022-11-29 PROCEDURE — 99213 OFFICE O/P EST LOW 20 MIN: CPT | Mod: 25

## 2022-11-29 PROCEDURE — 62252 CSF SHUNT REPROGRAM: CPT

## 2022-12-15 ENCOUNTER — NON-APPOINTMENT (OUTPATIENT)
Age: 47
End: 2022-12-15
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
## 2023-03-27 ENCOUNTER — APPOINTMENT (OUTPATIENT)
Dept: NEUROLOGY | Facility: CLINIC | Age: 48
End: 2023-03-27
Payer: MEDICAID

## 2023-03-27 VITALS
SYSTOLIC BLOOD PRESSURE: 120 MMHG | DIASTOLIC BLOOD PRESSURE: 78 MMHG | WEIGHT: 220 LBS | HEIGHT: 71 IN | BODY MASS INDEX: 30.8 KG/M2

## 2023-03-27 PROCEDURE — 99213 OFFICE O/P EST LOW 20 MIN: CPT

## 2023-03-27 NOTE — DATA REVIEWED
[de-identified] : Brain MRI was performed on 12/13/21.\par There is a partially empty sella.\par The left inferior frontal gyrus has a somewhat atypical appearance and a small encephalocele cannot fully be excluded.\par \par Brain MRI was repeated on 6/9/2022 in her home country of\par The study suggested the possibility of a CSF leak with dural fistula.

## 2023-03-27 NOTE — HISTORY OF PRESENT ILLNESS
[FreeTextEntry1] : I saw this patient in the office today.\par She presented with her daughter today.\par \par As you recall, the patient described headaches.\par This has been going on since the age of 11\par It waxes and wanes in intensity.\par \par She had been on amitriptyline for prophylaxis in the past. \par She reported that her headaches had subsided to about once per week.\par She then discontinued the amitriptyline.\par The headaches continue to subside and are now occurring only once every few months.\par They last a few days at a time.\par When severe it is associated with nausea and photophobia. \par \par 6/20/2022 visit:\par The patient was visiting [place] (her home country) and had a nasal swab PCR test for COVID for the trip home.\par This was on 5/28/2022.\par Immediately after the nasal swab she noticed clear nasal discharge.\par She was ultimately found to have a CSF leak.\par I had referred her to the hospital for neurosurgical evaluation.\par \par 3/27/2023 visit:\par She was ultimately transferred to [facility] in [place] for repair of the CSF leak and lumbar drain placement with ENT and neurosurgery.  She was then transferred back to [facility].  A  shunt was placed and the valve was set at 1.5.\par She now reports headaches on the order of once per week which mostly resolved with Tylenol.\par

## 2023-03-27 NOTE — ASSESSMENT
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 (versus normal anatomic variant) in the left inferior frontal region.\par \par She ultimately developed CSF leak after COVID swab.\par She is now status post repair and ultimate  shunt placement.\par \par She continues to have headaches on the order of once per week.\par I discussed the option of going back on amitriptyline for prophylaxis.\par I explained that she would have to take this on a daily basis.\par She prefers to stay with the Tylenol for the time being which she can take as needed.\par \par I will see her back here in 6 months.\par I advised her to call me sooner if the headache should worsen before that.\par

## 2023-04-24 ENCOUNTER — NON-APPOINTMENT (OUTPATIENT)
Age: 48
End: 2023-04-24

## 2023-08-01 NOTE — PROGRESS NOTE ADULT - SUBJECTIVE AND OBJECTIVE BOX
ENT ISSUE/POD: S/P CSF Leak Repair POD #1    HPI: 45 YO F with CSF leak from L ethmoid sinus S/P covid swab on 5/28 at [facility], over past several wks while traveling w/ increasing flow of rhinorrhea, eventually developed sxs cold and meningitis (HA/nausea/cough/gen weakness), adm [facility] on 6/20 febrile to 102, CSF PCR pos enterovirus, ID kept on cefepime. PT c/o salty taste. S/P Endonasal CSF Leak repair today. CSF leak in OR, repaired and LD placed @10cc/hr. Pt was evaluated at bedside. C/o frontal HA, 8/10 pain, relived with pain meds.  Denies salty/Metallic taste in mouth, fever/chills,  cough, N/V, facial pain, changes in vision.         PAST MEDICAL & SURGICAL HISTORY:  HLD (hyperlipidemia)      Chronic back pain        Allergies    No Known Allergies    Intolerances      MEDICATIONS  (STANDING):  amoxicillin  875 milliGRAM(s)/clavulanate 1 Tablet(s) Oral two times a day  atorvastatin 40 milliGRAM(s) Oral at bedtime  cefepime   IVPB 2000 milliGRAM(s) IV Intermittent every 8 hours  doxycycline monohydrate Capsule 100 milliGRAM(s) Oral every 12 hours  polyethylene glycol 3350 17 Gram(s) Oral daily  senna 1 Tablet(s) Oral two times a day  sodium chloride 0.65% Nasal 1 Spray(s) Both Nostrils three times a day  sodium chloride 0.9%. 1000 milliLiter(s) (75 mL/Hr) IV Continuous <Continuous>  topiramate 25 milliGRAM(s) Oral daily    MEDICATIONS  (PRN):  acetaminophen     Tablet .. 650 milliGRAM(s) Oral every 6 hours PRN Temp greater or equal to 38C (100.4F), Mild Pain (1 - 3)  bisacodyl Suppository 10 milliGRAM(s) Rectal daily PRN Constipation  cyclobenzaprine 10 milliGRAM(s) Oral three times a day PRN Muscle Spasm  HYDROmorphone  Injectable 1 milliGRAM(s) IV Push every 4 hours PRN Breakthrough pain  melatonin 5 milliGRAM(s) Oral at bedtime PRN Insomnia  ondansetron Injectable 4 milliGRAM(s) IV Push once PRN Nausea and/or Vomiting  oxyCODONE    IR 5 milliGRAM(s) Oral every 4 hours PRN Moderate Pain (4 - 6)  oxyCODONE    IR 10 milliGRAM(s) Oral every 4 hours PRN Severe Pain (7 - 10)  saline laxative (FLEET) Rectal Enema 1 Enema Rectal once PRN constipation      Social History: see consult    Family history: see consult    ROS:   ENT: all negative except as noted in HPI   Pulm: denies SOB, cough, hemoptysis  Neuro: denies numbness/tingling, loss of sensation  Endo: denies heat/cold intolerance, excessive sweating      Vital Signs Last 24 Hrs  T(C): 36.8 (30 Jun 2022 07:00), Max: 36.8 (29 Jun 2022 18:00)  T(F): 98.2 (30 Jun 2022 07:00), Max: 98.3 (29 Jun 2022 18:00)  HR: 89 (30 Jun 2022 07:00) (75 - 94)  BP: 134/80 (30 Jun 2022 07:00) (114/67 - 147/88)  BP(mean): 96 (30 Jun 2022 07:00) (81 - 105)  RR: 13 (30 Jun 2022 07:00) (12 - 23)  SpO2: 92% (30 Jun 2022 07:00) (91% - 100%)                          12.2   16.72 )-----------( 396      ( 29 Jun 2022 22:36 )             38.6    06-29    138  |  104  |  6<L>  ----------------------------<  123<H>  4.1   |  23  |  0.64    Ca    8.9      29 Jun 2022 22:36  Phos  3.5     06-29  Mg     1.9     06-29    TPro  7.9  /  Alb  4.0  /  TBili  0.4  /  DBili  x   /  AST  10  /  ALT  7<L>  /  AlkPhos  77  06-28   PT/INR - ( 28 Jun 2022 23:05 )   PT: 13.9 sec;   INR: 1.21 ratio         PTT - ( 28 Jun 2022 23:05 )  PTT:32.4 sec    PHYSICAL EXAM:  Gen: NAD, On Humidified O2.   Skin: No rashes, bruises, or lesions  Head: Normocephalic, Atraumatic  Face: no edema, erythema, or fluctuance. Parotid glands soft without mass  Eyes: no scleral injection  Nose: Crusted blood in Right Nare, blood tinged secretions in Left Nare.  No CSF leak noted on exam.   Mouth: No Stridor / Drooling / Trismus.  Mucosa moist, tongue/uvula midline, crusted blood in posterior oropharynx.  Neck: Flat, supple, no lymphadenopathy, trachea midline, no masses  Lymphatic: No lymphadenopathy  Resp: On Humidified O2.   Neuro: facial nerve intact, no facial droop.          Yes

## 2023-08-02 ENCOUNTER — APPOINTMENT (OUTPATIENT)
Dept: OBGYN | Facility: CLINIC | Age: 48
End: 2023-08-02
Payer: MEDICAID

## 2023-08-02 VITALS
WEIGHT: 241 LBS | BODY MASS INDEX: 33.74 KG/M2 | HEIGHT: 71 IN | DIASTOLIC BLOOD PRESSURE: 70 MMHG | SYSTOLIC BLOOD PRESSURE: 122 MMHG

## 2023-08-02 DIAGNOSIS — R10.2 PELVIC AND PERINEAL PAIN: ICD-10-CM

## 2023-08-02 DIAGNOSIS — N92.1 EXCESSIVE AND FREQUENT MENSTRUATION WITH IRREGULAR CYCLE: ICD-10-CM

## 2023-08-02 DIAGNOSIS — D25.9 LEIOMYOMA OF UTERUS, UNSPECIFIED: ICD-10-CM

## 2023-08-02 PROCEDURE — 99204 OFFICE O/P NEW MOD 45 MIN: CPT | Mod: 25

## 2023-08-02 PROCEDURE — 99386 PREV VISIT NEW AGE 40-64: CPT

## 2023-08-02 NOTE — DISCUSSION/SUMMARY
[FreeTextEntry1] : 48-year-old -0-1-2 presents with pelvic pain.  MRI showed fibroid uterus and physical exam shows fibroid uterus.  Pap GC chlamydia sent.  Return in 4 weeks for hysteroscopy endometrial sampling after which patient will decide whether to have myomectomy, or hysterectomy, or uterine artery embolization or nothing.

## 2023-08-02 NOTE — PHYSICAL EXAM
[Chaperone Present] : A chaperone was present in the examining room during all aspects of the physical examination [FreeTextEntry1] : Celia [Appropriately responsive] : appropriately responsive [Alert] : alert [No Acute Distress] : no acute distress [No Lymphadenopathy] : no lymphadenopathy [Regular Rate Rhythm] : regular rate rhythm [No Murmurs] : no murmurs [Clear to Auscultation B/L] : clear to auscultation bilaterally [Soft] : soft [Non-tender] : non-tender [Non-distended] : non-distended [No HSM] : No HSM [No Lesions] : no lesions [No Mass] : no mass [Oriented x3] : oriented x3 [Examination Of The Breasts] : a normal appearance [No Masses] : no breast masses were palpable [Labia Majora] : normal [Labia Minora] : normal [Normal] : normal [Tenderness] : tender [Enlarged ___ wks] : enlarged [unfilled] ~Uweeks [Uterine Adnexae] : normal

## 2023-08-02 NOTE — HISTORY OF PRESENT ILLNESS
[N] : Patient does not use contraception [Y] : Positive pregnancy history [Regular Cycle Intervals] : periods have been regular [Frequency: Q ___ days] : menstrual periods occur approximately every [unfilled] days [Menarche Age: ____] : age at menarche was [unfilled] [FreeTextEntry1] : 48-year-old -0-1-2 last menstrual cycle was 2023 for 6 days presents with worsening pelvic pain and menorrhagia.  Pelvic pain is actually better with menstrual cycle.  No discharge or itching.  MRI showed uterus measuring 9.8 x 6.7 x 9.0 cm and the fundal fibroid measuring 6.2 x 5.4 x 7.2 cm.  Subserosal fibroid measuring 2.6 cm. [PGHxTotal] : 3 [[facility] : 2 [PGHxPremature] : 0 [PGHxAbortions] : 1 [[facility]] : 2 [PGHxABInduced] : 0 [PGHxABSpont] : 1 [PGHxEctopic] : 0 [PGHxMultBirths] : 0

## 2023-08-07 LAB — CYTOLOGY CVX/VAG DOC THIN PREP: NORMAL

## 2023-09-22 NOTE — ED ADULT NURSE NOTE - ED CARDIAC CAPILLARY REFILL
2 seconds or less Klisyri Pregnancy And Lactation Text: It is unknown if this medication can harm a developing fetus or if it is excreted in breast milk.

## 2023-09-26 ENCOUNTER — APPOINTMENT (OUTPATIENT)
Dept: NEUROLOGY | Facility: CLINIC | Age: 48
End: 2023-09-26

## 2023-09-28 ENCOUNTER — APPOINTMENT (OUTPATIENT)
Dept: OBGYN | Facility: CLINIC | Age: 48
End: 2023-09-28

## 2023-10-17 ENCOUNTER — APPOINTMENT (OUTPATIENT)
Dept: OTOLARYNGOLOGY | Facility: CLINIC | Age: 48
End: 2023-10-17
Payer: MEDICAID

## 2023-10-17 VITALS
DIASTOLIC BLOOD PRESSURE: 68 MMHG | SYSTOLIC BLOOD PRESSURE: 116 MMHG | BODY MASS INDEX: 30.8 KG/M2 | HEART RATE: 85 BPM | OXYGEN SATURATION: 97 % | WEIGHT: 220 LBS | HEIGHT: 71 IN

## 2023-10-17 DIAGNOSIS — J34.89 OTHER SPECIFIED DISORDERS OF NOSE AND NASAL SINUSES: ICD-10-CM

## 2023-10-17 PROCEDURE — 99213 OFFICE O/P EST LOW 20 MIN: CPT | Mod: 25

## 2023-10-17 PROCEDURE — 31231 NASAL ENDOSCOPY DX: CPT

## 2023-11-01 NOTE — H&P ADULT - ASSESSMENT
46yF s/p Covid swab now experiencing csf leakage with temps. 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    Plan  1. admit ct of the brain with thin cuts through the skull base.   mri of the brain with fiesta ordered.   2. Pt placed on Rocephin and vanco due to temps   3. pre op for the   4 mri of the bran  5. Pt will need to be preop for exploratory cranio for csf leak and or placement of lumbar drain.   6. Pt case will be discussed with Dr Hanson, films have been reviewed with Dr Tony, signs muscle wasting, fat wasting

## 2023-12-29 ENCOUNTER — APPOINTMENT (OUTPATIENT)
Dept: NEUROSURGERY | Facility: CLINIC | Age: 48
End: 2023-12-29
Payer: MEDICAID

## 2023-12-29 VITALS
HEIGHT: 70 IN | TEMPERATURE: 98.8 F | OXYGEN SATURATION: 97 % | DIASTOLIC BLOOD PRESSURE: 75 MMHG | BODY MASS INDEX: 33.64 KG/M2 | WEIGHT: 235 LBS | HEART RATE: 78 BPM | SYSTOLIC BLOOD PRESSURE: 114 MMHG

## 2023-12-29 DIAGNOSIS — G96.01 CRANIAL CEREBROSPINAL FLUID LEAK, SPONTANEOUS: ICD-10-CM

## 2023-12-29 PROCEDURE — 99214 OFFICE O/P EST MOD 30 MIN: CPT

## 2023-12-29 NOTE — HISTORY OF PRESENT ILLNESS
Bedside and Verbal shift change report given to [facility] (oncoming nurse) by Jignesh Wylie (offgoing nurse). Report included the following information SBAR, Kardex, Intake/Output, MAR, Recent Results and Cardiac Rhythm nsr. 2015 [facility] Dr Joyce Jacome at bedside, order to decrease IVF to 50 mL, transfer pt to surgical floor 1313 pt and all belongings transferred to room 429, spouse at bedside [FreeTextEntry1] : SANDY PITTS is status post Right frontal ventriculoperitoneal shunt placement with Strata set at 1.5. Surgery Date: 7/7/22  TO REVIEW  CSF leak repair and VPS done on 7/7/22. On 5/28/2022, she had a covid swab done prior to traveling to [place]. Approximately one hour later, she noticed clear drainage from left nare. She did not think much of it and landed in [place] on 5/31. The next day, she developed a fever which continued the following day. Between the continued fever and leakage left nare, she went to [facility] who suspected a CSF leak. He placed her on Augmentin for one week. 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 CT also demonstrated the leak and an ENT recommended surgery for repair. She decided to fly home to [place] and saw Dr. Adames for evaluation. He recommended she go to ED at [facility] on 6/20. She was placed on antibiotics for 8 days which she spent at hospital. She was then transferred to [facility] 6/28 for further care and went to the OR 6/28 for repair of CSF leak with Dr. Choi, ENT with Lumbar drain. 7/1 ct scan showed very small interhemispheric SDH. 7/5 drain was removed and was transferred back to [facility] on 7/5 under the care of Dr. Hanson due to continued headaches. She underwent placement of Strata valve VPS on 7/7 for intracranial hypertension valve set at 1.5. She was discharged to home on 7/10.  Today, she presents with her daughter for follow up. She reports feeling of burning and waves of hot sensation throughout her body especially when she is near heat. She also reports weakness in bilateral upper extremities and feelings of impatience and agitation. Neuro exam intact. She denies headaches, dizziness, speech or vision disturbance, fluid leaking from nares or back of throat. Her vision has improved. She has no sense of smell.   Last ct in August was stable. I would like to obtain a ct scan, but patient states she is changing insurance and will perform it in February 2024. VPS pumping and refilling set at 1.5. Plan: Fu in Feb 2024 with non contrast brain ct with Dr. Wade .

## 2023-12-29 NOTE — PHYSICAL EXAM
[FreeTextEntry1] : Awake, alert, and oriented x 3. VSS. In no apparent distress.   Short and long term memory intact.  Speech is clear and appropriate.  Affect is normal.  Voice is strong.  Respirations easy and even.  Normal skin color and pigmentation.  The sclera and conjunctiva normal.  Ears, nose, and neck normal in appearance.  EOMI, no nystagmus, facial sensation intact symmetrically, face symmetrical, hearing intact bilaterally, tongue and palate midline, head turning and shoulder shrug symmetric and no tongue deviation with protrusion.  No pronator drift.   No past-pointing, no tremors noted, no dysdiadochokinesia, and finger to nose dysmetria was not present.      shunt present.  Depresses readily and refills readily.  set at 1.5 Rises from a seated position in a comfortable fashion.  Gait is well coordinated and stable without the use of an assistive device. Motor strength in the upper extremities 5/5 in the biceps, triceps, and hand .  Motor strength in the lower extremities is 5/5 in the iliopsoas, quadriceps, and hamstrings.

## 2024-02-26 ENCOUNTER — APPOINTMENT (OUTPATIENT)
Dept: GASTROENTEROLOGY | Facility: CLINIC | Age: 49
End: 2024-02-26
Payer: COMMERCIAL

## 2024-02-26 VITALS
SYSTOLIC BLOOD PRESSURE: 134 MMHG | TEMPERATURE: 97.7 F | HEART RATE: 93 BPM | OXYGEN SATURATION: 97 % | DIASTOLIC BLOOD PRESSURE: 92 MMHG | RESPIRATION RATE: 14 BRPM | BODY MASS INDEX: 34.93 KG/M2 | WEIGHT: 244 LBS | HEIGHT: 70 IN

## 2024-02-26 DIAGNOSIS — Z12.11 ENCOUNTER FOR SCREENING FOR MALIGNANT NEOPLASM OF COLON: ICD-10-CM

## 2024-02-26 PROCEDURE — 99204 OFFICE O/P NEW MOD 45 MIN: CPT

## 2024-02-26 RX ORDER — SUMATRIPTAN 100 MG/1
100 TABLET, FILM COATED ORAL
Qty: 9 | Refills: 2 | Status: DISCONTINUED | COMMUNITY
Start: 2021-11-23 | End: 2024-02-26

## 2024-02-26 RX ORDER — CYCLOBENZAPRINE HYDROCHLORIDE 10 MG/1
10 TABLET, FILM COATED ORAL
Qty: 30 | Refills: 0 | Status: DISCONTINUED | COMMUNITY
Start: 2022-05-23 | End: 2024-02-26

## 2024-02-26 RX ORDER — POLYETHYLENE GLYCOL-3350 AND ELECTROLYTES WITH FLAVOR PACK 240; 5.84; 2.98; 6.72; 22.72 G/278.26G; G/278.26G; G/278.26G; G/278.26G; G/278.26G
240 POWDER, FOR SOLUTION ORAL
Qty: 4000 | Refills: 0 | Status: ACTIVE | COMMUNITY
Start: 2024-02-26 | End: 1900-01-01

## 2024-02-26 RX ORDER — EZETIMIBE 10 MG/1
TABLET ORAL
Refills: 0 | Status: ACTIVE | COMMUNITY

## 2024-02-26 RX ORDER — MUPIROCIN 20 MG/G
2 OINTMENT TOPICAL
Qty: 1 | Refills: 0 | Status: DISCONTINUED | COMMUNITY
Start: 2022-08-01 | End: 2024-02-26

## 2024-02-26 RX ORDER — NORMAL SALT TABLETS 1 G/G
1 TABLET ORAL
Qty: 60 | Refills: 0 | Status: DISCONTINUED | COMMUNITY
Start: 2022-07-10 | End: 2024-02-26

## 2024-02-26 RX ORDER — IPRATROPIUM BROMIDE 21 UG/1
0.03 SPRAY NASAL 3 TIMES DAILY
Qty: 1 | Refills: 3 | Status: DISCONTINUED | COMMUNITY
Start: 2023-10-17 | End: 2024-02-26

## 2024-02-26 RX ORDER — NITROFURANTOIN (MONOHYDRATE/MACROCRYSTALS) 25; 75 MG/1; MG/1
100 CAPSULE ORAL
Qty: 6 | Refills: 0 | Status: DISCONTINUED | COMMUNITY
Start: 2022-03-28 | End: 2024-02-26

## 2024-02-26 NOTE — ASSESSMENT
[FreeTextEntry1] : The patient is at average risk for colorectal cancer. The bowel preparation was discussed at length. Risks (including bleeding, pain, perforation, incomplete examination, splenic laceration, adverse reactions to medications, aspiration and death), benefits and alternatives were discussed. Patient is agreeable for the colonoscopy. The patient is medically optimized for the procedure. We will schedule the patient for the procedure. Bowel preparation was sent to the pharmacy.  I have discussed with the anesthesiology team regarding the  shunt.  Also recommended to the patient to discuss with her neurosurgeon.  Bo Solis MD Gastroenterology

## 2024-02-26 NOTE — PHYSICAL EXAM
[Alert] : alert [Normal Voice/Communication] : normal voice/communication [Healthy Appearing] : healthy appearing [No Acute Distress] : no acute distress [Sclera] : the sclera and conjunctiva were normal [Hearing Threshold Finger Rub Not [place]] : hearing was normal [Normal Lips/Gums] : the lips and gums were normal [Oropharynx] : the oropharynx was normal [Normal Appearance] : the appearance of the neck was normal [No Neck Mass] : no neck mass was observed [No Respiratory Distress] : no respiratory distress [No Acc Muscle Use] : no accessory muscle use [Respiration, Rhythm And Depth] : normal respiratory rhythm and effort [Auscultation Breath Sounds / Voice Sounds] : lungs were clear to auscultation bilaterally [Heart Rate And Rhythm] : heart rate was normal and rhythm regular [Normal S1, S2] : normal S1 and S2 [Murmurs] : no murmurs [Bowel Sounds] : normal bowel sounds [Abdomen Tenderness] : non-tender [No Masses] : no abdominal mass palpated [] : no hepatosplenomegaly [Abdomen Soft] : soft [Oriented To Time, Place, And Person] : oriented to person, place, and time

## 2024-02-26 NOTE — HISTORY OF PRESENT ILLNESS
[FreeTextEntry1] : Patient  arrived for consultation for screening colonoscopy.  She has history of CSF leak requiring endoscopy closure but then subsequently requiring  shunt for increased intracranial pressure.  She has been doing okay.  No GI complaints.  No previous colonoscopy.  No family history of colorectal cancer.  No rectal bleeding.

## 2024-03-02 ENCOUNTER — APPOINTMENT (OUTPATIENT)
Dept: CT IMAGING | Facility: CLINIC | Age: 49
End: 2024-03-02

## 2024-03-02 ENCOUNTER — OUTPATIENT (OUTPATIENT)
Dept: OUTPATIENT SERVICES | Facility: HOSPITAL | Age: 49
LOS: 1 days | End: 2024-03-02
Payer: COMMERCIAL

## 2024-03-02 DIAGNOSIS — Z98.2 PRESENCE OF CEREBROSPINAL FLUID DRAINAGE DEVICE: ICD-10-CM

## 2024-03-02 PROCEDURE — 70450 CT HEAD/BRAIN W/O DYE: CPT | Mod: 26

## 2024-03-04 ENCOUNTER — NON-APPOINTMENT (OUTPATIENT)
Age: 49
End: 2024-03-04

## 2024-03-06 ENCOUNTER — RESULT REVIEW (OUTPATIENT)
Age: 49
End: 2024-03-06

## 2024-03-06 ENCOUNTER — APPOINTMENT (OUTPATIENT)
Dept: NEUROSURGERY | Facility: CLINIC | Age: 49
End: 2024-03-06
Payer: COMMERCIAL

## 2024-03-06 VITALS
OXYGEN SATURATION: 97 % | HEIGHT: 70 IN | DIASTOLIC BLOOD PRESSURE: 80 MMHG | WEIGHT: 240 LBS | BODY MASS INDEX: 34.36 KG/M2 | HEART RATE: 83 BPM | TEMPERATURE: 98.1 F | SYSTOLIC BLOOD PRESSURE: 139 MMHG

## 2024-03-06 PROCEDURE — 99214 OFFICE O/P EST MOD 30 MIN: CPT

## 2024-03-06 NOTE — HISTORY OF PRESENT ILLNESS
[FreeTextEntry1] : SANDY PITTS is status post Right frontal ventriculoperitoneal shunt placement with Strata set at 1.5. Surgery Date: 7/7/22  TO REVIEW  CSF leak repair and VPS done on 7/7/22. On 5/28/2022, she had a covid swab done prior to traveling to [place]. Approximately one hour later, she noticed clear drainage from left nare. She did not think much of it and landed in [place] on 5/31. The next day, she developed a fever which continued the following day. Between the continued fever and leakage left nare, she went to [facility] who suspected a CSF leak. He placed her on Augmentin for one week. 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 CT also demonstrated the leak and an ENT recommended surgery for repair. She decided to fly home to [place] and saw Dr. Adames for evaluation. He recommended she go to ED at [facility] on 6/20. She was placed on antibiotics for 8 days which she spent at hospital. She was then transferred to [facility] 6/28 for further care and went to the OR 6/28 for repair of CSF leak with Dr. Choi, ENT with Lumbar drain. 7/1 ct scan showed very small interhemispheric SDH. 7/5 drain was removed and was transferred back to [facility] on 7/5 under the care of Dr. Hanson due to continued headaches. She underwent placement of Strata valve VPS on 7/7 for intracranial hypertension valve set at 1.5. She was discharged to home on 7/10.  Today, she presents with her daughter for follow up. She reports feeling of burning and waves of hot sensation throughout her body especially when she is near heat. She also reports weakness in bilateral upper extremities and feelings of impatience and agitation. Neuro exam intact. She denies headaches, dizziness, speech or vision disturbance, fluid leaking from nares or back of throat. Her vision has improved. She has no sense of smell.   VPS pumping and refilling set at 1.5.

## 2024-03-06 NOTE — END OF VISIT
[Time Spent: ___ minutes] : I have spent [unfilled] minutes of time on the encounter. [FreeTextEntry3] : Ms. Garcia has a history of a CSF leak status post repair status post ventriculoperitoneal shunting.  She presents to the office today after she saw Susie Chua a few months ago complaining of occasional headaches and blurry vision.  She primarily complains of heat sensitivity to the side of the shunt mainly on the skin of the head and neck with associated itchiness.  I inspected the site and it does not appear to to show signs of infection.  Additionally the shunt reservoir pumps and refills briskly.  Her latest CT of the head demonstrates small ventricular size.  We will obtain a MRI of the brain with and without contrast as a long-term follow-up study of her CSF leak.  Additionally we will obtain a shunt series to evaluate the shunt tubing.  I feel her symptoms are most likely related to normal granulomatous inflammation or calcification of the shunt tubing.  I will see her in the office after imaging is complete.

## 2024-03-06 NOTE — ASSESSMENT
[FreeTextEntry1] : Ms. Garcia presents with history and imaging.  She is neurologically intact at today's visit.  She has mild complaints of right-sided pressure and temperature changes but overall she is doing well from the shunt perspective.  She I have personally reviewed her CAT scan of the head that does not reveal any concern for subdural hematoma or changes in the ventricular size  Plan: MRI brain w/wo contrast f/u on CSF leak given complaints Xray of skull and shunt series to assess patency of shunt Patient has been given an opportunity to ask and have their questions answered to their satisfaction. Patient knows to call the office if there are any new or worsening symptoms.     I, Dr. Americo Wade, personally performed the evaluation and management (E/M) services for this established patient who presents today. That E/M includes conducting the clinically appropriate interval history &/or exam and establishing a continued plan of care. Today, my CHAVEZ, Moira Mazariegos, was here to observe my evaluation and management service for this patient and follow the plan of care established by me going forward.

## 2024-03-06 NOTE — DATA REVIEWED
[de-identified] : EXAM: [id number] - CT BRAIN - ORDERED BY: IWLLIAM HARMON   PROCEDURE DATE: 03/02/2024    INTERPRETATION: Clinical indications:  shunt surveillance.  Multiple axial sections were performed from the base of skull to vertex without contrast enhancement. Coronal and sagittal reconstructions were performed  This exam is compared prior head CT performed on August 19, 2022.  High right frontal wilfredo hole and shunt catheter is again seen unchanged in position  Asymmetry of the lateral ventricles are again seen and unchanged. The right lateral ventricle is slightly more prominent than the the left. No evidence of hydrocephalus is seen.  Small amount of extra axial fluid is again seen involving the posterior fossa region appears unchanged  No acute hemorrhage mass or mass effect is seen  Evaluation of the osseous structures with the appropriate window appears unremarkable  There is evidence of possible polyp again seen involving the posterior aspect of the left nasal cavity. Direct visualization is recommended.  The visualized paranasal sinuses demonstrate postop changes involving left maxillary sinus.  Both mastoid and middle ear regions appear clear.  IMPRESSION: Stable exam.  --- End of Report ---

## 2024-03-18 ENCOUNTER — OUTPATIENT (OUTPATIENT)
Dept: OUTPATIENT SERVICES | Facility: HOSPITAL | Age: 49
LOS: 1 days | End: 2024-03-18

## 2024-03-18 ENCOUNTER — APPOINTMENT (OUTPATIENT)
Dept: MRI IMAGING | Facility: CLINIC | Age: 49
End: 2024-03-18
Payer: COMMERCIAL

## 2024-03-18 DIAGNOSIS — Z00.8 ENCOUNTER FOR OTHER GENERAL EXAMINATION: ICD-10-CM

## 2024-03-18 PROCEDURE — 74018 RADEX ABDOMEN 1 VIEW: CPT | Mod: 26

## 2024-03-18 PROCEDURE — 70553 MRI BRAIN STEM W/O & W/DYE: CPT | Mod: 26

## 2024-03-18 PROCEDURE — 70250 X-RAY EXAM OF SKULL: CPT | Mod: 26

## 2024-03-18 PROCEDURE — 71045 X-RAY EXAM CHEST 1 VIEW: CPT | Mod: 26

## 2024-03-19 ENCOUNTER — NON-APPOINTMENT (OUTPATIENT)
Age: 49
End: 2024-03-19

## 2024-03-19 ENCOUNTER — APPOINTMENT (OUTPATIENT)
Dept: NEUROSURGERY | Facility: CLINIC | Age: 49
End: 2024-03-19
Payer: COMMERCIAL

## 2024-03-19 DIAGNOSIS — Z98.2 PRESENCE OF CEREBROSPINAL FLUID DRAINAGE DEVICE: ICD-10-CM

## 2024-03-19 PROCEDURE — 99212 OFFICE O/P EST SF 10 MIN: CPT | Mod: 25

## 2024-03-19 PROCEDURE — 62252 CSF SHUNT REPROGRAM: CPT

## 2024-03-21 ENCOUNTER — NON-APPOINTMENT (OUTPATIENT)
Age: 49
End: 2024-03-21

## 2024-03-27 ENCOUNTER — APPOINTMENT (OUTPATIENT)
Dept: OBGYN | Facility: CLINIC | Age: 49
End: 2024-03-27

## 2024-04-11 ENCOUNTER — APPOINTMENT (OUTPATIENT)
Dept: NEUROSURGERY | Facility: CLINIC | Age: 49
End: 2024-04-11

## 2024-05-09 ENCOUNTER — APPOINTMENT (OUTPATIENT)
Dept: NEUROSURGERY | Facility: CLINIC | Age: 49
End: 2024-05-09
Payer: COMMERCIAL

## 2024-05-09 VITALS
TEMPERATURE: 98.3 F | OXYGEN SATURATION: 96 % | HEIGHT: 70 IN | DIASTOLIC BLOOD PRESSURE: 62 MMHG | BODY MASS INDEX: 35.07 KG/M2 | SYSTOLIC BLOOD PRESSURE: 103 MMHG | HEART RATE: 71 BPM | WEIGHT: 245 LBS

## 2024-05-09 PROCEDURE — 99214 OFFICE O/P EST MOD 30 MIN: CPT

## 2024-05-09 RX ORDER — CAMPHOR 0.45 %
25 GEL (GRAM) TOPICAL EVERY 6 HOURS
Qty: 56 | Refills: 0 | Status: ACTIVE | COMMUNITY
Start: 2024-05-09 | End: 1900-01-01

## 2024-05-09 RX ORDER — CAMPHOR 0.45 %
2-0.1 GEL (GRAM) TOPICAL 4 TIMES DAILY
Qty: 1 | Refills: 0 | Status: ACTIVE | COMMUNITY
Start: 2024-05-09 | End: 1900-01-01

## 2024-05-09 NOTE — PHYSICAL EXAM
[FreeTextEntry1] : Constitutional: NAD Neuro * Mental Status:  GCS 15: Awake, alert, oriented to conversation. No aphasia or difficulty speaking. No dysarthria. * Cranial Nerves: Cnii-Cnxii grossly intact. EOMI, tongue midline, no gaze deviation, no facial droop, shoulder shrug intact * Motor: b/l UE and LE intact * Sensory: Sensation intact to light touch * Reflexes: not assessed Cardiovascular: Regular rate and rhythm. Eyes: See neurologic examination with detailed examination of eyes. Respiratory: non labored breathing Musculoskeletal: No muscle wasting noted Skin: grossly intact  Shunt: reset to 1.5

## 2024-05-09 NOTE — ASSESSMENT
[FreeTextEntry1] : 48F with PMH CSF leak s/p VPS 7/2022 who presents for follow up of recent MRI w/wo which read no changes since 3/2023, and shunt series read as no kinking and appears intact.  Plan: - Benadryl cream and PO for itching - Medrol dose pack - Follow up in 4 weeks to see response to treatment  - Patient and family in agreement with plan

## 2024-05-09 NOTE — HISTORY OF PRESENT ILLNESS
[FreeTextEntry1] : SANDY PITTS is status post Right frontal ventriculoperitoneal shunt placement with Strata set at 1.5. Surgery Date: 7/7/22  TO REVIEW CSF leak repair and VPS done on 7/7/22. On 5/28/2022, she had a covid swab done prior to traveling to [place]. Approximately one hour later, she noticed clear drainage from left nare. She did not think much of it and landed in [place] on 5/31. The next day, she developed a fever which continued the following day. Between the continued fever and leakage left nare, she went to [facility] who suspected a CSF leak. He placed her on Augmentin for one week. 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 CT also demonstrated the leak and an ENT recommended surgery for repair. She decided to fly home to [place] and saw Dr. Adames for evaluation. He recommended she go to ED at [facility] on 6/20. She was placed on antibiotics for 8 days which she spent at hospital. She was then transferred to [facility] 6/28 for further care and went to the OR 6/28 for repair of CSF leak with Dr. Choi, ENT with Lumbar drain. 7/1 ct scan showed very small interhemispheric SDH. 7/5 drain was removed and was transferred back to [facility] on 7/5 under the care of Dr. Hanson due to continued headaches. She underwent placement of Strata valve VPS on 7/7 for intracranial hypertension valve set at 1.5. She was discharged to home on 7/10.  She presented March 2024 with complaints of burning and waves of hot sensation throughout her body along with weakness in bilateral upper extremities and feelings of impatience and agitation. Her CTH was stable. She underwent repeat MRI w/wo which was read as "mild gliosis left inferior frontal gyrus, o abnormal parenchymal or leptomeningeal enhancement, small ventricles,  shunt." She presents today for follow up of MRI results. She admits to continued head itchiness which travels to her right neck, but no other new symptoms.

## 2024-05-09 NOTE — END OF VISIT
[FreeTextEntry3] : Ms. Garcia underwent an MRI and a shunt series.  I personally reviewed the images and she has no concerning findings.  She still complains of itchiness and burning at the incision in her head and along the shunt tract in her neck.  We will try Benadryl cream and a Medrol Dosepak and she will see me in the office in 4 weeks. [Time Spent: ___ minutes] : I have spent [unfilled] minutes of time on the encounter.

## 2024-05-10 ENCOUNTER — OFFICE (OUTPATIENT)
Dept: URBAN - METROPOLITAN AREA CLINIC 115 | Facility: CLINIC | Age: 49
Setting detail: OPHTHALMOLOGY
End: 2024-05-10
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
## 2024-05-10 PROCEDURE — MDRCRDRCRD MEDICAL RECORDS: Performed by: OPHTHALMOLOGY

## 2024-05-23 ENCOUNTER — APPOINTMENT (OUTPATIENT)
Dept: NEUROLOGY | Facility: CLINIC | Age: 49
End: 2024-05-23
Payer: COMMERCIAL

## 2024-05-23 VITALS
WEIGHT: 245 LBS | DIASTOLIC BLOOD PRESSURE: 78 MMHG | BODY MASS INDEX: 35.07 KG/M2 | SYSTOLIC BLOOD PRESSURE: 116 MMHG | HEIGHT: 70 IN

## 2024-05-23 DIAGNOSIS — G44.89 OTHER HEADACHE SYNDROME: ICD-10-CM

## 2024-05-23 PROCEDURE — G2211 COMPLEX E/M VISIT ADD ON: CPT

## 2024-05-23 PROCEDURE — 99213 OFFICE O/P EST LOW 20 MIN: CPT

## 2024-05-23 RX ORDER — METHYLPREDNISOLONE 4 MG/1
4 TABLET ORAL
Qty: 1 | Refills: 0 | Status: COMPLETED | COMMUNITY
Start: 2024-05-09 | End: 2024-05-23

## 2024-05-23 RX ORDER — NABUMETONE 500 MG/1
500 TABLET, FILM COATED ORAL
Qty: 60 | Refills: 2 | Status: ACTIVE | COMMUNITY
Start: 2024-05-23 | End: 1900-01-01

## 2024-05-23 NOTE — DATA REVIEWED
[de-identified] : Brain MRI was performed on 12/13/21. There is a partially empty sella. The left inferior frontal gyrus has a somewhat atypical appearance and a small encephalocele cannot fully be excluded.  Brain MRI was repeated on 6/9/2022 in her [place] country [place] The study suggested the possibility of a CSF leak with dural fistula.  Brain MRI was repeated on 3/18/2024. The shunt is in place.  There is some mild gliosis in the left inferior frontal gyrus. There has been no change since prior study.

## 2024-05-23 NOTE — HISTORY OF PRESENT ILLNESS
[FreeTextEntry1] : I saw this patient in the office today. She presented with her daughter today.  As you recall, the patient described headaches. This has been going on since the age of 11 It waxes and wanes in intensity.  She had been on amitriptyline for prophylaxis in the past.  She reported that her headaches had subsided to about once per week. She then discontinued the amitriptyline. The headaches continue to subside and are now occurring only once every few months. They last a few days at a time. When severe it is associated with nausea and photophobia.   6/20/2022 visit: The patient was visiting [place] (her home country) and had a nasal swab PCR test for COVID for the trip home. This was on 5/28/2022. Immediately after the nasal swab she noticed clear nasal discharge. She was ultimately found to have a CSF leak. I had referred her to the hospital for neurosurgical evaluation.  3/27/2023 visit: She was ultimately transferred to [facility] in [place] for repair of the CSF leak and lumbar drain placement with ENT and neurosurgery.  She was then transferred back to [facility].  A  shunt was placed and the valve was set at 1.5. She now reports headaches on the order of once per week which mostly resolved with Tylenol.  5/23/2024 visit:

## 2024-05-23 NOTE — ASSESSMENT
[FreeTextEntry1] : This is a 48-year-old woman with what sounds like episodic migraine. MRI showed possible encephalocele (versus normal anatomic variant) in the left inferior frontal region.  She ultimately developed CSF leak after COVID swab. She is now status post repair and ultimate  shunt placement.  She continues to have headaches on the order of once per week. I discussed the option of going back on amitriptyline for prophylaxis. She does not wish to be on daily preventive medication at present. I have suggested a trial of nabumetone to be used as needed.  I will see her back here in 6 months. I advised her to call me sooner if the headache should worsen before that.

## 2024-05-23 NOTE — PHYSICAL EXAM
[General Appearance - Alert] : alert [General Appearance - In No Acute Distress] : in no acute distress [Oriented To Time, Place, And Person] : oriented to person, place, and time [Affect] : the affect was normal [Memory Recent] : recent memory was not impaired [Memory Remote] : remote memory was not impaired [Dysarthria] : no dysarthria [Aphasia] : no dysphasia/aphasia [Cranial Nerves Optic (II)] : visual acuity intact bilaterally,  visual fields full to confrontation, pupils equal round and reactive to light [Cranial Nerves Oculomotor (III)] : extraocular motion intact [Cranial Nerves Trigeminal (V)] : facial sensation intact symmetrically [Cranial Nerves Facial (VII)] : face symmetrical [Cranial Nerves Vestibulocochlear (VIII)] : hearing was intact bilaterally [Cranial Nerves Glossopharyngeal (IX)] : tongue and palate midline [Cranial Nerves Accessory (XI - Cranial And Spinal)] : head turning and shoulder shrug symmetric [Cranial Nerves Hypoglossal (XII)] : there was no tongue deviation with protrusion [Motor Tone] : muscle tone was normal in all four extremities [Motor Strength] : muscle strength was normal in all four extremities [Sensation Tactile Decrease] : light touch was intact [Sensation Pain / Temperature Decrease] : pain and temperature was intact [Sensation Vibration Decrease] : vibration was intact [Romberg's Sign] : Romberg's sign was negtive [Abnormal Walk] : normal gait [Coordination - Dysmetria Impaired Finger-to-Nose Bilateral] : not present [2+] : Patella left 2+ [Plantar Reflex Right Only] : normal on the right [Plantar Reflex Left Only] : normal on the left [Optic Disc Abnormality] : the optic disc were normal in size and color

## 2024-06-03 NOTE — PRE-OP CHECKLIST - SPO2 (%)
[facility]  part of [facility]    Progress Note    Ollie Hernández Patient Status:  Inpatient    1949 MRN [id number]   Location [facility] 5SW/SE Attending Wili Parmar MD   Hosp Day # 0 PCP Wili Parmar MD       Subjective:   Ollie Hernández is a(n) 75 year old male     ROS:     Constitutional: Feels good after procedure  ENMT:  Negative for ear drainage, hearing loss and nasal drainage  Eyes:  Negative for eye discharge and vision loss  Cardiovascular:  Negative for chest pain, sob, irregular heartbeat/palpitations  Respiratory:  Negative for cough, dyspnea and wheezing  Gastrointestinal:   Does not notice any GI bleeding  Genitourinary:  Negative for dysuria and hematuria  Endocrine:  Negative for abnormal sleep patterns, increased activity, polydipsia and polyphagia  Hema/Lymph:  Negative for easy bleeding and easy bruising  Integumentary:  Negative for pruritus and rash  Musculoskeletal:  Negative for bone/joint symptoms  Neurological:  Negative for gait disturbance  Psychiatric:  Negative for inappropriate interaction and psychiatric symptoms      Vitals:    24 1422   BP: 155/58   Pulse: 67   Resp: 18   Temp: 97.8 °F (36.6 °C)           PHYSICAL EXAM:   Constitutional: appears well hydrated alert and responsive no acute distress noted  Head/Face: normocephalic  Eyes/Vision: normal extraocular motion is intact  Nose/Mouth/Throat:mucous membranes are moist and no oral lesions are noted  Neck/Thyroid: neck is supple without adenopathy  Lymphatic: no abnormal cervical, supraclavicular adenopathy is noted  Respiratory:  lungs are clear to auscultation bilaterally, normal respiratory effort  Cardiovascular: regular rate and rhythm no murmurs, gallups, or rubs  Abdomen: soft, non-tender, non-distended, BS normal  Vascular: well perfused femoral, and pedal pulses normal  Skin/Hair: no unusual rashes present, no abnormal bruising noted  Back/Spine: no abnormalities  noted  Musculoskeletal: full ROM all extremities good strength  no deformities  Extremities: no edema, cyanosis  Neurological:  Grossly normal    Results:     Laboratory Data:  Lab Results   Component Value Date    WBC 5.3 06/03/2024    HGB 10.5 (L) 06/03/2024    HCT 32.1 (L) 06/03/2024    .0 06/03/2024    CREATSERUM 5.86 (H) 06/03/2024    BUN 40 (H) 06/03/2024     (L) 06/03/2024    K 3.8 06/03/2024    CL 98 06/03/2024    CO2 27.0 06/03/2024     (H) 06/03/2024    CA 9.5 06/03/2024    ALB 3.7 06/03/2024    ALKPHO 64 06/01/2024    BILT 0.4 06/01/2024    TP 6.7 06/01/2024    AST 25 06/01/2024    ALT 26 06/01/2024    T4F 0.9 08/31/2022    TSH 2.060 06/23/2023     (H) 07/25/2023    PSA 2.94 10/20/2021    ESRML 79 (H) 03/16/2016    CRP 0.36 03/16/2016    MG 2.1 11/20/2023    PHOS 5.0 06/03/2024    TROP <0.045 07/25/2019     08/05/2023    B12 631 08/05/2023       Imaging:  [unfilled]   No results found.      ASSESSMENT/PLAN:   Assessment       #1 ESRD proceed with dialysis    Post upper and lower endoscopy       #2GI bleeding normal esophagus mild gastritis had some small bleeding in the proximal transverse colon that was cauterized  Is on a PPI hemoglobin stable at 10.5  #3 hypertension BP mildly high see how it does after dialysis    Continue present plan Home soon  6/3/2024  José Callahan MD       98

## 2024-06-12 ENCOUNTER — NON-APPOINTMENT (OUTPATIENT)
Age: 49
End: 2024-06-12

## 2024-06-13 ENCOUNTER — APPOINTMENT (OUTPATIENT)
Dept: NEUROSURGERY | Facility: CLINIC | Age: 49
End: 2024-06-13
Payer: COMMERCIAL

## 2024-06-13 VITALS
TEMPERATURE: 98 F | WEIGHT: 240 LBS | BODY MASS INDEX: 34.36 KG/M2 | HEART RATE: 79 BPM | HEIGHT: 70 IN | DIASTOLIC BLOOD PRESSURE: 82 MMHG | SYSTOLIC BLOOD PRESSURE: 120 MMHG | OXYGEN SATURATION: 96 %

## 2024-06-13 DIAGNOSIS — L29.9 PRURITUS, UNSPECIFIED: ICD-10-CM

## 2024-06-13 DIAGNOSIS — G93.2 BENIGN INTRACRANIAL HYPERTENSION: ICD-10-CM

## 2024-06-13 PROCEDURE — 99213 OFFICE O/P EST LOW 20 MIN: CPT

## 2024-06-13 RX ORDER — DEXAMETHASONE 2 MG/1
2 TABLET ORAL
Qty: 47 | Refills: 0 | Status: ACTIVE | COMMUNITY
Start: 2024-06-13 | End: 1900-01-01

## 2024-06-14 NOTE — END OF VISIT
[FreeTextEntry3] : Ms. Garcia is feeling better after her short course of steroids.  She still has some mild itchiness along the tract and we will try another round of steroids for 2 weeks and I will see her in the office in 4 weeks after that. [Time Spent: ___ minutes] : I have spent [unfilled] minutes of time on the encounter.

## 2024-06-14 NOTE — HISTORY OF PRESENT ILLNESS
[FreeTextEntry1] : SANDY PITTS is status post Right frontal ventriculoperitoneal shunt placement with Strata set at 1.5. Surgery Date: 7/7/22  TO REVIEW CSF leak repair and VPS done on 7/7/22. On 5/28/2022, she had a covid swab done prior to traveling to [place]. Approximately one hour later, she noticed clear drainage from left nare. She did not think much of it and landed in [place] on 5/31. The next day, she developed a fever which continued the following day. Between the continued fever and leakage left nare, she went to MD who suspected a CSF leak. He placed her on Augmentin for one week. 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 CT also demonstrated the leak and an ENT recommended surgery for repair. She decided to fly home to [place] and saw Dr. Adames for evaluation. He recommended she go to ED at [facility] on 6/20. She was placed on antibiotics for 8 days which she spent at hospital. She was then transferred to [facility] 6/28 for further care and went to the OR 6/28 for repair of CSF leak with Dr. Choi, ENT with Lumbar drain. 7/1 ct scan showed very small interhemispheric SDH. 7/5 drain was removed and was transferred back to [facility] on 7/5 under the care of Dr. Hanson due to continued headaches. She underwent placement of Strata valve VPS on 7/7 for intracranial hypertension valve set at 1.5. She was discharged to home on 7/10.  She presented March 2024 with complaints of burning and waves of hot sensation throughout her body along with weakness in bilateral upper extremities and feelings of impatience and agitation. Her CTH was stable. She underwent repeat MRI w/wo which was read as "mild gliosis left inferior frontal gyrus, no abnormal parenchymal or leptomeningeal enhancement, small ventricles,  shunt." Due to her symptoms, she was prescribed benadryl cream and pills along with medrol dose pack. She presents today for follow up of symptoms. She reports that her symptoms improved after the medrol dose pack, was unable to find the benadryl cream.

## 2024-06-14 NOTE — PHYSICAL EXAM
[FreeTextEntry1] : Constitutional: NAD Neuro * Mental Status: GCS 15: Awake, alert, oriented to conversation. No aphasia or difficulty speaking. No dysarthria. * Cranial Nerves: Cnii-Cnxii grossly intact. EOMI, tongue midline, no gaze deviation, no facial droop * Motor: b/l UE and LE intact * Sensory: Sensation intact to light touch * Reflexes: not assessed Cardiovascular: Regular rate and rhythm. Eyes: See neurologic examination with detailed examination of eyes. Respiratory: non labored breathing Musculoskeletal: No muscle wasting noted Skin: grossly intact

## 2024-06-14 NOTE — ASSESSMENT
[FreeTextEntry1] : 48F with PMH CSF leak s/p VPS 7/2022 who had recent MRI w/wo which read no changes since 3/2023, and shunt series read as no kinking and appears intact. She presents today for follow up of symptoms of head itchiness and burning sensation throughout her upper extremities, was prescribed benadryl and medrol dose pack. She reports improved symptoms after medrol dose pack.  Plan: - Decadron starting 4mg every 8 hours, tapering to off over 2 weeks - Benadryl cream over the counter as needed - Follow up in 4 weeks  - Patient and family in agreement with plan

## 2024-07-04 ENCOUNTER — NON-APPOINTMENT (OUTPATIENT)
Age: 49
End: 2024-07-04

## 2024-07-10 ENCOUNTER — TRANSCRIPTION ENCOUNTER (OUTPATIENT)
Age: 49
End: 2024-07-10

## 2024-07-11 ENCOUNTER — OUTPATIENT (OUTPATIENT)
Dept: OUTPATIENT SERVICES | Facility: HOSPITAL | Age: 49
LOS: 1 days | End: 2024-07-11
Payer: COMMERCIAL

## 2024-07-11 ENCOUNTER — RESULT REVIEW (OUTPATIENT)
Age: 49
End: 2024-07-11

## 2024-07-11 ENCOUNTER — APPOINTMENT (OUTPATIENT)
Dept: GASTROENTEROLOGY | Facility: HOSPITAL | Age: 49
End: 2024-07-11

## 2024-07-11 DIAGNOSIS — Z12.11 ENCOUNTER FOR SCREENING FOR MALIGNANT NEOPLASM OF COLON: ICD-10-CM

## 2024-07-11 PROCEDURE — 88305 TISSUE EXAM BY PATHOLOGIST: CPT

## 2024-07-11 PROCEDURE — 45380 COLONOSCOPY AND BIOPSY: CPT | Mod: PT

## 2024-07-11 PROCEDURE — 88305 TISSUE EXAM BY PATHOLOGIST: CPT | Mod: 26

## 2024-07-11 PROCEDURE — 45380 COLONOSCOPY AND BIOPSY: CPT | Mod: 33

## 2024-07-11 DEVICE — NAIL OSTEO 1.5X16MM STRL: Type: IMPLANTABLE DEVICE | Status: FUNCTIONAL

## 2024-07-12 LAB — SURGICAL PATHOLOGY STUDY: SIGNIFICANT CHANGE UP

## 2024-08-22 ENCOUNTER — APPOINTMENT (OUTPATIENT)
Dept: NEUROSURGERY | Facility: CLINIC | Age: 49
End: 2024-08-22
Payer: COMMERCIAL

## 2024-08-22 VITALS
TEMPERATURE: 97.3 F | OXYGEN SATURATION: 99 % | WEIGHT: 250 LBS | DIASTOLIC BLOOD PRESSURE: 76 MMHG | HEART RATE: 77 BPM | SYSTOLIC BLOOD PRESSURE: 119 MMHG | BODY MASS INDEX: 35.79 KG/M2 | HEIGHT: 70 IN

## 2024-08-22 DIAGNOSIS — G93.2 BENIGN INTRACRANIAL HYPERTENSION: ICD-10-CM

## 2024-08-22 DIAGNOSIS — Z98.2 PRESENCE OF CEREBROSPINAL FLUID DRAINAGE DEVICE: ICD-10-CM

## 2024-08-22 PROCEDURE — 99214 OFFICE O/P EST MOD 30 MIN: CPT

## 2024-08-22 NOTE — END OF VISIT
[FreeTextEntry3] : Ms. Garcia states that her itchiness is improved however not completely resolved.  She states that it is improved enough to where she does not need another round of treatment.  I informed her that it may continue to improve spontaneously and I will see her in the office in 3 months.  If she continues to have symptoms which are concerning to her in 3 months we can try another round of more potent steroids, such as Decadron.   [Time Spent: ___ minutes] : I have spent [unfilled] minutes of time on the encounter.

## 2024-08-22 NOTE — HISTORY OF PRESENT ILLNESS
[FreeTextEntry1] : SANDY PITTS is status post Right frontal ventriculoperitoneal shunt placement with Strata set at 1.5. Surgery Date: 7/7/22  TO REVIEW CSF leak repair and VPS done on 7/7/22. On 5/28/2022, she had a covid swab done prior to traveling to [place]. Approximately one hour later, she noticed clear drainage from left nare. She did not think much of it and landed in [place] on 5/31. The next day, she developed a fever which continued the following day. Between the continued fever and leakage left nare, she went to MD who suspected a CSF leak. He placed her on Augmentin for one week. 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 CT also demonstrated the leak and an ENT recommended surgery for repair. She decided to fly home to [place] and saw Dr. Adames for evaluation. He recommended she go to ED at [facility] on 6/20. She was placed on antibiotics for 8 days which she spent at hospital. She was then transferred to [facility] 6/28 for further care and went to the OR 6/28 for repair of CSF leak with Dr. Choi, ENT with Lumbar drain. 7/1 ct scan showed very small interhemispheric SDH. 7/5 drain was removed and was transferred back to [facility] on 7/5 under the care of Dr. Hanson due to continued headaches. She underwent placement of Strata valve VPS on 7/7 for intracranial hypertension valve set at 1.5. She was discharged to home on 7/10.  She presented March 2024 with complaints of burning and waves of hot sensation throughout her body along with weakness in bilateral upper extremities and feelings of impatience and agitation. Her CTH was stable. She underwent repeat MRI w/wo which was read as "mild gliosis left inferior frontal gyrus, no abnormal parenchymal or leptomeningeal enhancement, small ventricles,  shunt." Due to her symptoms, she was prescribed benadryl cream and pills along with medrol dose pack x2. She presents today for follow up of symptoms. She reports that the itching and burning have both improved significantly, still remain but much more tolerable. She has no other complaints.

## 2024-08-22 NOTE — HISTORY OF PRESENT ILLNESS
[FreeTextEntry1] : SANDY PITTS is status post Right frontal ventriculoperitoneal shunt placement with Strata set at 1.5. Surgery Date: 7/7/22  TO REVIEW CSF leak repair and VPS done on 7/7/22. On 5/28/2022, she had a covid swab done prior to traveling to [place]. Approximately one hour later, she noticed clear drainage from left nare. She did not think much of it and landed in [place] on 5/31. The next day, she developed a fever which continued the following day. Between the continued fever and leakage left nare, she went to MD who suspected a CSF leak. He placed her on Augmentin for one week. 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 CT also demonstrated the leak and an ENT recommended surgery for repair. She decided to fly home to [place] and saw Dr. Adames for evaluation. He recommended she go to ED at [facility] on 6/20. She was placed on antibiotics for 8 days which she spent at hospital. She was then transferred to [facility] 6/28 for further care and went to the OR 6/28 for repair of CSF leak with Dr. Choi, ENT with Lumbar drain. 7/1 ct scan showed very small interhemispheric SDH. 7/5 drain was removed and was transferred back to [facility] on 7/5 under the care of Dr. Hanson due to continued headaches. She underwent placement of Strata valve VPS on 7/7 for intracranial hypertension valve set at 1.5. She was discharged to home on 7/10.  She presented March 2024 with complaints of burning and waves of hot sensation throughout her body along with weakness in bilateral upper extremities and feelings of impatience and agitation. Her CTH was stable. She underwent repeat MRI w/wo which was read as "mild gliosis left inferior frontal gyrus, no abnormal parenchymal or leptomeningeal enhancement, small ventricles,  shunt." Due to her symptoms, she was prescribed benadryl cream and pills along with medrol dose pack x2. She presents today for follow up of symptoms. She reports that the itching and burning have both improved significantly, still remain but much more tolerable. She has no other complaints.

## 2024-08-22 NOTE — ASSESSMENT
[FreeTextEntry1] : 48F with PMH CSF leak s/p VPS 7/2022 who had recent MRI w/wo which read no changes since 3/2023, and shunt series read as no kinking and appears intact. She presents today for follow up of symptoms of head itchiness and burning sensation throughout her upper extremities, was re-prescribed medrol dose pack and OTC benadryl cream. She presents today for follow up.   Plan: - No further medrol dose packs as patient's symptoms have improved - Follow up in 3 months - Patient and family in agreement with plan.

## 2024-10-19 NOTE — PROGRESS NOTE ADULT - SUBJECTIVE AND OBJECTIVE BOX
oral Patient is a 46y old  Female who presents with a chief complaint of CSF leak (06 Jul 2022 09:07)    HPI:  46yoF PMH HLD, chronic back pain, migraines, transferred from [facility] p/o from repair of CSF leak. Pt initially presented to [facility] on 6/21 w/ L nare nasal drainage since after a COVID swab 5/28. Pt needed swab performed to travel to [place]. While in [place], pt had persistent rhinorrhea since COVID swab, associated fever, HA, cough and generalized weakness. She had an MRI done in [place] 6/9 that showed anterior dural fistula at level of LEFT ethmoid sinus w/ CSF leak. Pt completed 1 week of augmentin. [place] returned [place] the [place] 6/16 and f/u w/ neurology/Dr Adames who sent her for further evaluation. She continued to have fevers up to 102 and came to [facility] ED on 6/20. CTH showed Mildly low-lying cerebellar tonsils. Given Ceftriaxone & Vanco x1. 6/22 ID consulted and started on Cefepime and Vanco. MRI brain +/- & MRV showed  Unremarkable MRI of the brain with and without contrast. Fluid in the left ethmoid and maxillary sinus may be related to CSF leak versus sinusitis. Recommend CT cisternogram for further evaluation. Normal intracranial MR venogram. 6/23 CSF PCR + enterovirus. 6/25 CT maxillofacial Thinning of the cribriform plate with fluid in the left ethmoid and maxillary sinus likely related to CSF leak. 6/28 Transferred to [facility] for surgical management coordinated w/ ENT. 6/29 OR for endoscopic endonasal repair of CSF leak w/ skull base reconstruction and placement of LD. LD @10cc/hr. P/o c/o frontal HA improved w/ pain medication. No further nasal drainage or salty/metallic taste. 7/1 CTH very small interhemispheric SDH stable on subsequent CTH 7/2. 7/5 LD removed and transferred back to [facility] ICU. Currently c/o mild L temporal HA worse w/ lying down.  (05 Jul 2022 15:41)      Interval history:  Patient seen and examined by neurosurgery team. Patient underwent LP today, showed high opening pressure. Reported that headache improved after lumbar tap, has mild headache now controlled with PRN medications. No other acute events reported.       Vital Signs Last 24 Hrs  T(C): 36.6 (06 Jul 2022 23:47), Max: 36.8 (06 Jul 2022 16:01)  T(F): 97.9 (06 Jul 2022 23:47), Max: 98.3 (06 Jul 2022 16:01)  HR: 89 (06 Jul 2022 18:00) (66 - 94)  BP: 131/90 (06 Jul 2022 18:00) (117/77 - 150/83)  BP(mean): 102 (06 Jul 2022 18:00) (87 - 106)  RR: 14 (06 Jul 2022 18:00) (13 - 22)  SpO2: 97% (06 Jul 2022 18:00) (93% - 99%)      Physical Exam:  Constitutional: NAD, lying in bed  Neuro  * Mental Status:  GCS 15: Awake, alert, oriented to conversation. No aphasia or difficulty speaking. No dysarthria.   * Cranial Nerves: Cnii-Cnxii grossly intact. PERRL, EOMI, no gaze deviation  * Motor: RUE 5/5, LUE 5/5, RLE 5/5, LLE 5/5, no drift  * Sensory: Sensation intact to light touch  * Reflexes: not assessed       LABS:                        13.1   11.82 )-----------( 415      ( 06 Jul 2022 04:45 )             40.8     07-06    136  |  103  |  6.3<L>  ----------------------------<  93  3.7   |  18.0<L>  |  0.63    Ca    9.5      06 Jul 2022 04:45  Phos  4.1     07-06  Mg     2.0     07-06      Medications:  MEDICATIONS  (STANDING):  atorvastatin 40 milliGRAM(s) Oral at bedtime  melatonin 5 milliGRAM(s) Oral at bedtime  polyethylene glycol 3350 17 Gram(s) Oral daily  senna 1 Tablet(s) Oral two times a day  sodium chloride 0.65% Nasal 1 Spray(s) Both Nostrils four times a day  sodium chloride 0.9%. 1000 milliLiter(s) (70 mL/Hr) IV Continuous <Continuous>  topiramate 25 milliGRAM(s) Oral daily    MEDICATIONS  (PRN):  acetaminophen     Tablet .. 650 milliGRAM(s) Oral every 4 hours PRN Temp greater or equal to 38C (100.4F), Mild Pain (1 - 3)  cyclobenzaprine 10 milliGRAM(s) Oral three times a day PRN Muscle Spasm  oxyCODONE    IR 5 milliGRAM(s) Oral every 4 hours PRN Moderate Pain (4 - 6)  oxyCODONE    IR 10 milliGRAM(s) Oral every 6 hours PRN Severe Pain (7 - 10)      RADIOLOGY & ADDITIONAL STUDIES:  No new neurosurgery imaging to review.     < from: CT Head No Cont (07.02.22 @ 09:59) >  IMPRESSION:    Similar-appearing small acute subdural hemorrhage in the posterior left   interhemispheric fissure.    --- End of Report ---    SILVANO MILLER MD; Attending Radiologist  This document has been electronically signed. Jul 2 2022 10:11AM    < end of copied text >

## 2024-11-21 ENCOUNTER — APPOINTMENT (OUTPATIENT)
Dept: NEUROSURGERY | Facility: CLINIC | Age: 49
End: 2024-11-21

## 2024-12-02 ENCOUNTER — APPOINTMENT (OUTPATIENT)
Dept: NEUROLOGY | Facility: CLINIC | Age: 49
End: 2024-12-02

## 2024-12-23 NOTE — OCCUPATIONAL THERAPY INITIAL EVALUATION ADULT - LEVEL OF INDEPENDENCE: TOILET, REHAB EVAL
The following medication has been approved and sent to your pharmacy    Requested Prescriptions     Signed Prescriptions Disp Refills    Erenumab-aooe (AIMOVIG) 70 MG/ML SOAJ 1 mL 3     Sig: Inject 70 mg into the skin every 28 days Last dose August 28, 2024;  Next dose September 28, 2024     Authorizing Provider: VIDA EATON        to be assessed

## (undated) DEVICE — GLV 8 PROTEXIS (WHITE)

## (undated) DEVICE — PREP SCRUB IODO DURAPREP 26CC

## (undated) DEVICE — Device

## (undated) DEVICE — DENTURE CUP PINK

## (undated) DEVICE — DRAPE 1/2 SHEET 40X57"

## (undated) DEVICE — PACK LUMBAR LAMI

## (undated) DEVICE — TUBING IRRIGATION NASAL BURR

## (undated) DEVICE — ELCTR PEDICLE SCREW PROBE 3MM BALL 1.8MM X 100MM

## (undated) DEVICE — DRAPE MAYO STAND 30"

## (undated) DEVICE — PACK IV START WITH CHG

## (undated) DEVICE — ELCTR BOVIE TIP NEEDLE INSULATED 2.8" EDGE

## (undated) DEVICE — STAPLER SKIN VISI-STAT 35 WIDE

## (undated) DEVICE — SYR LUER LOK 20CC

## (undated) DEVICE — VENODYNE/SCD SLEEVE CALF LARGE

## (undated) DEVICE — ELCTR MONOPOLAR STIMULATOR PROBE FLUSH-TIP

## (undated) DEVICE — GLV 7 PROTEXIS

## (undated) DEVICE — SUT CHROMIC GUT 4-0 18" P-13

## (undated) DEVICE — GOWN TRIMAX LG

## (undated) DEVICE — DRAPE TOWEL BLUE 17" X 24"

## (undated) DEVICE — SENSOR O2 FINGER ADULT

## (undated) DEVICE — SYR LUER SLIP TIP 50CC

## (undated) DEVICE — DRSG TAPE HYPAFIX 4"

## (undated) DEVICE — KIT DEFENDO 4 OLY 4 PC

## (undated) DEVICE — DRAPE HALF SHEET 40X57"

## (undated) DEVICE — SOL BAG NS 0.9% 1000ML

## (undated) DEVICE — SOLIDIFIER ISOLYZER 2000 CC

## (undated) DEVICE — WARMING BLANKET UPPER ADULT

## (undated) DEVICE — UNDERPAD LINEN SAVER 23 X 36"

## (undated) DEVICE — SOL IRR POUR H2O 250ML

## (undated) DEVICE — CATH IV SAFE BC 22G X 1" (BLUE)

## (undated) DEVICE — FORCEP RADIAL JAW 4 240CM DISP

## (undated) DEVICE — GLV 8 PROTEXIS

## (undated) DEVICE — GLV 7.5 PROTEXIS

## (undated) DEVICE — DRSG TELFA 3 X 8

## (undated) DEVICE — TUBING JET BIPOLAR

## (undated) DEVICE — DRSG CURITY GAUZE SPONGE 4 X 4" 12-PLY NON-STERILE

## (undated) DEVICE — DRAIN LIMITORR DRAIN SYSTEM 30ML

## (undated) DEVICE — GLV 8.5 PROTEXIS (WHITE)

## (undated) DEVICE — SPHERE MARKER (5 SPHERES)

## (undated) DEVICE — SOL IRR POUR NS 0.9% 500ML

## (undated) DEVICE — BUR MEDTRONIC ENT TRANSNASAL SKULL BASE FLUTED 15 DEGREE 3.0MM X 13CM

## (undated) DEVICE — BLADE SURGICAL CLIPPER GENERAL

## (undated) DEVICE — VENODYNE/SCD SLEEVE CALF MEDIUM

## (undated) DEVICE — SYR LUER LOK 10CC

## (undated) DEVICE — INTRO SHEATH INTEGRA PD 61CM

## (undated) DEVICE — DRAPE LIGHT HANDLE COVER BLUE

## (undated) DEVICE — GLV 6.5 PROTEXIS

## (undated) DEVICE — DRAPE MINOR PROCEDURE

## (undated) DEVICE — POSITIONER FOAM EGG CRATE ULNAR (2PCS)

## (undated) DEVICE — MARKER SKIN MULTI TIP 6"

## (undated) DEVICE — ELCTR EDGE BOVIE INSULATED BLADE TIP 2.75"

## (undated) DEVICE — TUBING ALARIS PUMP MODULE NON-DEHP

## (undated) DEVICE — MEDTRONIC TRACKER BUNDLE NI

## (undated) DEVICE — GLV 7.5 PROTEXIS (WHITE)

## (undated) DEVICE — DRSG XEROFORM 1"

## (undated) DEVICE — SUT VICRYL 3-0 18" X-1

## (undated) DEVICE — DRSG STERISTRIPS 0.5 X 4"

## (undated) DEVICE — STORZ DURA MICRO KNIFE INSERT POINTED

## (undated) DEVICE — GLV 6.5 PROTEXIS (WHITE)

## (undated) DEVICE — ENDO SCRUB

## (undated) DEVICE — GLV 8.5 PROTEXIS

## (undated) DEVICE — SPONGE RAYTEC 4X4 16PLY

## (undated) DEVICE — SUT VICRYL 3-0 18" X-1 (POP-OFF)

## (undated) DEVICE — PROBE COAG SUCTION

## (undated) DEVICE — DRSG NASOPORE 8CM FIRM

## (undated) DEVICE — SUT MONOCRYL 4-0 18" PS-2

## (undated) DEVICE — APPLICATOR EXTENDED TIP 8CM

## (undated) DEVICE — GLV 7 PROTEXIS (WHITE)

## (undated) DEVICE — DRAPE 3/4 SHEET W REINFORCEMENT 56X77"

## (undated) DEVICE — CONTAINER SPECIMEN 100ML

## (undated) DEVICE — DRAPE CAMERA VIDEO 7"X96"

## (undated) DEVICE — SOL IRR BAG H2O 1000ML

## (undated) DEVICE — STORZ DURA MICRO KNIFE INSERT SICKLE-SHAPED

## (undated) DEVICE — SUT CHROMIC 3-0 30" V-20

## (undated) DEVICE — DRSG MEROCEL STANDARD NO STRING 8CM X 2CM

## (undated) DEVICE — TUBING SUCTION 20FT

## (undated) DEVICE — PREP CHLORAPREP HI-LITE ORANGE 26ML

## (undated) DEVICE — SUCTION COAGULATOR HAND CONTROL 10FR X 6"

## (undated) DEVICE — ELCTR BIPOLAR PROBE

## (undated) DEVICE — GOWN IMPERV XL

## (undated) DEVICE — BUR PERFORATOR HUDSON END DISP

## (undated) DEVICE — SYR IV FLUSH SALINE 10ML 30/TY

## (undated) DEVICE — SUCTION YANKAUER NO CONTROL VENT

## (undated) DEVICE — WRAP COMPRESSION CALF LG

## (undated) DEVICE — SUT POLYSORB 0 36" GU-46

## (undated) DEVICE — DRSG 2X2

## (undated) DEVICE — FORCEP RADIAL JAW 4 W NDL 2.4MM 2.8MM 240CM ORANGE DISP

## (undated) DEVICE — SUT POLYSORB 2-0 18" V-20

## (undated) DEVICE — TRAP SPECIMEN SPUTUM 40CC

## (undated) DEVICE — BLANKET WARMER LOWER ADULT

## (undated) DEVICE — NDL SPINAL 18G X 3.5" (PINK)

## (undated) DEVICE — TROCAR COVIDIEN BLUNT TIP HASSAN 10MM

## (undated) DEVICE — DRAPE ISOLATION W IOBAN & POUCH

## (undated) DEVICE — ELCTR BOVIE TIP BLADE INSULATED 2.75" EDGE

## (undated) DEVICE — DRSG STERISTRIPS 0.5X4"

## (undated) DEVICE — NDL HYPO REGULAR BEVEL 25G X 1.5" (BLUE)

## (undated) DEVICE — DRSG 4X4

## (undated) DEVICE — CLEANING SHEATH ENDO-SCRUB FOR STORZ 7230CA ARTHROSCOPE 4MM 70 DEGREE

## (undated) DEVICE — MEDTRONIC AXIEM PATIENT TRACKER NON-INVASIVE

## (undated) DEVICE — MARKING PEN W RULER

## (undated) DEVICE — FOLEY TRAY 16FR 5CC LTX UMETER CLOSED

## (undated) DEVICE — POSITIONER FOAM EGG CRATE ULNAR 2PCS (PINK)

## (undated) DEVICE — WARMING BLANKET LOWER ADULT

## (undated) DEVICE — MEDICATION LABELS W MARKER

## (undated) DEVICE — SUT MONOCRYL 4-0 18" P-3

## (undated) DEVICE — POSITIONER FOAM HEADREST (PINK)

## (undated) DEVICE — SPECIMEN CONTAINER 100ML

## (undated) DEVICE — MEDTRONIC AXIEM NAVIGATION POINTER

## (undated) DEVICE — SPONGE LAP X-RAY DETECTABLE 18X18"

## (undated) DEVICE — MASK PROCED EARLOOP 50/BX LRC COVID ADD

## (undated) DEVICE — VISITEC 4X4

## (undated) DEVICE — BUR MEDTRONIC ENT TRANSNASAL SKULL BASE DIAMOND ROUND 15 DEGREE 4.5MM X 13CM

## (undated) DEVICE — PREP BETADINE SPONGE STICKS

## (undated) DEVICE — CLEANING SHEATH ENDO-SCRUB FOR STORZ 7210AA TELESCOPE 4MM 0 DEGREE

## (undated) DEVICE — STRYKER MALLEABLE SUCTION MEDIUM STANDARD

## (undated) DEVICE — DRAPE SPLIT SHEETS 77X108"

## (undated) DEVICE — SUT SILK 2-0 30" TIES

## (undated) DEVICE — DRSG MASTISOL

## (undated) DEVICE — SYR SLIP 10CC

## (undated) DEVICE — SPONGE PATTY X-RAY 0.5X3"

## (undated) DEVICE — SUT VICRYL 2-0 18" CP-2 UNDYED

## (undated) DEVICE — DRSG SPLINT INTRA NASAL .25MM STANDARD THIN

## (undated) DEVICE — FOLEY TRAY 16FR LF URINE METER SURESTEP

## (undated) DEVICE — CLEANING SHEATH ENDO-SCRUB FOR STORZ 7210FA ARTHROSCOPE 4MM 45 DEGREE

## (undated) DEVICE — TUBING IV EXTENSION MACRO W CLAVE 7"

## (undated) DEVICE — SNAP ON SPHERZ 5 PACK

## (undated) DEVICE — TUBING INSUFFLATION LAP FILTER 10FT